# Patient Record
Sex: FEMALE | Race: WHITE | NOT HISPANIC OR LATINO | Employment: OTHER | URBAN - METROPOLITAN AREA
[De-identification: names, ages, dates, MRNs, and addresses within clinical notes are randomized per-mention and may not be internally consistent; named-entity substitution may affect disease eponyms.]

---

## 2017-01-20 ENCOUNTER — GYNECOLOGY VISIT (OUTPATIENT)
Dept: OBGYN | Facility: CLINIC | Age: 32
End: 2017-01-20
Payer: COMMERCIAL

## 2017-01-20 VITALS
SYSTOLIC BLOOD PRESSURE: 118 MMHG | BODY MASS INDEX: 31.55 KG/M2 | DIASTOLIC BLOOD PRESSURE: 72 MMHG | WEIGHT: 213 LBS | HEIGHT: 69 IN

## 2017-01-23 ENCOUNTER — TELEPHONE (OUTPATIENT)
Dept: OBGYN | Facility: CLINIC | Age: 32
End: 2017-01-23

## 2017-01-23 NOTE — TELEPHONE ENCOUNTER
Called pt back   [facility] told [facility] that [facility] - pt talked with them this morning     [facility]ed [facility]; spoke with Lei. Was going to transfer me to the correct dept but then the call was disconnected. Recalled [facility]; spoke with Bea. She verified delivery address; then call was disconnected again. [facility]alled [facility]; spoke with Hayley. Verified delivery address; she informed delivery date is set for 01/27/2017. She verified that it will be delivered on the 27th of Jan. Hayley verified that it will be delivered on 01/27/17.     Called pt - Left message for pt to call back   Informed pt that it should be ehre Friday but htat i would call on Thursday to make sure that the delivery is still on track

## 2017-01-30 NOTE — TELEPHONE ENCOUNTER
Lulú called in regards to another pt; asked Lulú to look into Roxanna's account to see why pt's device was not delivered.   Was transferred to Rajani; informed that its a buy & bill. Going to have them reverify insurance to make sure that device is buy & bill only

## 2017-02-02 NOTE — TELEPHONE ENCOUNTER
[facility]ed [facility] to see if they reverified pt's insurance. Spoke with Kaya; she informed me that the PA was approved (auth #: [id number]) - it will be covered at 100%   Need to schedule - Delivery date scheduled for 02/03/2017

## 2017-02-03 ENCOUNTER — TELEPHONE (OUTPATIENT)
Dept: OBGYN | Facility: CLINIC | Age: 32
End: 2017-02-03

## 2017-02-04 NOTE — TELEPHONE ENCOUNTER
Called the patient and explained that [facility] has been stating to theo that they were shipping the device. Theo stated she would call on Monday and inform the patient of current statu. Pt was grateful for the return call and will be awaiting theo's call.

## 2017-02-06 ENCOUNTER — OFFICE VISIT (OUTPATIENT)
Dept: NEUROLOGY | Facility: MEDICAL CENTER | Age: 32
End: 2017-02-06
Payer: COMMERCIAL

## 2017-02-06 VITALS
BODY MASS INDEX: 32.29 KG/M2 | HEART RATE: 85 BPM | OXYGEN SATURATION: 94 % | WEIGHT: 218 LBS | TEMPERATURE: 99 F | HEIGHT: 69 IN | SYSTOLIC BLOOD PRESSURE: 116 MMHG | RESPIRATION RATE: 16 BRPM | DIASTOLIC BLOOD PRESSURE: 74 MMHG

## 2017-02-06 DIAGNOSIS — H93.8X3 EAR CONGESTION, BILATERAL: ICD-10-CM

## 2017-02-06 DIAGNOSIS — G40.909 SEIZURE DISORDER (HCC): ICD-10-CM

## 2017-02-06 PROCEDURE — 99213 OFFICE O/P EST LOW 20 MIN: CPT | Mod: 25 | Performed by: NURSE PRACTITIONER

## 2017-02-06 PROCEDURE — 64615 CHEMODENERV MUSC MIGRAINE: CPT | Performed by: NURSE PRACTITIONER

## 2017-02-06 RX ORDER — KETOROLAC TROMETHAMINE 30 MG/ML
60 INJECTION, SOLUTION INTRAMUSCULAR; INTRAVENOUS ONCE
Status: COMPLETED | OUTPATIENT
Start: 2017-02-06 | End: 2017-02-06

## 2017-02-06 RX ADMIN — KETOROLAC TROMETHAMINE 60 MG: 30 INJECTION, SOLUTION INTRAMUSCULAR; INTRAVENOUS at 10:41

## 2017-02-06 ASSESSMENT — ENCOUNTER SYMPTOMS
DIZZINESS: 0
NAUSEA: 0
VOMITING: 0
DEPRESSION: 0
NERVOUS/ANXIOUS: 0
SORE THROAT: 0
DOUBLE VISION: 0
ABDOMINAL PAIN: 0
CONSTITUTIONAL NEGATIVE: 1
COUGH: 0
DIARRHEA: 0
HEADACHES: 1
SEIZURES: 0
MUSCULOSKELETAL NEGATIVE: 1

## 2017-02-06 ASSESSMENT — PATIENT HEALTH QUESTIONNAIRE - PHQ9: CLINICAL INTERPRETATION OF PHQ2 SCORE: 2

## 2017-02-06 NOTE — MR AVS SNAPSHOT
"Roxanna Guzmán   2017 10:00 AM   Office Visit   MRN: [id number]    Department:  Neurology [facility]   Dept Phone:  [phone]    Description:  Female : 1985   Provider:  CRIS Jiang           Reason for Visit     Botox Injection Chronic migraine      Allergies as of 2017     No Known Allergies      You were diagnosed with     Chronic migraine   [217251]       Ear congestion, bilateral   [[id number]]         Vital Signs     Blood Pressure Pulse Temperature Respirations Height Weight    116/74 mmHg 85 37.2 °C (99 °F) 16 1.753 m (5' 9.02\") 98.884 kg (218 lb)    Body Mass Index Oxygen Saturation Smoking Status             32.18 kg/m2 94% Never Smoker          Basic Information     Date Of Birth Sex Race Ethnicity Preferred Language    1985 Female White Non- English      Your appointments     May 01, 2017 10:40 AM   BOTOX with CRIS Jiang   [facility] (--)    [address] [place] [address], [address]  [facility] [id number] [phone]              Problem List              ICD-10-CM Priority Class Noted - Resolved    Chronic migraine G43.709   2015 - Present    Seizure disorder (CMS-HCC) G40.909   10/17/2016 - Present      Health Maintenance        Date Due Completion Dates    IMM DTaP/Tdap/Td Vaccine (1 - Tdap) 2003 ---    IMM INFLUENZA (1) 2016 ---    PAP SMEAR 10/17/2019 10/17/2016 (Done), 2015 (Done)    Override on 10/17/2016: Done    Override on 2015: Done (ascus neg hpv)            Current Immunizations     No immunizations on file.      Below and/or attached are the medications your provider expects you to take. Review all of your home medications and newly ordered medications with your provider and/or pharmacist. Follow medication instructions as directed by your provider and/or pharmacist. Please keep your medication list with you and share with your provider. Update the information when medications are discontinued, doses are " changed, or new medications (including over-the-counter products) are added; and carry medication information at all times in the event of emergency situations     Allergies:  No Known Allergies          Medications  Valid as of: February 06, 2017 - 10:43 AM    Generic Name Brand Name Tablet Size Instructions for use    ALPRAZolam (Tab) XANAX 0.5 MG Take 0.5 mg by mouth at bedtime as needed for Sleep.        ARIPiprazole (Tab) ABILIFY 2 MG Take 2 mg by mouth every day.        ClonazePAM (Tab) KLONOPIN 0.5 MG Take 1 Tab by mouth every bedtime.        Escitalopram Oxalate (Tab) LEXAPRO 20 MG Take 20 mg by mouth every day.        Ketorolac Tromethamine (Solution) TORADOL 30 MG/ML 1 mL by Intramuscular route every 6 hours as needed.        Lacosamide (Tab) VIMPAT 200 MG Take 200 mg by mouth 2 Times a Day.        Lacosamide (Tab) VIMPAT 150 MG TAKE 1 TABLET BY MOUTH TWICE A DAY        Norgestim-Eth Estrad Triphasic (Tab) Norgestim-Eth Estrad Triphasic 0.18/0.215/0.25 MG-35 MCG Take 1 Tab by mouth every day.        Rizatriptan Benzoate (TABLET DISPERSIBLE) MAXALT-MLT 10 MG Take 1/2-1 tablet po at onset of headache, may repeat dose in 2 hours if unrelieved.  Do not exceed more than 2 tablets in 24 hours.        Zolpidem Tartrate (Tab) AMBIEN 10 MG Take 10 mg by mouth at bedtime as needed for Sleep.        .                 Medicines prescribed today [facility]     [facility] [id number] [facility] [address]    Phone: [phone] Fax: [phone]    Open 24 Hours?: No      Medication refill instructions:       If your prescription bottle indicates you have medication refills left, it is not necessary to call your provider’s office. Please contact your pharmacy and they will refill your medication.    If your prescription bottle indicates you do not have any refills left, you may request refills at any time through one of the following ways: The online [employer] system  (except Urgent Care), by calling your provider’s office, or by asking your pharmacy to contact your provider’s office with a refill request. Medication refills are processed only during regular business hours and may not be available until the next business day. Your provider may request additional information or to have a follow-up visit with you prior to refilling your medication.   *Please Note: Medication refills are assigned a new Rx number when refilled electronically. Your pharmacy may indicate that no refills were authorized even though a new prescription for the same medication is available at the pharmacy. Please request the medicine by name with the pharmacy before contacting your provider for a refill.        Referral     A referral request has been sent to our patient care coordination department. Please allow 3-5 business days for us to process this request and contact you either by phone or mail. If you do not hear from us by the 5th business day, please call us at [phone].           [employer] Access Code: [email or web address]  Expires: 3/8/2017 10:01 AM    [employer]  A secure, online tool to manage your health information     [employer]® is a secure, online tool that connects you to your personalized health information from the privacy of your home -- day or night - making it very easy for you to manage your healthcare. Once the activation process is completed, you can even access your medical information using the [employer] galen, which is available for free in the Apple Galen store or Google Play store.     [employer] provides the following levels of access (as shown below):   My Chart Features   [facility] Doctor [facility]  Specialists [facility]  Urgent  Care Non-[facility]  Primary Care  Doctor   Email your healthcare team securely and privately 24/7 X X X    Manage appointments: schedule your next appointment; view details of past/upcoming appointments X      Request prescription refills. X       View recent personal medical records, including lab and immunizations X X X X   View health record, including health history, allergies, medications X X X X   Read reports about your outpatient visits, procedures, consult and ER notes X X X X   See your discharge summary, which is a recap of your hospital and/or ER visit that includes your diagnosis, lab results, and care plan. X X       How to register for [employer]:  1. Go to  [email or web address].  2. Click on the Sign Up Now box, which takes you to the New Member Sign Up page. You will need to provide the following information:  a. Enter your [employer] Access Code exactly as it appears at the top of this page. (You will not need to use this code after you’ve completed the sign-up process. If you do not sign up before the expiration date, you must request a new code.)   b. Enter your date of birth.   c. Enter your home email address.   d. Click Submit, and follow the next screen’s instructions.  3. Create a [employer] ID. This will be your [employer] login ID and cannot be changed, so think of one that is secure and easy to remember.  4. Create a [employer] password. You can change your password at any time.  5. Enter your Password Reset Question and Answer. This can be used at a later time if you forget your password.   6. Enter your e-mail address. This allows you to receive e-mail notifications when new information is available in [employer].  7. Click Sign Up. You can now view your health information.    For assistance activating your [employer] account, call [phone]

## 2017-02-06 NOTE — PROGRESS NOTES
Subjective:      Roxanna Guzmán is a 31 y.o. female who presents with Botox Injection.        HPI  Has had a daily headache for the past weeks.  Here for Botox today.    Asking about the need to be seen by the ENT still.  She finds some relief of her ear congestion with Mucinex.    Seizure disorder:  No concerns for seizures with the current dose of Vimpat 150mg BID.      Current Outpatient Prescriptions   Medication Sig Dispense Refill   • VIMPAT 150 MG Tab TAKE 1 TABLET BY MOUTH TWICE A DAY 60 Tab 5   • aripiprazole (ABILIFY) 2 MG tablet Take 2 mg by mouth every day.     • escitalopram (LEXAPRO) 20 MG tablet Take 20 mg by mouth every day.     • Norgestim-Eth Estrad Triphasic (ORTHO TRI-CYCLEN, 28,) 0.18/0.215/0.25 MG-35 MCG Tab Take 1 Tab by mouth every day. 28 Tab 11   • rizatriptan (MAXALT-MLT) 10 MG disintegrating tablet Take 1/2-1 tablet po at onset of headache, may repeat dose in 2 hours if unrelieved.  Do not exceed more than 2 tablets in 24 hours. 12 Tab 5   • alprazolam (XANAX) 0.5 MG Tab Take 0.5 mg by mouth at bedtime as needed for Sleep.     • zolpidem (AMBIEN) 10 MG Tab Take 10 mg by mouth at bedtime as needed for Sleep.     • ketorolac (TORADOL) 30 MG/ML Solution 1 mL by Intramuscular route every 6 hours as needed. 5 mL 1   • lacosamide (VIMPAT) 200 MG Tab tablet Take 200 mg by mouth 2 Times a Day. 60 Tab 2   • clonazepam (KLONOPIN) 0.5 MG TABS Take 1 Tab by mouth every bedtime. (Patient not taking: Reported on 11/14/2016) 60 Tab 1     No current facility-administered medications for this visit.       Review of Systems   Constitutional: Negative.    HENT: Positive for congestion. Negative for ear discharge, hearing loss, nosebleeds and sore throat.         No recent head injury.   Eyes: Negative for double vision.        No new loss of vision.   Respiratory: Negative for cough.         No recent lung infections.   Cardiovascular: Negative for chest pain.   Gastrointestinal: Negative for nausea, vomiting,  "abdominal pain and diarrhea.   Genitourinary: Negative.    Musculoskeletal: Negative.    Skin: Negative.    Neurological: Positive for headaches. Negative for dizziness and seizures.   Endo/Heme/Allergies:        No history of endocrine dysfunction.  No new problems.   Psychiatric/Behavioral: Negative for depression. The patient is not nervous/anxious.         No recent mood changes.          Objective:     /74 mmHg  Pulse 85  Temp(Src) 37.2 °C (99 °F)  Resp 16  Ht 1.753 m (5' 9.02\")  Wt 98.884 kg (218 lb)  BMI 32.18 kg/m2  SpO2 94%     Physical Exam   Constitutional: She is oriented to person, place, and time. She appears well-developed and well-nourished.   HENT:   Head: Normocephalic and atraumatic.   Nose: Nose normal.   Moderately bulging opaque tympanic membranes, cloudy white settled in 1/4 lowest aspect of right membrane.   Eyes: Pupils are equal, round, and reactive to light.   Neck: Normal range of motion.   Cardiovascular: Normal rate and regular rhythm.    Pulmonary/Chest: Effort normal.   Neurological: She is alert and oriented to person, place, and time. She exhibits normal muscle tone. Gait normal.   No observable changes in neurologic status.  See initial new patient examination for details.    Skin: Skin is warm.   Psychiatric: She has a normal mood and affect.          Assessment/Plan:     Chronic Migraine:  Botox therapy has reduced patient’s migraines by more than 7 days and/or 100 hours per month.    Counseled regarding abortive treatment.  Continue Maxalt MLT's-- needs to have a #12 dispensed each month.    I treated this patient in clinic today with BotoxA 155 units according to the dosing/injection paradigm currently mandated by the FDA for the management of chronic migraine. Specifically, I injected 5 units to the procerus, 5 units to the corrugators bilaterally, a total of 20 units to the frontalis musculature, 20 units to the temporalis bilaterally, 15 units to the occipitalis " bilaterally, 10 units to the cervical paraspinals bilaterally and 15 units to the trapezius musculature bilaterally. The remainder of the Botox 200 units mixed but not administered was discarded as wastage per FDA guidelines.    Continue Vimpat 150mg BID.    Toradol 60 mg IM provided per MA.    Will see PCP or ENT regarding ear congestion.    Return for follow-up in 3 months for 4th set of Botox

## 2017-02-06 NOTE — TELEPHONE ENCOUNTER
Kayy - from [facility] called - informed me taht its buy & bill only. Informed her that we got an authorization from pt's insurance. The rep will be calling me back alter today   Called pt with a status update - Left message for pt to call back

## 2017-02-07 NOTE — TELEPHONE ENCOUNTER
NEXPLANON HAS BEEN DELIVERED     Need to schedule pt for insertion  Left message for pt to call back

## 2017-03-10 ENCOUNTER — GYNECOLOGY VISIT (OUTPATIENT)
Dept: OBGYN | Facility: CLINIC | Age: 32
End: 2017-03-10
Payer: COMMERCIAL

## 2017-03-10 VITALS — DIASTOLIC BLOOD PRESSURE: 80 MMHG | SYSTOLIC BLOOD PRESSURE: 120 MMHG

## 2017-03-10 DIAGNOSIS — Z30.017 NEXPLANON INSERTION: ICD-10-CM

## 2017-03-10 DIAGNOSIS — Z30.9 ENCOUNTER FOR CONTRACEPTIVE MANAGEMENT, UNSPECIFIED CONTRACEPTIVE ENCOUNTER TYPE: ICD-10-CM

## 2017-03-10 LAB
INT CON NEG: NEGATIVE
INT CON POS: POSITIVE
POC URINE PREGNANCY TEST: NEGATIVE

## 2017-03-10 PROCEDURE — 81025 URINE PREGNANCY TEST: CPT | Performed by: OBSTETRICS & GYNECOLOGY

## 2017-03-10 PROCEDURE — 11981 INSERTION DRUG DLVR IMPLANT: CPT | Performed by: OBSTETRICS & GYNECOLOGY

## 2017-03-11 NOTE — PROGRESS NOTES
31-year-old female who is here today for insertion of Nexplanon. Patient had this device before and did well. Currently without complaints    Discussed with patient indications for Nexplanon  Also discussed risks associated with placement procedure and device itself  Specifically discussed were infection at insertion site, device becoming dislocated from the insertion site. Also discussed were the use of device, we discussed device is good for 3 years. Discussed possible menstrual cycle irregularities associated with the device which can include amenorrhea, normal menstrual cycles or menorrhagia. Also discussed device failure of approximately 1%.  Previously patient has reviewed 's patient information handout  Informed consent is then signed  Urine pregnancy test is negative  The *left:arm is evaluated. The medial epicondyle is palpated and a marked is placed 8-10 cm from the medial epicondyle. A guiding chinyere is then placed approximately 5 cm from the insertion point. The area is then cleansed with Betadine swabs x3. 1% lidocaine with epinephrine is then placed into the insertion site as well as the insertion tract.  The Nexplanon device is then inspected, and the cap removed.  The device needle was inserted into the skin and the needle is pushed subcutaneously toward the guiding chinyere until the needle we just the hub.  At that point the device insertion trigger is pulled and the device is inserted.  Nexplanon is then palpated under the skin.  Pressure is applied to the area of insertion and Steri-Strips placed. The area is covered with gauze, the gauze is then wrapped with Coban.  The patient is instructed not to remove for 24 hours  Patient is also instructed to followup in one to 2 weeks to check device placement

## 2017-05-01 ENCOUNTER — OFFICE VISIT (OUTPATIENT)
Dept: NEUROLOGY | Facility: MEDICAL CENTER | Age: 32
End: 2017-05-01
Payer: COMMERCIAL

## 2017-05-01 VITALS
SYSTOLIC BLOOD PRESSURE: 114 MMHG | DIASTOLIC BLOOD PRESSURE: 72 MMHG | WEIGHT: 202 LBS | BODY MASS INDEX: 29.92 KG/M2 | TEMPERATURE: 98.2 F | RESPIRATION RATE: 16 BRPM | HEART RATE: 69 BPM | OXYGEN SATURATION: 99 % | HEIGHT: 69 IN

## 2017-05-01 DIAGNOSIS — G40.909 SEIZURE DISORDER (HCC): ICD-10-CM

## 2017-05-01 PROCEDURE — 64615 CHEMODENERV MUSC MIGRAINE: CPT | Performed by: NURSE PRACTITIONER

## 2017-05-01 RX ORDER — KETOROLAC TROMETHAMINE 30 MG/ML
60 INJECTION, SOLUTION INTRAMUSCULAR; INTRAVENOUS ONCE
Status: COMPLETED | OUTPATIENT
Start: 2017-05-01 | End: 2017-05-01

## 2017-05-01 RX ADMIN — KETOROLAC TROMETHAMINE 60 MG: 30 INJECTION, SOLUTION INTRAMUSCULAR; INTRAVENOUS at 11:27

## 2017-05-01 ASSESSMENT — ENCOUNTER SYMPTOMS
NERVOUS/ANXIOUS: 0
NAUSEA: 0
SEIZURES: 0
DOUBLE VISION: 0
DIZZINESS: 0
VOMITING: 0
CONSTITUTIONAL NEGATIVE: 1
ABDOMINAL PAIN: 0
DIARRHEA: 0
DEPRESSION: 0
MUSCULOSKELETAL NEGATIVE: 1
SORE THROAT: 0
HEADACHES: 1
COUGH: 0

## 2017-05-01 NOTE — PROGRESS NOTES
Subjective:      Roxanna Guzmán is a 31 y.o. female who presents with Botox Injection            HPI Here for Botox today.  Still has migraines but they are effectively reduced with abortive treatment.      Current Outpatient Prescriptions   Medication Sig Dispense Refill   • rizatriptan (MAXALT-MLT) 10 MG disintegrating tablet Take 1/2-1 tablet po at onset of headache, may repeat dose in 2 hours if unrelieved.  Do not exceed more than 2 tablets in 24 hours. 12 Tab 5   • aripiprazole (ABILIFY) 2 MG tablet Take 2 mg by mouth every day.     • escitalopram (LEXAPRO) 20 MG tablet Take 20 mg by mouth every day.     • alprazolam (XANAX) 0.5 MG Tab Take 0.5 mg by mouth at bedtime as needed for Sleep.     • zolpidem (AMBIEN) 10 MG Tab Take 10 mg by mouth at bedtime as needed for Sleep.     • lacosamide (VIMPAT) 200 MG Tab tablet Take 200 mg by mouth 2 Times a Day. 60 Tab 2   • VIMPAT 150 MG Tab TAKE 1 TABLET BY MOUTH TWICE A DAY 60 Tab 5   • ketorolac (TORADOL) 30 MG/ML Solution 1 mL by Intramuscular route every 6 hours as needed. 5 mL 1   • Norgestim-Eth Estrad Triphasic (ORTHO TRI-CYCLEN, 28,) 0.18/0.215/0.25 MG-35 MCG Tab Take 1 Tab by mouth every day. 28 Tab 11   • clonazepam (KLONOPIN) 0.5 MG TABS Take 1 Tab by mouth every bedtime. 60 Tab 1     No current facility-administered medications for this visit.       Review of Systems   Constitutional: Negative.    HENT: Negative for hearing loss, nosebleeds and sore throat.         No recent head injury.   Eyes: Negative for double vision.        No new loss of vision.   Respiratory: Negative for cough.         No recent lung infections.   Cardiovascular: Negative for chest pain.   Gastrointestinal: Negative for nausea, vomiting, abdominal pain and diarrhea.   Genitourinary: Negative.    Musculoskeletal: Negative.    Skin: Negative.    Neurological: Positive for headaches. Negative for dizziness and seizures.   Endo/Heme/Allergies:        No history of endocrine dysfunction.  No  "new problems.   Psychiatric/Behavioral: Negative for depression. The patient is not nervous/anxious.         No recent mood changes.          Objective:     /72 mmHg  Pulse 69  Temp(Src) 36.8 °C (98.2 °F)  Resp 16  Ht 1.753 m (5' 9\")  Wt 91.627 kg (202 lb)  BMI 29.82 kg/m2  SpO2 99%     Physical Exam   Constitutional: She is oriented to person, place, and time. She appears well-developed and well-nourished. No distress.   HENT:   Head: Normocephalic and atraumatic.   Nose: Nose normal.   Eyes: EOM are normal.   Neck: Normal range of motion.   Cardiovascular: Normal rate and regular rhythm.    Pulmonary/Chest: Effort normal.   Neurological: She is alert and oriented to person, place, and time. She exhibits normal muscle tone. Gait normal.   No observable changes in neurologic status.  See initial new patient examination for details.    Skin: Skin is warm.   Psychiatric: She has a normal mood and affect.               Assessment/Plan:     Chronic Migraine:  Botox therapy has reduced patient’s migraines by more than 7 days and/or 100 hours per month.    I treated this patient in clinic today with BotoxA 155 units according to the dosing/injection paradigm currently mandated by the FDA for the management of chronic migraine. Specifically, I injected 5 units to the procerus, 5 units to the corrugators bilaterally, a total of 20 units to the frontalis musculature, 20 units to the temporalis bilaterally, 15 units to the occipitalis bilaterally, 10 units to the cervical paraspinals bilaterally and 15 units to the trapezius musculature bilaterally. The remainder of the Botox 200 units mixed but not administered was discarded as wastage per FDA guidelines.    Will continue Vimpat 300mg BID.    Has triptans for abortive treatment.    Return for follow-up in 3 months for serial set of Botox.        "

## 2017-05-01 NOTE — MR AVS SNAPSHOT
"Roxanna Guzmán   2017 10:40 AM   Office Visit   MRN: [id number]    Department:  [facility]   Dept Phone:  [phone]    Description:  Female : 1985   Provider:  CRIS Jiang           Reason for Visit     Botox Injection Migraine      Allergies as of 2017     No Known Allergies      You were diagnosed with     Chronic migraine   [694069]         Vital Signs     Blood Pressure Pulse Temperature Respirations Height Weight    114/72 mmHg 69 36.8 °C (98.2 °F) 16 1.753 m (5' 9\") 91.627 kg (202 lb)    Body Mass Index Oxygen Saturation Smoking Status             29.82 kg/m2 99% Never Smoker          Basic Information     Date Of Birth Sex Race Ethnicity Preferred Language    1985 Female White Non- English      Your appointments     Jul 10, 2017  9:20 AM   BOTOX with CRIS Jiang   [facility] (--)    [address], [address]  [facility] [id number] [phone]              Problem List              ICD-10-CM Priority Class Noted - Resolved    Chronic migraine G43.709   2015 - Present    Seizure disorder (CMS-HCC) G40.909   10/17/2016 - Present      Health Maintenance        Date Due Completion Dates    IMM DTaP/Tdap/Td Vaccine (1 - Tdap) 2003 ---    PAP SMEAR 10/17/2019 10/17/2016 (Done), 2015 (Done)    Override on 10/17/2016: Done    Override on 2015: Done (ascus neg hpv)            Current Immunizations     No immunizations on file.      Below and/or attached are the medications your provider expects you to take. Review all of your home medications and newly ordered medications with your provider and/or pharmacist. Follow medication instructions as directed by your provider and/or pharmacist. Please keep your medication list with you and share with your provider. Update the information when medications are discontinued, doses are changed, or new medications (including over-the-counter products) are added; and carry " medication information at all times in the event of emergency situations     Allergies:  No Known Allergies          Medications  Valid as of: May 01, 2017 - 11:18 AM    Generic Name Brand Name Tablet Size Instructions for use    ALPRAZolam (Tab) XANAX 0.5 MG Take 0.5 mg by mouth at bedtime as needed for Sleep.        ARIPiprazole (Tab) ABILIFY 2 MG Take 2 mg by mouth every day.        ClonazePAM (Tab) KLONOPIN 0.5 MG Take 1 Tab by mouth every bedtime.        Escitalopram Oxalate (Tab) LEXAPRO 20 MG Take 20 mg by mouth every day.        Ketorolac Tromethamine (Solution) TORADOL 30 MG/ML 1 mL by Intramuscular route every 6 hours as needed.        Lacosamide (Tab) VIMPAT 200 MG Take 200 mg by mouth 2 Times a Day.        Lacosamide (Tab) VIMPAT 150 MG TAKE 1 TABLET BY MOUTH TWICE A DAY        Norgestim-Eth Estrad Triphasic (Tab) Norgestim-Eth Estrad Triphasic 0.18/0.215/0.25 MG-35 MCG Take 1 Tab by mouth every day.        Rizatriptan Benzoate (TABLET DISPERSIBLE) MAXALT-MLT 10 MG Take 1/2-1 tablet po at onset of headache, may repeat dose in 2 hours if unrelieved.  Do not exceed more than 2 tablets in 24 hours.        Zolpidem Tartrate (Tab) AMBIEN 10 MG Take 10 mg by mouth at bedtime as needed for Sleep.        .                 Medicines prescribed today were sent to:     [address] [facility]    [address] [facility] [address]    Phone: [phone] Fax: [phone]    Open 24 Hours?: No      Medication refill instructions:       If your prescription bottle indicates you have medication refills left, it is not necessary to call your provider’s office. Please contact your pharmacy and they will refill your medication.    If your prescription bottle indicates you do not have any refills left, you may request refills at any time through one of the following ways: The online [employer] system (except Urgent Care), by calling your provider’s office, or by asking your pharmacy to  contact your provider’s office with a refill request. Medication refills are processed only during regular business hours and may not be available until the next business day. Your provider may request additional information or to have a follow-up visit with you prior to refilling your medication.   *Please Note: Medication refills are assigned a new Rx number when refilled electronically. Your pharmacy may indicate that no refills were authorized even though a new prescription for the same medication is available at the pharmacy. Please request the medicine by name with the pharmacy before contacting your provider for a refill.           [employer] Access Code: [email or web address]  Expires: 5/8/2017 11:25 AM    [employer]  A secure, online tool to manage your health information     [employer]’s [employer]® is a secure, online tool that connects you to your personalized health information from the privacy of your home -- day or night - making it very easy for you to manage your healthcare. Once the activation process is completed, you can even access your medical information using the [employer] galen, which is available for free in the Apple Galen store or Google Play store.     [employer] provides the following levels of access (as shown below):   My Chart Features   [facility] Doctor [facility]  Specialists [facility]  Urgent  Care Non-[facility]  Primary Care  Doctor   Email your healthcare team securely and privately 24/7 X X X    Manage appointments: schedule your next appointment; view details of past/upcoming appointments X      Request prescription refills. X      View recent personal medical records, including lab and immunizations X X X X   View health record, including health history, allergies, medications X X X X   Read reports about your outpatient visits, procedures, consult and ER notes X X X X   See your discharge summary, which is a recap of your hospital and/or ER visit that includes your diagnosis, lab results, and  care plan. X X       How to register for [employer]:  1. Go to  [email or web address].  2. Click on the Sign Up Now box, which takes you to the New Member Sign Up page. You will need to provide the following information:  a. Enter your [employer] Access Code exactly as it appears at the top of this page. (You will not need to use this code after you’ve completed the sign-up process. If you do not sign up before the expiration date, you must request a new code.)   b. Enter your date of birth.   c. Enter your home email address.   d. Click Submit, and follow the next screen’s instructions.  3. Create a [employer] ID. This will be your [employer] login ID and cannot be changed, so think of one that is secure and easy to remember.  4. Create a [employer] password. You can change your password at any time.  5. Enter your Password Reset Question and Answer. This can be used at a later time if you forget your password.   6. Enter your e-mail address. This allows you to receive e-mail notifications when new information is available in [employer].  7. Click Sign Up. You can now view your health information.    For assistance activating your [employer] account, call [phone]

## 2017-05-22 ENCOUNTER — HOSPITAL ENCOUNTER (OUTPATIENT)
Dept: RADIOLOGY | Facility: MEDICAL CENTER | Age: 32
End: 2017-05-22
Attending: ORAL & MAXILLOFACIAL SURGERY
Payer: COMMERCIAL

## 2017-05-22 DIAGNOSIS — M26.621 ARTHRALGIA OF RIGHT TEMPOROMANDIBULAR JOINT: ICD-10-CM

## 2017-05-22 DIAGNOSIS — M26.639 ARTICULAR DISC DISORDER (REDUCING OR NON-REDUCING) OF TEMPOROMANDIBULAR JOINT: ICD-10-CM

## 2017-05-22 DIAGNOSIS — R68.84 JAW PAIN: ICD-10-CM

## 2017-05-22 PROCEDURE — 70336 MAGNETIC IMAGE JAW JOINT: CPT

## 2017-07-24 ENCOUNTER — OFFICE VISIT (OUTPATIENT)
Dept: NEUROLOGY | Facility: MEDICAL CENTER | Age: 32
End: 2017-07-24
Payer: COMMERCIAL

## 2017-07-24 VITALS
WEIGHT: 200.2 LBS | OXYGEN SATURATION: 96 % | RESPIRATION RATE: 16 BRPM | HEART RATE: 72 BPM | SYSTOLIC BLOOD PRESSURE: 116 MMHG | TEMPERATURE: 98.2 F | BODY MASS INDEX: 29.65 KG/M2 | DIASTOLIC BLOOD PRESSURE: 72 MMHG | HEIGHT: 69 IN

## 2017-07-24 DIAGNOSIS — G40.909 SEIZURE DISORDER (HCC): ICD-10-CM

## 2017-07-24 PROCEDURE — 64615 CHEMODENERV MUSC MIGRAINE: CPT | Performed by: NURSE PRACTITIONER

## 2017-07-24 RX ORDER — KETOROLAC TROMETHAMINE 30 MG/ML
60 INJECTION, SOLUTION INTRAMUSCULAR; INTRAVENOUS ONCE
Status: COMPLETED | OUTPATIENT
Start: 2017-07-24 | End: 2017-07-24

## 2017-07-24 RX ADMIN — KETOROLAC TROMETHAMINE 60 MG: 30 INJECTION, SOLUTION INTRAMUSCULAR; INTRAVENOUS at 13:49

## 2017-07-24 ASSESSMENT — PAIN SCALES - GENERAL: PAINLEVEL: 5=MODERATE PAIN

## 2017-07-24 NOTE — PROGRESS NOTES
"Subjective:      Roxanna Guzmán is a 31 y.o. female who presents with Botox Injection          HPI Here for Botox today.    Generally doing well.    Current Outpatient Prescriptions   Medication Sig Dispense Refill   • etonogestrel (NEXPLANON) 68 MG Implant implant Inject 1 Each as instructed Once.     • rizatriptan (MAXALT-MLT) 10 MG disintegrating tablet Take 1/2-1 tablet po at onset of headache, may repeat dose in 2 hours if unrelieved.  Do not exceed more than 2 tablets in 24 hours. 12 Tab 5   • aripiprazole (ABILIFY) 2 MG tablet Take 2 mg by mouth every day.     • escitalopram (LEXAPRO) 20 MG tablet Take 20 mg by mouth every day.     • alprazolam (XANAX) 0.5 MG Tab Take 0.5 mg by mouth at bedtime as needed for Sleep.     • zolpidem (AMBIEN) 10 MG Tab Take 10 mg by mouth at bedtime as needed for Sleep.     • lacosamide (VIMPAT) 200 MG Tab tablet Take 200 mg by mouth 2 Times a Day. 60 Tab 2   • VIMPAT 150 MG Tab TAKE ONE TABLET BY MOUTH TWICE DAILY (Patient not taking: Reported on 7/24/2017) 60 Tab 5   • ketorolac (TORADOL) 30 MG/ML Solution 1 mL by Intramuscular route every 6 hours as needed. (Patient not taking: Reported on 7/24/2017) 5 mL 1   • Norgestim-Eth Estrad Triphasic (ORTHO TRI-CYCLEN, 28,) 0.18/0.215/0.25 MG-35 MCG Tab Take 1 Tab by mouth every day. (Patient not taking: Reported on 7/24/2017) 28 Tab 11   • clonazepam (KLONOPIN) 0.5 MG TABS Take 1 Tab by mouth every bedtime. (Patient not taking: Reported on 7/24/2017) 60 Tab 1     No current facility-administered medications for this visit.       ROS       Objective:     /72 mmHg  Pulse 72  Temp(Src) 36.8 °C (98.2 °F)  Resp 16  Ht 1.753 m (5' 9.02\")  Wt 90.81 kg (200 lb 3.2 oz)  BMI 29.55 kg/m2  SpO2 96%     Physical Exam            Assessment/Plan:     Chronic Migraine:  Botox therapy has reduced patient’s migraines by more than 7 days and/or 100 hours per month.    I treated this patient in clinic today with BotoxA 155 units according to the " dosing/injection paradigm currently mandated by the FDA for the management of chronic migraine. Specifically, I injected 5 units to the procerus, 5 units to the corrugators bilaterally, a total of 20 units to the frontalis musculature, 20 units to the temporalis bilaterally, 15 units to the occipitalis bilaterally, 10 units to the cervical paraspinals bilaterally and 15 units to the trapezius musculature bilaterally. The remainder of the Botox 200 units mixed but not administered was discarded as wastage per FDA guidelines.    Toradol 60mg IM provided per MA.    Return for follow-up in 3 months for serial set of Botox.

## 2017-07-24 NOTE — MR AVS SNAPSHOT
"Roxanna Guzmán   2017 10:40 AM   Office Visit   MRN: [id number]    Department:  [facility]   Dept Phone:  [phone]    Description:  Female : 1985   Provider:  CRIS Jiang           Reason for Visit     Botox Injection Chronic migraine      Allergies as of 2017     No Known Allergies      You were diagnosed with     Chronic migraine   [443735]       Seizure disorder (CMS-HCC)   [691277]         Vital Signs     Blood Pressure Pulse Temperature Respirations Height Weight    116/72 mmHg 72 36.8 °C (98.2 °F) 16 1.753 m (5' 9.02\") 90.81 kg (200 lb 3.2 oz)    Body Mass Index Oxygen Saturation Smoking Status             29.55 kg/m2 96% Never Smoker          Basic Information     Date Of Birth Sex Race Ethnicity Preferred Language    1985 Female White Non- English      Problem List              ICD-10-CM Priority Class Noted - Resolved    Chronic migraine G43.709   2015 - Present    Seizure disorder (CMS-HCC) G40.909   10/17/2016 - Present      Health Maintenance        Date Due Completion Dates    IMM DTaP/Tdap/Td Vaccine (1 - Tdap) 2003 ---    IMM INFLUENZA (1) 2017 ---    PAP SMEAR 10/17/2019 10/17/2016 (Done), 2015 (Done)    Override on 10/17/2016: Done    Override on 2015: Done (ascus neg hpv)            Current Immunizations     No immunizations on file.      Below and/or attached are the medications your provider expects you to take. Review all of your home medications and newly ordered medications with your provider and/or pharmacist. Follow medication instructions as directed by your provider and/or pharmacist. Please keep your medication list with you and share with your provider. Update the information when medications are discontinued, doses are changed, or new medications (including over-the-counter products) are added; and carry medication information at all times in the event of emergency situations     Allergies:  No Known " Allergies          Medications  Valid as of: July 24, 2017 - 11:16 AM    Generic Name Brand Name Tablet Size Instructions for use    ALPRAZolam (Tab) XANAX 0.5 MG Take 0.5 mg by mouth at bedtime as needed for Sleep.        ARIPiprazole (Tab) ABILIFY 2 MG Take 2 mg by mouth every day.        ClonazePAM (Tab) KLONOPIN 0.5 MG Take 1 Tab by mouth every bedtime.        Escitalopram Oxalate (Tab) LEXAPRO 20 MG Take 20 mg by mouth every day.        Etonogestrel (Implant) NEXPLANON 68 MG Inject 1 Each as instructed Once.        Ketorolac Tromethamine (Solution) TORADOL 30 MG/ML 1 mL by Intramuscular route every 6 hours as needed.        Lacosamide (Tab) VIMPAT 200 MG Take 200 mg by mouth 2 Times a Day.        Lacosamide (Tab) VIMPAT 150 MG TAKE ONE TABLET BY MOUTH TWICE DAILY        Norgestim-Eth Estrad Triphasic (Tab) Norgestim-Eth Estrad Triphasic 0.18/0.215/0.25 MG-35 MCG Take 1 Tab by mouth every day.        Rizatriptan Benzoate (TABLET DISPERSIBLE) MAXALT-MLT 10 MG Take 1/2-1 tablet po at onset of headache, may repeat dose in 2 hours if unrelieved.  Do not exceed more than 2 tablets in 24 hours.        Zolpidem Tartrate (Tab) AMBIEN 10 MG Take 10 mg by mouth at bedtime as needed for Sleep.        .                 Medicines prescribed today were sent to:     [address] [facility]    [address] [facility] [place] [place] [address]    Phone: [phone] Fax: [phone]    Open 24 Hours?: No      Medication refill instructions:       If your prescription bottle indicates you have medication refills left, it is not necessary to call your provider’s office. Please contact your pharmacy and they will refill your medication.    If your prescription bottle indicates you do not have any refills left, you may request refills at any time through one of the following ways: The online [employer] system (except Urgent Care), by calling your provider’s office, or by asking your pharmacy to contact your  provider’s office with a refill request. Medication refills are processed only during regular business hours and may not be available until the next business day. Your provider may request additional information or to have a follow-up visit with you prior to refilling your medication.   *Please Note: Medication refills are assigned a new Rx number when refilled electronically. Your pharmacy may indicate that no refills were authorized even though a new prescription for the same medication is available at the pharmacy. Please request the medicine by name with the pharmacy before contacting your provider for a refill.           [employer] Access Code: Activation code not generated  Current [employer] Status: Active

## 2017-10-20 ENCOUNTER — APPOINTMENT (OUTPATIENT)
Dept: NEUROLOGY | Facility: MEDICAL CENTER | Age: 32
End: 2017-10-20
Payer: COMMERCIAL

## 2017-10-23 ENCOUNTER — OFFICE VISIT (OUTPATIENT)
Dept: NEUROLOGY | Facility: MEDICAL CENTER | Age: 32
End: 2017-10-23
Payer: COMMERCIAL

## 2017-10-23 VITALS
DIASTOLIC BLOOD PRESSURE: 76 MMHG | WEIGHT: 198.5 LBS | OXYGEN SATURATION: 97 % | TEMPERATURE: 98.4 F | HEART RATE: 82 BPM | HEIGHT: 69 IN | BODY MASS INDEX: 29.4 KG/M2 | SYSTOLIC BLOOD PRESSURE: 120 MMHG

## 2017-10-23 PROCEDURE — 64615 CHEMODENERV MUSC MIGRAINE: CPT | Performed by: NURSE PRACTITIONER

## 2017-10-23 NOTE — PROGRESS NOTES
"Subjective:      Roxanna Guzmán is a 32 y.o. female who presents with Botox Injection (chronic migraine)            HPI Here for Botox today.    Current Outpatient Prescriptions   Medication Sig Dispense Refill   • etonogestrel (NEXPLANON) 68 MG Implant implant Inject 1 Each as instructed Once.     • VIMPAT 150 MG Tab TAKE ONE TABLET BY MOUTH TWICE DAILY 60 Tab 5   • aripiprazole (ABILIFY) 2 MG tablet Take 2 mg by mouth every day.     • escitalopram (LEXAPRO) 20 MG tablet Take 20 mg by mouth every day.     • alprazolam (XANAX) 0.5 MG Tab Take 0.5 mg by mouth at bedtime as needed for Sleep.     • rizatriptan (MAXALT-MLT) 10 MG disintegrating tablet TAKE 1/2 TO 1 TAB BY MOUTH AT ONSET OF HEADACHE MAY REPEAT DOSE IN 2 HOURS IF UNRELIVED MAX 2 12 Tab 5   • zolpidem (AMBIEN) 10 MG Tab Take 10 mg by mouth at bedtime as needed for Sleep.     • ketorolac (TORADOL) 30 MG/ML Solution 1 mL by Intramuscular route every 6 hours as needed. (Patient not taking: Reported on 7/24/2017) 5 mL 1   • Norgestim-Eth Estrad Triphasic (ORTHO TRI-CYCLEN, 28,) 0.18/0.215/0.25 MG-35 MCG Tab Take 1 Tab by mouth every day. (Patient not taking: Reported on 7/24/2017) 28 Tab 11   • lacosamide (VIMPAT) 200 MG Tab tablet Take 200 mg by mouth 2 Times a Day. (Patient not taking: Reported on 10/23/2017) 60 Tab 2   • clonazepam (KLONOPIN) 0.5 MG TABS Take 1 Tab by mouth every bedtime. (Patient not taking: Reported on 10/23/2017) 60 Tab 1     No current facility-administered medications for this visit.        ROS       Objective:     /76   Pulse 82   Temp 36.9 °C (98.4 °F)   Ht 1.753 m (5' 9.02\")   Wt 90 kg (198 lb 8 oz)   SpO2 97%   BMI 29.30 kg/m²      Physical Exam            Assessment/Plan:     Chronic Migraine:  Botox therapy has reduced patient’s migraines by more than 7 days and/or 100 hours per month.    I treated this patient in clinic today with BotoxA 155 units according to the dosing/injection paradigm currently mandated by the FDA " for the management of chronic migraine. Specifically, I injected 5 units to the procerus, 5 units to the corrugators bilaterally, a total of 20 units to the frontalis musculature, 20 units to the temporalis bilaterally, 15 units to the occipitalis bilaterally, 10 units to the cervical paraspinals bilaterally and 15 units to the trapezius musculature bilaterally. The remainder of the Botox 200 units mixed but not administered was discarded as wastage per FDA guidelines.    Return for follow-up in 3 months for serial set of Botox.

## 2018-01-09 DIAGNOSIS — G43.709 CHRONIC MIGRAINE W/O AURA W/O STATUS MIGRAINOSUS, NOT INTRACTABLE: ICD-10-CM

## 2018-03-26 ENCOUNTER — OFFICE VISIT (OUTPATIENT)
Dept: NEUROLOGY | Facility: MEDICAL CENTER | Age: 33
End: 2018-03-26
Payer: COMMERCIAL

## 2018-03-26 VITALS
HEART RATE: 76 BPM | TEMPERATURE: 98.1 F | HEIGHT: 69 IN | DIASTOLIC BLOOD PRESSURE: 60 MMHG | BODY MASS INDEX: 29.16 KG/M2 | WEIGHT: 196.87 LBS | SYSTOLIC BLOOD PRESSURE: 130 MMHG

## 2018-03-26 DIAGNOSIS — G43.709 CHRONIC MIGRAINE W/O AURA W/O STATUS MIGRAINOSUS, NOT INTRACTABLE: ICD-10-CM

## 2018-03-26 PROCEDURE — 64615 CHEMODENERV MUSC MIGRAINE: CPT | Performed by: PHYSICIAN ASSISTANT

## 2018-03-26 RX ORDER — KETOROLAC TROMETHAMINE 30 MG/ML
30 INJECTION, SOLUTION INTRAMUSCULAR; INTRAVENOUS ONCE
Status: COMPLETED | OUTPATIENT
Start: 2018-03-26 | End: 2018-03-26

## 2018-03-26 RX ADMIN — KETOROLAC TROMETHAMINE 30 MG: 30 INJECTION, SOLUTION INTRAMUSCULAR; INTRAVENOUS at 16:24

## 2018-03-26 NOTE — PROGRESS NOTES
MA administered toradol    I treated this patient in clinic today with BotoxA 195 units according to the dosing/injection paradigm currently mandated by the FDA for the management of chronic migraine. Specifically, I injected 5 units to the procerus, 5 units to the corrugators bilaterally, a total of 20 units to the frontalis musculature, 30 units to the temporalis bilaterally, 15 units to the occipitalis bilaterally, 10 units to the cervical paraspinals bilaterally and 15 units to the trapezius musculature bilaterally.    Pt was advised of side effects associated with medication.    I also injected an additional 10 units into the occipital belly of the occipitofrontalis each side because pt complained of reduced efficacy or effect wearing off prior to next dose.    The remainder of the Botox 200 units mixed but not administered was discarded as wastage per FDA guidelines.    I raised dose to 195 units because even at best her migraine frequency was still at least once per week.  Much better than it had been so botox is working but let's see if we can increase effectiveness with max dose.

## 2018-06-18 ENCOUNTER — OFFICE VISIT (OUTPATIENT)
Dept: NEUROLOGY | Facility: MEDICAL CENTER | Age: 33
End: 2018-06-18
Payer: COMMERCIAL

## 2018-06-18 VITALS
BODY MASS INDEX: 28.07 KG/M2 | TEMPERATURE: 97.9 F | SYSTOLIC BLOOD PRESSURE: 118 MMHG | OXYGEN SATURATION: 98 % | HEART RATE: 81 BPM | HEIGHT: 69 IN | DIASTOLIC BLOOD PRESSURE: 76 MMHG | RESPIRATION RATE: 14 BRPM | WEIGHT: 189.49 LBS

## 2018-06-18 PROCEDURE — 64615 CHEMODENERV MUSC MIGRAINE: CPT | Performed by: PHYSICIAN ASSISTANT

## 2018-06-18 RX ORDER — KETOROLAC TROMETHAMINE 30 MG/ML
60 INJECTION, SOLUTION INTRAMUSCULAR; INTRAVENOUS ONCE
Status: COMPLETED | OUTPATIENT
Start: 2018-06-18 | End: 2018-06-18

## 2018-06-18 RX ADMIN — KETOROLAC TROMETHAMINE 60 MG: 30 INJECTION, SOLUTION INTRAMUSCULAR; INTRAVENOUS at 17:09

## 2018-06-18 ASSESSMENT — PAIN SCALES - GENERAL: PAINLEVEL: NO PAIN

## 2018-06-18 ASSESSMENT — PATIENT HEALTH QUESTIONNAIRE - PHQ9: CLINICAL INTERPRETATION OF PHQ2 SCORE: 0

## 2018-06-19 NOTE — PROGRESS NOTES
MA administered toradol    I treated this patient in clinic today with BotoxA 195 units according to the dosing/injection paradigm currently mandated by the FDA for the management of chronic migraine. Specifically, I injected 5 units to the procerus, 5 units to the corrugators bilaterally, a total of 20 units to the frontalis musculature, 30 units to the temporalis bilaterally, 15 units to the occipitalis bilaterally, 10 units to the cervical paraspinals bilaterally and 15 units to the trapezius musculature bilaterally.    Pt was advised of side effects associated with medication.    I also injected an additional 10 units into the occipital belly of the occipitofrontalis each side because pt complained of reduced efficacy or effect wearing off prior to next dose.    The remainder of the Botox 200 units mixed but not administered was discarded as wastage per FDA guidelines.    This higher dose is being used because pt c/o wearing off prior to administration of next botox when she received a lower dose.

## 2018-07-09 ENCOUNTER — HOSPITAL ENCOUNTER (OUTPATIENT)
Dept: LAB | Facility: MEDICAL CENTER | Age: 33
End: 2018-07-09
Attending: INTERNAL MEDICINE
Payer: COMMERCIAL

## 2018-07-09 PROCEDURE — 88175 CYTOPATH C/V AUTO FLUID REDO: CPT

## 2018-07-09 PROCEDURE — 87624 HPV HI-RISK TYP POOLED RSLT: CPT

## 2018-07-11 LAB
CYTOLOGY REG CYTOL: NORMAL
HPV HR 12 DNA CVX QL NAA+PROBE: NEGATIVE
HPV16 DNA SPEC QL NAA+PROBE: NEGATIVE
HPV18 DNA SPEC QL NAA+PROBE: NEGATIVE
SPECIMEN SOURCE: NORMAL

## 2018-07-14 ENCOUNTER — HOSPITAL ENCOUNTER (OUTPATIENT)
Dept: LAB | Facility: MEDICAL CENTER | Age: 33
End: 2018-07-14
Attending: FAMILY MEDICINE
Payer: COMMERCIAL

## 2018-07-14 LAB
25(OH)D3 SERPL-MCNC: 30 NG/ML (ref 30–100)
ALBUMIN SERPL BCP-MCNC: 4.2 G/DL (ref 3.2–4.9)
ALBUMIN/GLOB SERPL: 1.8 G/DL
ALP SERPL-CCNC: 59 U/L (ref 30–99)
ALT SERPL-CCNC: 26 U/L (ref 2–50)
ANION GAP SERPL CALC-SCNC: 6 MMOL/L (ref 0–11.9)
AST SERPL-CCNC: 15 U/L (ref 12–45)
BASOPHILS # BLD AUTO: 1.5 % (ref 0–1.8)
BASOPHILS # BLD: 0.09 K/UL (ref 0–0.12)
BILIRUB SERPL-MCNC: 0.7 MG/DL (ref 0.1–1.5)
BUN SERPL-MCNC: 15 MG/DL (ref 8–22)
CALCIUM SERPL-MCNC: 9.3 MG/DL (ref 8.5–10.5)
CHLORIDE SERPL-SCNC: 106 MMOL/L (ref 96–112)
CHOLEST SERPL-MCNC: 163 MG/DL (ref 100–199)
CO2 SERPL-SCNC: 29 MMOL/L (ref 20–33)
CREAT SERPL-MCNC: 0.93 MG/DL (ref 0.5–1.4)
EOSINOPHIL # BLD AUTO: 0.23 K/UL (ref 0–0.51)
EOSINOPHIL NFR BLD: 3.8 % (ref 0–6.9)
ERYTHROCYTE [DISTWIDTH] IN BLOOD BY AUTOMATED COUNT: 40 FL (ref 35.9–50)
GLOBULIN SER CALC-MCNC: 2.3 G/DL (ref 1.9–3.5)
GLUCOSE SERPL-MCNC: 103 MG/DL (ref 65–99)
HCT VFR BLD AUTO: 44.2 % (ref 37–47)
HDLC SERPL-MCNC: 42 MG/DL
HGB BLD-MCNC: 15 G/DL (ref 12–16)
IMM GRANULOCYTES # BLD AUTO: 0.01 K/UL (ref 0–0.11)
IMM GRANULOCYTES NFR BLD AUTO: 0.2 % (ref 0–0.9)
LDLC SERPL CALC-MCNC: 95 MG/DL
LYMPHOCYTES # BLD AUTO: 2.35 K/UL (ref 1–4.8)
LYMPHOCYTES NFR BLD: 38.3 % (ref 22–41)
MCH RBC QN AUTO: 29.9 PG (ref 27–33)
MCHC RBC AUTO-ENTMCNC: 33.9 G/DL (ref 33.6–35)
MCV RBC AUTO: 88.2 FL (ref 81.4–97.8)
MONOCYTES # BLD AUTO: 0.43 K/UL (ref 0–0.85)
MONOCYTES NFR BLD AUTO: 7 % (ref 0–13.4)
NEUTROPHILS # BLD AUTO: 3.02 K/UL (ref 2–7.15)
NEUTROPHILS NFR BLD: 49.2 % (ref 44–72)
NRBC # BLD AUTO: 0 K/UL
NRBC BLD-RTO: 0 /100 WBC
PLATELET # BLD AUTO: 312 K/UL (ref 164–446)
PMV BLD AUTO: 9.9 FL (ref 9–12.9)
POTASSIUM SERPL-SCNC: 4 MMOL/L (ref 3.6–5.5)
PROT SERPL-MCNC: 6.5 G/DL (ref 6–8.2)
RBC # BLD AUTO: 5.01 M/UL (ref 4.2–5.4)
SODIUM SERPL-SCNC: 141 MMOL/L (ref 135–145)
T4 FREE SERPL-MCNC: 1 NG/DL (ref 0.53–1.43)
TRIGL SERPL-MCNC: 129 MG/DL (ref 0–149)
TSH SERPL DL<=0.005 MIU/L-ACNC: 1.4 UIU/ML (ref 0.38–5.33)
VIT B12 SERPL-MCNC: 378 PG/ML (ref 211–911)
WBC # BLD AUTO: 6.1 K/UL (ref 4.8–10.8)

## 2018-07-14 PROCEDURE — 36415 COLL VENOUS BLD VENIPUNCTURE: CPT

## 2018-07-14 PROCEDURE — 82607 VITAMIN B-12: CPT

## 2018-07-14 PROCEDURE — 84443 ASSAY THYROID STIM HORMONE: CPT

## 2018-07-14 PROCEDURE — 82172 ASSAY OF APOLIPOPROTEIN: CPT

## 2018-07-14 PROCEDURE — 84439 ASSAY OF FREE THYROXINE: CPT

## 2018-07-14 PROCEDURE — 85025 COMPLETE CBC W/AUTO DIFF WBC: CPT

## 2018-07-14 PROCEDURE — 80061 LIPID PANEL: CPT

## 2018-07-14 PROCEDURE — 82306 VITAMIN D 25 HYDROXY: CPT

## 2018-07-14 PROCEDURE — 80053 COMPREHEN METABOLIC PANEL: CPT

## 2018-07-16 LAB — APO A-I SERPL-MCNC: 130 MG/DL (ref 101–199)

## 2018-07-19 NOTE — TELEPHONE ENCOUNTER
Pt called wants to know what is covered under her insurance for BC. Please call pt back.   Scribe Attestation (For Scribes USE Only)... Attending Attestation (For Attendings USE Only).../Scribe Attestation (For Scribes USE Only)...

## 2018-07-23 ENCOUNTER — HOSPITAL ENCOUNTER (OUTPATIENT)
Dept: LAB | Facility: MEDICAL CENTER | Age: 33
End: 2018-07-23
Attending: OBSTETRICS & GYNECOLOGY
Payer: COMMERCIAL

## 2018-07-23 PROCEDURE — 83520 IMMUNOASSAY QUANT NOS NONAB: CPT

## 2018-07-23 PROCEDURE — 36415 COLL VENOUS BLD VENIPUNCTURE: CPT

## 2018-07-25 LAB — MIS SERPL-MCNC: 4.48 NG/ML (ref 0.18–11.71)

## 2018-09-13 ENCOUNTER — OFFICE VISIT (OUTPATIENT)
Dept: NEUROLOGY | Facility: MEDICAL CENTER | Age: 33
End: 2018-09-13
Payer: COMMERCIAL

## 2018-09-13 VITALS
WEIGHT: 187.2 LBS | OXYGEN SATURATION: 95 % | DIASTOLIC BLOOD PRESSURE: 74 MMHG | SYSTOLIC BLOOD PRESSURE: 116 MMHG | BODY MASS INDEX: 27.73 KG/M2 | TEMPERATURE: 97.5 F | HEIGHT: 69 IN | RESPIRATION RATE: 18 BRPM | HEART RATE: 84 BPM

## 2018-09-13 PROCEDURE — 64615 CHEMODENERV MUSC MIGRAINE: CPT | Performed by: NURSE PRACTITIONER

## 2018-09-13 RX ORDER — KETOROLAC TROMETHAMINE 30 MG/ML
60 INJECTION, SOLUTION INTRAMUSCULAR; INTRAVENOUS ONCE
Status: COMPLETED | OUTPATIENT
Start: 2018-09-13 | End: 2018-09-13

## 2018-09-13 RX ORDER — ZOLMITRIPTAN 5 MG/1
TABLET, ORALLY DISINTEGRATING ORAL
Qty: 9 TAB | Refills: 5 | Status: SHIPPED | OUTPATIENT
Start: 2018-09-13 | End: 2019-09-03

## 2018-09-13 RX ADMIN — KETOROLAC TROMETHAMINE 60 MG: 30 INJECTION, SOLUTION INTRAMUSCULAR; INTRAVENOUS at 08:26

## 2018-09-13 ASSESSMENT — PAIN SCALES - GENERAL: PAINLEVEL: NO PAIN

## 2018-09-13 NOTE — PROGRESS NOTES
Subjective:      Roxanna Guzmán is a 32 y.o. female who presents with Botox Injection (Chronic migraine)            HPI  Here for Botox today.  Experiences a wear off pattern at 10 weeks typically.    Current Outpatient Prescriptions   Medication Sig Dispense Refill   • VIMPAT 150 MG Tab TAKE 1 TABLET BY MOUTH TWICE DAILY FOR 30 DAYS 60 Tab 5   • rizatriptan (MAXALT-MLT) 10 MG disintegrating tablet TAKE 1/2 TO 1 TAB BY MOUTH AT ONSET OF HEADACHE MAY REPEAT DOSE IN 2 HOURS IF UNRELIVED MAX 2 12 Tab 5   • etonogestrel (NEXPLANON) 68 MG Implant implant Inject 1 Each as instructed Once.     • aripiprazole (ABILIFY) 2 MG tablet Take 2 mg by mouth every day.     • escitalopram (LEXAPRO) 20 MG tablet Take 20 mg by mouth every day.     • alprazolam (XANAX) 0.5 MG Tab Take 0.5 mg by mouth at bedtime as needed for Sleep.       Current Facility-Administered Medications   Medication Dose Route Frequency Provider Last Rate Last Dose   • onabotulinum toxin type A SOLR 155 Units  155 Units Injection Once IFRAH JiangPCORAL SAAVEDRA       Objective:     There were no vitals taken for this visit.     Physical Exam            Assessment/Plan:     Chronic Migraine:  Botox therapy has reduced patient’s migraines by more than 7 days and/or 100 hours per month.    I treated this patient in clinic today with BotoxA 155 units according to the dosing/injection paradigm currently mandated by the FDA for the management of chronic migraine. Specifically, I injected 5 units to the procerus, 5 units to the corrugators bilaterally, a total of 20 units to the frontalis musculature, 20 units to the temporalis bilaterally, 15 units to the occipitalis bilaterally, 10 units to the cervical paraspinals bilaterally and 15 units to the trapezius musculature bilaterally. The remainder of the Botox 200 units mixed but not administered was discarded as wastage per FDA guidelines.    Toradol 60mg IM provided per MA.    New Rx for Zomig ZMT provided  as she has been taking Maxalt 20mg at onset of migraines and developing muscle tension of the jaw and neck (side effect of triptan).    Return for follow-up in 3 months for serial set of Botox.

## 2018-12-06 ENCOUNTER — OFFICE VISIT (OUTPATIENT)
Dept: NEUROLOGY | Facility: MEDICAL CENTER | Age: 33
End: 2018-12-06
Payer: COMMERCIAL

## 2018-12-06 VITALS
SYSTOLIC BLOOD PRESSURE: 118 MMHG | OXYGEN SATURATION: 97 % | WEIGHT: 186 LBS | DIASTOLIC BLOOD PRESSURE: 76 MMHG | HEART RATE: 85 BPM | HEIGHT: 69 IN | TEMPERATURE: 97.3 F | BODY MASS INDEX: 27.55 KG/M2

## 2018-12-06 PROBLEM — G43.709 CHRONIC MIGRAINE W/O AURA W/O STATUS MIGRAINOSUS, NOT INTRACTABLE: Status: RESOLVED | Noted: 2018-01-09 | Resolved: 2018-12-06

## 2018-12-06 PROCEDURE — 64615 CHEMODENERV MUSC MIGRAINE: CPT | Performed by: NURSE PRACTITIONER

## 2018-12-06 RX ORDER — FROVATRIPTAN SUCCINATE 2.5 MG/1
TABLET, FILM COATED ORAL
Qty: 69 TAB | Refills: 5 | Status: SHIPPED | OUTPATIENT
Start: 2018-12-06 | End: 2019-02-11 | Stop reason: SDUPTHER

## 2018-12-06 RX ORDER — KETOROLAC TROMETHAMINE 30 MG/ML
60 INJECTION, SOLUTION INTRAMUSCULAR; INTRAVENOUS ONCE
Status: COMPLETED | OUTPATIENT
Start: 2018-12-06 | End: 2018-12-06

## 2018-12-06 RX ADMIN — KETOROLAC TROMETHAMINE 60 MG: 30 INJECTION, SOLUTION INTRAMUSCULAR; INTRAVENOUS at 08:37

## 2018-12-06 NOTE — PROGRESS NOTES
"Subjective:      Roxanna Guzmán is a 33 y.o. female who presents with Botox Injection            HPI  Here for Botox today.  Migraine profile has improved but is still persistent at this time.  She is interested in other options to add in addition to Botox.    Current Outpatient Prescriptions   Medication Sig Dispense Refill   • NON SPECIFIED Ajovy injection 225mg/1.5ml   Use 225mg SQ one time monthly. 1 Each 5   • Frovatriptan Succinate 2.5 MG Tab Take 1/2-1 tablet po at onset of headache, may repeat dose in 2 hours if unrelieved.  Do not exceed more than 2 tablets in 24 hours. 69 Tab 5   • rizatriptan (MAXALT-MLT) 10 MG disintegrating tablet TAKE 1/2 TO 1 TAB BY MOUTH AT ONSET OF HEADACHE MAY REPEAT DOSE IN 2 HRS IF UNRELIVED MAY 2/24HR 12 Tab 11   • zolmitriptan (ZOMIG-ZMT) 5 MG disintegrating tablet Take 1/2-1 tablet po at onset of headache, may repeat dose in 2 hours if unrelieved.  Do not exceed more than 2 tablets in 24 hours. 9 Tab 5   • VIMPAT 150 MG Tab TAKE 1 TABLET BY MOUTH TWICE DAILY FOR 30 DAYS 60 Tab 5   • etonogestrel (NEXPLANON) 68 MG Implant implant Inject 1 Each as instructed Once.     • escitalopram (LEXAPRO) 20 MG tablet Take 20 mg by mouth every day.     • alprazolam (XANAX) 0.5 MG Tab Take 0.5 mg by mouth at bedtime as needed for Sleep.       No current facility-administered medications for this visit.          ROS       Objective:     /76 (BP Location: Left arm, Patient Position: Sitting, BP Cuff Size: Adult)   Pulse 85   Temp 36.3 °C (97.3 °F) (Temporal)   Ht 1.753 m (5' 9\")   Wt 84.4 kg (186 lb)   SpO2 97%   BMI 27.47 kg/m²      Physical Exam            Assessment/Plan:     Chronic Migraine:  Botox therapy has reduced patient’s migraines by more than 7 days and/or 100 hours per month.     I treated this patient in clinic today with BotoxA 155 units according to the dosing/injection paradigm currently mandated by the FDA for the management of chronic migraine. Specifically, I " injected 5 units to the procerus, 5 units to the corrugators bilaterally, a total of 20 units to the frontalis musculature, 20 units to the temporalis bilaterally, 15 units to the occipitalis bilaterally, 10 units to the cervical paraspinals bilaterally and 15 units to the trapezius musculature bilaterally. The remainder of the Botox 200 units mixed but not administered was discarded as wastage per FDA guidelines.     Toradol 60mg IM provided per MA.     New Rx for Ajovy to be initiated.  Ms Guzmán has chronic daily migraines, defined as having 15 or more headaches days per month over a minimum of the last three months. Episodes last more than 4 hours (if untreated). Previous treatments have included beta-blockers such as propranolol, anti-epileptics such as Topamax and klonopin, NSAIDs such as naproxen, anti-depressants such as amitriptyline, and triptans. The patients has not responded to any of these treatments. At this point the only option left would be to use the CGRP modulator in addition to Botox for chronic migraine.     Return for follow-up in 3 months for serial set of Botox.

## 2019-02-08 ENCOUNTER — TELEPHONE (OUTPATIENT)
Dept: NEUROLOGY | Facility: MEDICAL CENTER | Age: 34
End: 2019-02-08

## 2019-02-08 NOTE — TELEPHONE ENCOUNTER
Per Dom sent me a staff message letting me know that this patient has a question regarding her Rx Frovatriptan, I called back and no answer so I LVM letting her know to call me back. I also mentioned that the PA submitted on 12/27/18 was approved just in case if this was her question.

## 2019-02-11 ENCOUNTER — TELEPHONE (OUTPATIENT)
Dept: NEUROLOGY | Facility: MEDICAL CENTER | Age: 34
End: 2019-02-11

## 2019-02-11 DIAGNOSIS — G40.909 SEIZURE DISORDER (HCC): ICD-10-CM

## 2019-02-11 RX ORDER — FROVATRIPTAN SUCCINATE 2.5 MG/1
TABLET, FILM COATED ORAL
Qty: 9 TAB | Refills: 11 | Status: SHIPPED
Start: 2019-02-11 | End: 2019-08-11

## 2019-02-11 NOTE — TELEPHONE ENCOUNTER
Pt called me back and LVM letting me know that even if her INS approved PA her frovatriptan they will only cover 9 pills every 15 days pt is requesting to have script changed, current script is #69 with 5 refills. Tried contacting pt multiple not able to reach and sent to , she also mentioned that she is having multiple breakthrough seizures and is requesting to increase vimpat. I will call pt back this afternoon to get more information.

## 2019-02-13 RX ORDER — LACOSAMIDE 200 MG/1
200 TABLET ORAL 2 TIMES DAILY
Qty: 60 TAB | Refills: 5 | Status: SHIPPED
Start: 2019-02-13 | End: 2019-08-13

## 2019-02-13 NOTE — TELEPHONE ENCOUNTER
I have e-scribed a new Frova prescription and printed plain paper Vimpat dose increase.  Please call and leave a message with this detail.

## 2019-02-13 NOTE — TELEPHONE ENCOUNTER
Left detailed VM to pt about Rx's prescribed, I let her know to call me back if she has further questions.

## 2019-03-05 ENCOUNTER — OFFICE VISIT (OUTPATIENT)
Dept: NEUROLOGY | Facility: MEDICAL CENTER | Age: 34
End: 2019-03-05
Payer: COMMERCIAL

## 2019-03-05 VITALS
OXYGEN SATURATION: 96 % | HEART RATE: 79 BPM | BODY MASS INDEX: 27.7 KG/M2 | SYSTOLIC BLOOD PRESSURE: 114 MMHG | TEMPERATURE: 97.2 F | HEIGHT: 69 IN | WEIGHT: 187 LBS | DIASTOLIC BLOOD PRESSURE: 70 MMHG

## 2019-03-05 PROCEDURE — 64615 CHEMODENERV MUSC MIGRAINE: CPT | Performed by: NURSE PRACTITIONER

## 2019-03-05 RX ORDER — KETOROLAC TROMETHAMINE 30 MG/ML
60 INJECTION, SOLUTION INTRAMUSCULAR; INTRAVENOUS ONCE
Status: COMPLETED | OUTPATIENT
Start: 2019-03-05 | End: 2019-03-05

## 2019-03-05 RX ADMIN — KETOROLAC TROMETHAMINE 60 MG: 30 INJECTION, SOLUTION INTRAMUSCULAR; INTRAVENOUS at 09:11

## 2019-03-05 ASSESSMENT — PATIENT HEALTH QUESTIONNAIRE - PHQ9: CLINICAL INTERPRETATION OF PHQ2 SCORE: 0

## 2019-03-05 NOTE — PROGRESS NOTES
"Subjective:      Roxanna Guzmán is a 33 y.o. female who presents with Procedure (botox)            HPI  Here for Botox today.    Current Outpatient Prescriptions   Medication Sig Dispense Refill   • lacosamide (VIMPAT) 200 MG Tab tablet Take 200 mg by mouth 2 Times a Day for 181 days. 60 Tab 5   • Frovatriptan Succinate 2.5 MG Tab Take 1/2-1 tablet po at onset of headache, may repeat dose in 2 hours if unrelieved.  Do not exceed more than 2 tablets in 24 hours.  15-Day supply. 9 Tab 11   • NON SPECIFIED Ajovy injection 225mg/1.5ml   Use 225mg SQ one time monthly. 1 Each 5   • etonogestrel (NEXPLANON) 68 MG Implant implant Inject 1 Each as instructed Once.     • escitalopram (LEXAPRO) 20 MG tablet Take 20 mg by mouth every day.     • rizatriptan (MAXALT-MLT) 10 MG disintegrating tablet TAKE 1/2 TO 1 TAB BY MOUTH AT ONSET OF HEADACHE MAY REPEAT DOSE IN 2 HRS IF UNRELIVED MAY 2/24HR 12 Tab 11   • zolmitriptan (ZOMIG-ZMT) 5 MG disintegrating tablet Take 1/2-1 tablet po at onset of headache, may repeat dose in 2 hours if unrelieved.  Do not exceed more than 2 tablets in 24 hours. 9 Tab 5   • alprazolam (XANAX) 0.5 MG Tab Take 0.5 mg by mouth at bedtime as needed for Sleep.       Current Facility-Administered Medications   Medication Dose Route Frequency Provider Last Rate Last Dose   • onabotulinum toxin type A SOLR 155 Units  155 Units Injection Once Lynn Gonzalesner, A.P.N.       • ketorolac (TORADOL) injection 60 mg  60 mg Intramuscular Once Lynn HERNÁNDEZ Kumar, A.P.N.           ROS       Objective:     /70   Pulse 79   Temp 36.2 °C (97.2 °F) (Temporal)   Ht 1.753 m (5' 9\")   Wt 84.8 kg (187 lb)   SpO2 96%   BMI 27.62 kg/m²      Physical Exam            Assessment/Plan:     Chronic Migraine:  Botox therapy has reduced patient’s migraines by more than 7 days and/or 100 hours per month.     I treated this patient in clinic today with BotoxA 155 units according to the dosing/injection paradigm currently mandated " by the FDA for the management of chronic migraine. Specifically, I injected 5 units to the procerus, 5 units to the corrugators bilaterally, a total of 20 units to the frontalis musculature, 20 units to the temporalis bilaterally, 15 units to the occipitalis bilaterally, 10 units to the cervical paraspinals bilaterally and 15 units to the trapezius musculature bilaterally. The remainder of the Botox 200 units mixed but not administered was discarded as wastage per FDA guidelines.     Toradol 60mg IM provided per MA.     New Rx for Ajovy to be initiated.  Ms Guzmán has chronic daily migraines, defined as having 15 or more headaches days per month over a minimum of the last three months. Episodes last more than 4 hours (if untreated). Previous treatments have included beta-blockers such as propranolol, anti-epileptics such as Topamax and klonopin, NSAIDs such as naproxen, anti-depressants such as amitriptyline, and triptans. The patients has not responded to any of these treatments. At this point the only option left would be to use the CGRP modulator in addition to Botox for chronic migraine.     Return for follow-up in 3 months for serial set of Botox.

## 2019-05-21 ENCOUNTER — TELEPHONE (OUTPATIENT)
Dept: NEUROLOGY | Facility: MEDICAL CENTER | Age: 34
End: 2019-05-21

## 2019-05-21 NOTE — TELEPHONE ENCOUNTER
"Patient called and is asking for a prescription of the Ketorolac. Her VM is asking for \"30 mg #10\"    She is asking for it to be on plain paper so I can fax it to her office so she has proof of the medication as she is traveling out of the country this Thursday.     Please advise.     "

## 2019-06-11 ENCOUNTER — OFFICE VISIT (OUTPATIENT)
Dept: NEUROLOGY | Facility: MEDICAL CENTER | Age: 34
End: 2019-06-11
Payer: COMMERCIAL

## 2019-06-11 VITALS
SYSTOLIC BLOOD PRESSURE: 118 MMHG | WEIGHT: 180 LBS | HEART RATE: 85 BPM | RESPIRATION RATE: 16 BRPM | HEIGHT: 69 IN | DIASTOLIC BLOOD PRESSURE: 72 MMHG | OXYGEN SATURATION: 98 % | BODY MASS INDEX: 26.66 KG/M2

## 2019-06-11 PROCEDURE — 64615 CHEMODENERV MUSC MIGRAINE: CPT | Performed by: NURSE PRACTITIONER

## 2019-06-11 RX ORDER — RIZATRIPTAN BENZOATE 10 MG/1
TABLET, ORALLY DISINTEGRATING ORAL
Qty: 18 TAB | Refills: 11 | Status: SHIPPED | OUTPATIENT
Start: 2019-06-11 | End: 2020-06-15

## 2019-06-11 RX ORDER — ALPRAZOLAM 1 MG/1
2 TABLET ORAL NIGHTLY PRN
Refills: 2 | COMMUNITY
Start: 2019-06-02 | End: 2021-02-22

## 2019-06-11 RX ORDER — KETOROLAC TROMETHAMINE 30 MG/ML
30 INJECTION, SOLUTION INTRAMUSCULAR; INTRAVENOUS ONCE
Status: COMPLETED | OUTPATIENT
Start: 2019-06-11 | End: 2019-06-11

## 2019-06-11 RX ADMIN — KETOROLAC TROMETHAMINE 30 MG: 30 INJECTION, SOLUTION INTRAMUSCULAR; INTRAVENOUS at 08:16

## 2019-06-11 NOTE — PROGRESS NOTES
"Subjective:      Roxanna Guzmán is a 33 y.o. female who presents with Botox Injection (Chronic migraine)            HPI Here for Botox today.    Needing a refill for Maxalt MLT's.    Current Outpatient Prescriptions   Medication Sig Dispense Refill   • ALPRAZolam (XANAX) 1 MG Tab TAKE 1 TABLET (1 MG) BY MOUTH DAILY AS NEEDED FOR ANXIETY  2   • rizatriptan (MAXALT-MLT) 10 MG disintegrating tablet TAKE 1/2 TO 1 TAB BY MOUTH AT ONSET OF HEADACHE MAY REPEAT DOSE IN 2 HRS IF UNRELIVED MAY 2/24HR.  10-Day supply 18 Tab 11   • lacosamide (VIMPAT) 200 MG Tab tablet Take 200 mg by mouth 2 Times a Day for 181 days. 60 Tab 5   • Frovatriptan Succinate 2.5 MG Tab Take 1/2-1 tablet po at onset of headache, may repeat dose in 2 hours if unrelieved.  Do not exceed more than 2 tablets in 24 hours.  15-Day supply. 9 Tab 11   • NON SPECIFIED Ajovy injection 225mg/1.5ml   Use 225mg SQ one time monthly. 1 Each 5   • etonogestrel (NEXPLANON) 68 MG Implant implant Inject 1 Each as instructed Once.     • escitalopram (LEXAPRO) 20 MG tablet Take 20 mg by mouth every day.     • NON SPECIFIED Ketoralac 30mg tablets  Take one table po Q6 hour prn migraine pain.  Do not exceed more than 2 tablets in 24 hours. 10 Each 0   • zolmitriptan (ZOMIG-ZMT) 5 MG disintegrating tablet Take 1/2-1 tablet po at onset of headache, may repeat dose in 2 hours if unrelieved.  Do not exceed more than 2 tablets in 24 hours. 9 Tab 5     No current facility-administered medications for this visit.          ROS       Objective:     /72 (BP Location: Left arm, Patient Position: Sitting, BP Cuff Size: Adult)   Pulse 85   Resp 16   Ht 1.753 m (5' 9.02\")   Wt 81.6 kg (180 lb)   SpO2 98%   BMI 26.57 kg/m²      Physical Exam            Assessment/Plan:     Chronic Migraine:  Botox therapy has reduced patient’s migraines by more than 7 days and/or 100 hours per month.     I treated this patient in clinic today with BotoxA 155 units according to the dosing/injection " paradigm currently mandated by the FDA for the management of chronic migraine. Specifically, I injected 5 units to the procerus, 5 units to the corrugators bilaterally, a total of 20 units to the frontalis musculature, 20 units to the temporalis bilaterally, 15 units to the occipitalis bilaterally, 10 units to the cervical paraspinals bilaterally and 15 units to the trapezius musculature bilaterally. The remainder of the Botox 200 units mixed but not administered was discarded as wastage per FDA guidelines.     Education/counseling/reduction in medications if pt has medication overuse headache; Frequency of headaches is >15 days monthly with at least 8 migraines monthly; Migraines include at least two of the following: worsened with activity or avoidance of activity with migraines (ie they go lie down), moderate to severe pain intensity, pulsing headache, unilateral headache AND they have either nausea or vomiting OR sensitivity to light and sound.     Although this patient is responding to botox they are NOT migraine free, however, and it is my recommendation Botox be continued at this time.    This patient has chronic daily migraines, defined as having 15 or more headaches days per month, 8 of which are migraines, over a minimum of the last three months. Episodes last more than 4 hours (untreated).  Pt has 2 or more of following (see initial note) -  Headache worsened with activity, pain is moderate to severe intensity, pulsing in nature, unilateral and patient either has nausea/vomiting OR sensitivity to light and sound.  This patient does/does not also have medication overuse headache.  However our plan to address this includes education, occipital nerve shots, restricting use as directed.    Toradol 30mg IM provided per MA.    Return for follow-up in 3 months for serial set of Botox and evaluation of need for Botox.

## 2019-08-10 ENCOUNTER — OFFICE VISIT (OUTPATIENT)
Dept: URGENT CARE | Facility: CLINIC | Age: 34
End: 2019-08-10
Payer: COMMERCIAL

## 2019-08-10 VITALS
HEART RATE: 84 BPM | HEIGHT: 69 IN | BODY MASS INDEX: 26.45 KG/M2 | DIASTOLIC BLOOD PRESSURE: 78 MMHG | WEIGHT: 178.6 LBS | SYSTOLIC BLOOD PRESSURE: 118 MMHG | TEMPERATURE: 98.7 F | OXYGEN SATURATION: 100 % | RESPIRATION RATE: 20 BRPM

## 2019-08-10 DIAGNOSIS — J01.40 ACUTE PANSINUSITIS, RECURRENCE NOT SPECIFIED: ICD-10-CM

## 2019-08-10 DIAGNOSIS — B37.9 ANTIBIOTIC-INDUCED YEAST INFECTION: ICD-10-CM

## 2019-08-10 DIAGNOSIS — H69.93 DYSFUNCTION OF BOTH EUSTACHIAN TUBES: ICD-10-CM

## 2019-08-10 DIAGNOSIS — T36.95XA ANTIBIOTIC-INDUCED YEAST INFECTION: ICD-10-CM

## 2019-08-10 DIAGNOSIS — G43.809 OTHER MIGRAINE WITHOUT STATUS MIGRAINOSUS, NOT INTRACTABLE: ICD-10-CM

## 2019-08-10 PROCEDURE — 99203 OFFICE O/P NEW LOW 30 MIN: CPT | Performed by: PHYSICIAN ASSISTANT

## 2019-08-10 RX ORDER — AMOXICILLIN AND CLAVULANATE POTASSIUM 875; 125 MG/1; MG/1
1 TABLET, FILM COATED ORAL 2 TIMES DAILY
Qty: 14 TAB | Refills: 0 | Status: SHIPPED | OUTPATIENT
Start: 2019-08-10 | End: 2019-08-17

## 2019-08-10 RX ORDER — KETOROLAC TROMETHAMINE 30 MG/ML
15 INJECTION, SOLUTION INTRAMUSCULAR; INTRAVENOUS EVERY 6 HOURS PRN
Status: ON HOLD | COMMUNITY
End: 2021-02-17

## 2019-08-10 RX ORDER — OMEGA-3 FATTY ACIDS 500 MG
CAPSULE ORAL
COMMUNITY
End: 2019-09-03

## 2019-08-10 RX ORDER — METHYLPREDNISOLONE 4 MG/1
TABLET ORAL
Qty: 1 KIT | Refills: 0 | Status: SHIPPED | OUTPATIENT
Start: 2019-08-10 | End: 2019-08-17

## 2019-08-10 RX ORDER — FLUCONAZOLE 150 MG/1
150 TABLET ORAL DAILY
Qty: 1 TAB | Refills: 0 | Status: SHIPPED | OUTPATIENT
Start: 2019-08-10 | End: 2019-08-11

## 2019-08-10 RX ORDER — BUSPIRONE HYDROCHLORIDE 10 MG/1
10 TABLET ORAL 2 TIMES DAILY
COMMUNITY
End: 2019-11-26

## 2019-08-11 ASSESSMENT — ENCOUNTER SYMPTOMS
CHILLS: 0
MIGRAINE HEADACHES: 1
BRUISES/BLEEDS EASILY: 0
FEVER: 0
HEADACHES: 1
PALPITATIONS: 0
PHOTOPHOBIA: 1
SORE THROAT: 0
MYALGIAS: 0
SINUS PRESSURE: 1
DIZZINESS: 0
COUGH: 0
FOCAL WEAKNESS: 0
BLURRED VISION: 0
SHORTNESS OF BREATH: 0
NAUSEA: 0
EYE REDNESS: 0
SINUS PAIN: 1
LOSS OF CONSCIOUSNESS: 0
ABNORMAL BEHAVIOR: 0
VOMITING: 0

## 2019-08-17 ENCOUNTER — OFFICE VISIT (OUTPATIENT)
Dept: URGENT CARE | Facility: CLINIC | Age: 34
End: 2019-08-17
Payer: COMMERCIAL

## 2019-08-17 VITALS
SYSTOLIC BLOOD PRESSURE: 106 MMHG | TEMPERATURE: 98.8 F | WEIGHT: 175 LBS | HEART RATE: 104 BPM | HEIGHT: 69 IN | BODY MASS INDEX: 25.92 KG/M2 | RESPIRATION RATE: 12 BRPM | OXYGEN SATURATION: 97 % | DIASTOLIC BLOOD PRESSURE: 68 MMHG

## 2019-08-17 DIAGNOSIS — H69.93 EUSTACHIAN TUBE DYSFUNCTION, BILATERAL: ICD-10-CM

## 2019-08-17 PROCEDURE — 99214 OFFICE O/P EST MOD 30 MIN: CPT | Performed by: NURSE PRACTITIONER

## 2019-08-17 RX ORDER — METHYLPREDNISOLONE 4 MG/1
4 TABLET ORAL DAILY
Qty: 1 KIT | Refills: 0 | Status: SHIPPED | OUTPATIENT
Start: 2019-08-17 | End: 2019-09-03

## 2019-08-17 RX ORDER — FLUTICASONE PROPIONATE 50 MCG
1 SPRAY, SUSPENSION (ML) NASAL DAILY
Qty: 16 G | Refills: 0 | Status: ON HOLD | OUTPATIENT
Start: 2019-08-17 | End: 2021-10-08

## 2019-08-17 RX ORDER — CEFDINIR 300 MG/1
300 CAPSULE ORAL 2 TIMES DAILY
Qty: 14 CAP | Refills: 0 | Status: SHIPPED | OUTPATIENT
Start: 2019-08-17 | End: 2019-08-24

## 2019-08-17 ASSESSMENT — PAIN SCALES - GENERAL: PAINLEVEL: 7=MODERATE-SEVERE PAIN

## 2019-08-18 ASSESSMENT — ENCOUNTER SYMPTOMS
EYE DISCHARGE: 0
EYE REDNESS: 0
SHORTNESS OF BREATH: 0
CHILLS: 0
FEVER: 0
STRIDOR: 0
COUGH: 0
WHEEZING: 0
SORE THROAT: 0
DIZZINESS: 0
SINUS PAIN: 1
EYE PAIN: 0

## 2019-08-18 NOTE — PROGRESS NOTES
Subjective:     Roxanna Guzmán is a 33 y.o. female who presents for Sinus Problem (x2 weeks, improved after anitibiotic/steroid treatment, symptoms have returned, worse) and Otalgia (bilateral )      Ear pain for a couple of weeks,  3-4 weeks. Finished Medrol dose pack and Augmentin with minimal relief. Sinus, not congested. Has maxillary sinus pressure. No fevers. No ear drainage. No cough.  Had ENT follow up last year for the same. Right ear tube placement, and removed.    Occasional seasonal allergies. Flonase has not helped in the past.   Otalgia    There is pain in both ears. This is a recurrent problem. The current episode started more than 1 month ago. The problem occurs constantly. The problem has been waxing and waning. There has been no fever. Pertinent negatives include no coughing, ear discharge, hearing loss, rash or sore throat.       Past Medical History:   Diagnosis Date   • Depression    • Head ache    • Migraine    • Seizure disorder, complex partial (HCC)    • TMJ arthropathy        Past Surgical History:   Procedure Laterality Date   • HERNIA REPAIR         Social History     Socioeconomic History   • Marital status:      Spouse name: Not on file   • Number of children: Not on file   • Years of education: Not on file   • Highest education level: Not on file   Occupational History   • Not on file   Social Needs   • Financial resource strain: Not on file   • Food insecurity:     Worry: Not on file     Inability: Not on file   • Transportation needs:     Medical: Not on file     Non-medical: Not on file   Tobacco Use   • Smoking status: Never Smoker   • Smokeless tobacco: Never Used   Substance and Sexual Activity   • Alcohol use: Yes     Comment: soc   • Drug use: No   • Sexual activity: Never     Birth control/protection: Patch   Lifestyle   • Physical activity:     Days per week: Not on file     Minutes per session: Not on file   • Stress: Not on file   Relationships   • Social connections:  "    Talks on phone: Not on file     Gets together: Not on file     Attends [employer] service: Not on file     Active member of club or organization: Not on file     Attends meetings of clubs or organizations: Not on file     Relationship status: Not on file   • Intimate partner violence:     Fear of current or ex partner: Not on file     Emotionally abused: Not on file     Physically abused: Not on file     Forced sexual activity: Not on file   Other Topics Concern   • Not on file   Social History Narrative   • Not on file        Family History   Problem Relation Age of Onset   • Hypertension Mother    • Cancer Father         prostate cancer   • Depression Father    • Sexual abuse Maternal Grandmother         No Known Allergies    Review of Systems   Constitutional: Negative for chills and fever.   HENT: Positive for ear pain and sinus pain. Negative for ear discharge, hearing loss, nosebleeds and sore throat.    Eyes: Negative for pain, discharge and redness.   Respiratory: Negative for cough, shortness of breath, wheezing and stridor.    Skin: Negative for rash.   Neurological: Negative for dizziness.   Endo/Heme/Allergies: Positive for environmental allergies.   All other systems reviewed and are negative.       Objective:   /68   Pulse (!) 104   Temp 37.1 °C (98.8 °F) (Temporal)   Resp 12   Ht 1.753 m (5' 9\")   Wt 79.4 kg (175 lb)   SpO2 97%   Breastfeeding? No Comment: no cycle w/Nexplanon  BMI 25.84 kg/m²     Physical Exam   Constitutional: She is oriented to person, place, and time. She appears well-developed. No distress.   HENT:   Head: Normocephalic and atraumatic.   Right Ear: Hearing, external ear and ear canal normal. No lacerations. There is tenderness. No drainage or swelling. No foreign bodies. No mastoid tenderness. Tympanic membrane is bulging. Tympanic membrane is not injected, not perforated and not erythematous. A middle ear effusion is present. No hemotympanum.   Left Ear: " Hearing, external ear and ear canal normal. No lacerations. There is tenderness. No drainage or swelling. No foreign bodies. No mastoid tenderness. Tympanic membrane is bulging. Tympanic membrane is not injected, not perforated and not erythematous. A middle ear effusion is present. No hemotympanum.   Nose: Nose normal. Right sinus exhibits no maxillary sinus tenderness and no frontal sinus tenderness. Left sinus exhibits no maxillary sinus tenderness and no frontal sinus tenderness.   Mouth/Throat: Uvula is midline, oropharynx is clear and moist and mucous membranes are normal. No oropharyngeal exudate.   Clear bilateral effusion.    Eyes: Pupils are equal, round, and reactive to light. Conjunctivae are normal.   Neck: Normal range of motion. Neck supple.   Cardiovascular: Normal rate.   Pulmonary/Chest: Effort normal and breath sounds normal. No respiratory distress.   Musculoskeletal: Normal range of motion.   Lymphadenopathy:     She has no cervical adenopathy.   Neurological: She is alert and oriented to person, place, and time. She has normal strength. No sensory deficit. GCS eye subscore is 4. GCS verbal subscore is 5. GCS motor subscore is 6.   Skin: Skin is warm and dry.   Psychiatric: She has a normal mood and affect. Her speech is normal and behavior is normal. Judgment and thought content normal.   Vitals reviewed.      Assessment/Plan:     1. Eustachian tube dysfunction, bilateral  - REFERRAL TO ENT  - methylPREDNISolone (MEDROL DOSEPAK) 4 MG Tablet Therapy Pack; Take 1 Tab by mouth every day.  Dispense: 1 Kit; Refill: 0  - fluticasone (FLONASE) 50 MCG/ACT nasal spray; Spray 1 Spray in nose every day.  Dispense: 16 g; Refill: 0  -OTC loratadine as directed.    Contingent- cefdinir (OMNICEF) 300 MG Cap; Take 1 Cap by mouth 2 times a day for 7 days.  Dispense: 14 Cap; Refill: 0    Follow up for ear drainage, fever, increased pain or pressure, facial swelling, ciarra changes, shortness of breath, or any  other concerns.     Differential diagnosis, natural history, supportive care, and indications for immediate follow-up discussed.

## 2019-09-03 ENCOUNTER — OFFICE VISIT (OUTPATIENT)
Dept: NEUROLOGY | Facility: MEDICAL CENTER | Age: 34
End: 2019-09-03
Payer: COMMERCIAL

## 2019-09-03 VITALS
HEART RATE: 75 BPM | RESPIRATION RATE: 16 BRPM | TEMPERATURE: 98.4 F | WEIGHT: 175 LBS | DIASTOLIC BLOOD PRESSURE: 70 MMHG | HEIGHT: 69 IN | SYSTOLIC BLOOD PRESSURE: 122 MMHG | OXYGEN SATURATION: 98 % | BODY MASS INDEX: 25.92 KG/M2

## 2019-09-03 PROCEDURE — 64615 CHEMODENERV MUSC MIGRAINE: CPT | Performed by: NURSE PRACTITIONER

## 2019-09-03 RX ORDER — KETOROLAC TROMETHAMINE 30 MG/ML
60 INJECTION, SOLUTION INTRAMUSCULAR; INTRAVENOUS ONCE
Status: COMPLETED | OUTPATIENT
Start: 2019-09-03 | End: 2019-09-03

## 2019-09-03 RX ADMIN — KETOROLAC TROMETHAMINE 60 MG: 30 INJECTION, SOLUTION INTRAMUSCULAR; INTRAVENOUS at 09:15

## 2019-09-03 NOTE — PROGRESS NOTES
Subjective:      Roxanna Guzmán is a 33 y.o. female who presents with Botox Injection (Chronic migraine)            HPI  Here for Botox today.     She notices great relief of tension in the neck and shoulders when Botox is active.    Still has X2 Ajovy doses in her fridge.    Seeing ENT for inner ear congestion the middle of September.    Current Outpatient Medications   Medication Sig Dispense Refill   • VIMPAT 200 MG Tab tablet TAKE 1 TABLET BY MOUTH TWICE A DAY 60 Tab 5   • fluticasone (FLONASE) 50 MCG/ACT nasal spray Spray 1 Spray in nose every day. 16 g 0   • ketorolac (TORADOL) 30 MG/ML Solution 15 mg by Intravenous route every 6 hours as needed.     • Fe Bisgly-Fe Polysac-B12-Zinc (MAXFE PO) Take 10 mg by mouth two times a day may change times on alt/days.     • Pseudoephedrine-guaiFENesin (MUCINEX D PO) Take  by mouth.     • Pseudoephedrine HCl (SUDAFED PO) Take  by mouth.     • busPIRone (BUSPAR) 10 MG Tab tablet Take 10 mg by mouth 2 times a day.     • ALPRAZolam (XANAX) 1 MG Tab TAKE 1 TABLET (1 MG) BY MOUTH DAILY AS NEEDED FOR ANXIETY  2   • rizatriptan (MAXALT-MLT) 10 MG disintegrating tablet TAKE 1/2 TO 1 TAB BY MOUTH AT ONSET OF HEADACHE MAY REPEAT DOSE IN 2 HRS IF UNRELIVED MAY 2/24HR.  10-Day supply 18 Tab 11   • NON SPECIFIED Ketoralac 30mg tablets  Take one table po Q6 hour prn migraine pain.  Do not exceed more than 2 tablets in 24 hours. 10 Each 0   • NON SPECIFIED Ajovy injection 225mg/1.5ml   Use 225mg SQ one time monthly. 1 Each 5   • etonogestrel (NEXPLANON) 68 MG Implant implant Inject 1 Each as instructed Once.     • escitalopram (LEXAPRO) 20 MG tablet Take 20 mg by mouth every day.     • methylPREDNISolone (MEDROL DOSEPAK) 4 MG Tablet Therapy Pack Take 1 Tab by mouth every day. 1 Kit 0   • Omega-3 Fatty Acids (MAXEPA) 1000 MG Cap Take  by mouth.     • zolmitriptan (ZOMIG-ZMT) 5 MG disintegrating tablet Take 1/2-1 tablet po at onset of headache, may repeat dose in 2 hours if unrelieved.  Do  "not exceed more than 2 tablets in 24 hours. (Patient not taking: Reported on 8/10/2019) 9 Tab 5     Current Facility-Administered Medications   Medication Dose Route Frequency Provider Last Rate Last Dose   • onabotulinum toxin type A SOLR 155 Units  155 Units Injection Once CRIS Jiang       Objective:     /70 (BP Location: Right arm, Patient Position: Sitting, BP Cuff Size: Adult)   Pulse 75   Temp 36.9 °C (98.4 °F) (Temporal)   Resp 16   Ht 1.753 m (5' 9.02\")   Wt 79.4 kg (175 lb)   SpO2 98%   BMI 25.83 kg/m²      Physical Exam            Assessment/Plan:     Chronic Migraine:  Botox therapy has reduced patient’s migraines by more than 7 days and/or 100 hours per month.     I treated this patient in clinic today with BotoxA 155 units according to the dosing/injection paradigm currently mandated by the FDA for the management of chronic migraine. Specifically, I injected 5 units to the procerus, 5 units to the corrugators bilaterally, a total of 20 units to the frontalis musculature, 20 units to the temporalis bilaterally, 15 units to the occipitalis bilaterally, 10 units to the cervical paraspinals bilaterally and 15 units to the trapezius musculature bilaterally. The remainder of the Botox 200 units mixed but not administered was discarded as wastage per FDA guidelines.     Education/counseling/reduction in medications if pt has medication overuse headache; Frequency of headaches is >15 days monthly with at least 8 migraines monthly; Migraines include at least two of the following: worsened with activity or avoidance of activity with migraines (ie they go lie down), moderate to severe pain intensity, pulsing headache, unilateral headache AND they have either nausea or vomiting OR sensitivity to light and sound.     Although this patient is responding to botox they are NOT migraine free, however, and it is my recommendation Botox be continued at this time.     This " patient has chronic daily migraines, defined as having 15 or more headaches days per month, 8 of which are migraines, over a minimum of the last three months. Episodes last more than 4 hours (untreated).  Pt has 2 or more of following (see initial note) -  Headache worsened with activity, pain is moderate to severe intensity, pulsing in nature, unilateral and patient either has nausea/vomiting OR sensitivity to light and sound.  This patient does/does not also have medication overuse headache.  However our plan to address this includes education, occipital nerve shots, restricting use as directed.    Toradol 60mg IM provided per MA.     Return for follow-up in 3 months for serial set of Botox and evaluation of need for Botox.

## 2019-09-08 ENCOUNTER — HOSPITAL ENCOUNTER (OUTPATIENT)
Facility: MEDICAL CENTER | Age: 34
End: 2019-09-08
Attending: NURSE PRACTITIONER
Payer: COMMERCIAL

## 2019-09-08 ENCOUNTER — OFFICE VISIT (OUTPATIENT)
Dept: URGENT CARE | Facility: CLINIC | Age: 34
End: 2019-09-08
Payer: COMMERCIAL

## 2019-09-08 VITALS
DIASTOLIC BLOOD PRESSURE: 68 MMHG | SYSTOLIC BLOOD PRESSURE: 110 MMHG | HEART RATE: 72 BPM | BODY MASS INDEX: 25.83 KG/M2 | RESPIRATION RATE: 18 BRPM | OXYGEN SATURATION: 98 % | WEIGHT: 175 LBS | TEMPERATURE: 98.2 F

## 2019-09-08 DIAGNOSIS — N30.00 ACUTE CYSTITIS WITHOUT HEMATURIA: ICD-10-CM

## 2019-09-08 DIAGNOSIS — R30.0 DYSURIA: ICD-10-CM

## 2019-09-08 LAB
APPEARANCE UR: CLEAR
BILIRUB UR STRIP-MCNC: NORMAL MG/DL
COLOR UR AUTO: YELLOW
GLUCOSE UR STRIP.AUTO-MCNC: NORMAL MG/DL
KETONES UR STRIP.AUTO-MCNC: NORMAL MG/DL
LEUKOCYTE ESTERASE UR QL STRIP.AUTO: NORMAL
NITRITE UR QL STRIP.AUTO: NORMAL
PH UR STRIP.AUTO: 7 [PH] (ref 5–8)
PROT UR QL STRIP: NORMAL MG/DL
RBC UR QL AUTO: NORMAL
SP GR UR STRIP.AUTO: 1.02
UROBILINOGEN UR STRIP-MCNC: 0.2 MG/DL

## 2019-09-08 PROCEDURE — 99214 OFFICE O/P EST MOD 30 MIN: CPT | Performed by: NURSE PRACTITIONER

## 2019-09-08 PROCEDURE — 81002 URINALYSIS NONAUTO W/O SCOPE: CPT | Performed by: NURSE PRACTITIONER

## 2019-09-08 PROCEDURE — 87077 CULTURE AEROBIC IDENTIFY: CPT

## 2019-09-08 PROCEDURE — 87186 SC STD MICRODIL/AGAR DIL: CPT

## 2019-09-08 PROCEDURE — 87086 URINE CULTURE/COLONY COUNT: CPT

## 2019-09-08 PROCEDURE — 99000 SPECIMEN HANDLING OFFICE-LAB: CPT | Performed by: NURSE PRACTITIONER

## 2019-09-08 RX ORDER — NITROFURANTOIN 25; 75 MG/1; MG/1
100 CAPSULE ORAL 2 TIMES DAILY
Qty: 10 CAP | Refills: 0 | Status: SHIPPED | OUTPATIENT
Start: 2019-09-08 | End: 2019-09-13

## 2019-09-08 ASSESSMENT — ENCOUNTER SYMPTOMS
FEVER: 0
FLANK PAIN: 0
CHILLS: 0

## 2019-09-08 NOTE — PROGRESS NOTES
Subjective:      Roxanna Guzmán is a 33 y.o. female who presents with Dysuria    Past Medical History:   Diagnosis Date   • Depression    • Head ache    • Migraine    • Seizure disorder, complex partial (HCC)    • TMJ arthropathy      Social History     Socioeconomic History   • Marital status:      Spouse name: Not on file   • Number of children: Not on file   • Years of education: Not on file   • Highest education level: Not on file   Occupational History   • Not on file   Social Needs   • Financial resource strain: Not on file   • Food insecurity:     Worry: Not on file     Inability: Not on file   • Transportation needs:     Medical: Not on file     Non-medical: Not on file   Tobacco Use   • Smoking status: Never Smoker   • Smokeless tobacco: Never Used   Substance and Sexual Activity   • Alcohol use: Yes     Comment: soc   • Drug use: No   • Sexual activity: Never     Birth control/protection: Patch   Lifestyle   • Physical activity:     Days per week: Not on file     Minutes per session: Not on file   • Stress: Not on file   Relationships   • Social connections:     Talks on phone: Not on file     Gets together: Not on file     Attends [employer] service: Not on file     Active member of club or organization: Not on file     Attends meetings of clubs or organizations: Not on file     Relationship status: Not on file   • Intimate partner violence:     Fear of current or ex partner: Not on file     Emotionally abused: Not on file     Physically abused: Not on file     Forced sexual activity: Not on file   Other Topics Concern   • Not on file   Social History Narrative   • Not on file     Family History   Problem Relation Age of Onset   • Hypertension Mother    • Cancer Father         prostate cancer   • Depression Father    • Sexual abuse Maternal Grandmother        Allergies: Patient has no known allergies.            Dysuria    This is a new problem. The current episode started in the past 7 days. The  problem occurs every urination. The problem has been unchanged. The quality of the pain is described as burning and aching. Associated symptoms include frequency and urgency. Pertinent negatives include no chills, flank pain or hematuria. She has tried nothing for the symptoms. The treatment provided no relief.       Review of Systems   Constitutional: Positive for malaise/fatigue. Negative for chills and fever.   Genitourinary: Positive for dysuria, frequency and urgency. Negative for flank pain and hematuria.   Skin: Negative.    All other systems reviewed and are negative.         Objective:     /68   Pulse 72   Temp 36.8 °C (98.2 °F) (Temporal)   Resp 18   Wt 79.4 kg (175 lb)   SpO2 98%   BMI 25.83 kg/m²      Physical Exam   Constitutional: She is oriented to person, place, and time. She appears well-developed and well-nourished.   Cardiovascular: Normal rate and regular rhythm.   Pulmonary/Chest: Effort normal and breath sounds normal.   Abdominal: Soft. She exhibits no distension. There is tenderness. There is no rebound and no guarding.   Positive suprapubic tenderness  No CVAT   Neurological: She is alert and oriented to person, place, and time.   Skin: Skin is warm and dry.   Vitals reviewed.    UA: positive leukocytes          Assessment/Plan:     1. Dysuria    - POCT Urinalysis  -urine culture  -macrobid  -push fluids  -follow up for persistent or worsening of symptoms.

## 2019-09-10 LAB
BACTERIA UR CULT: ABNORMAL
BACTERIA UR CULT: ABNORMAL
SIGNIFICANT IND 70042: ABNORMAL
SITE SITE: ABNORMAL
SOURCE SOURCE: ABNORMAL

## 2019-11-26 ENCOUNTER — OFFICE VISIT (OUTPATIENT)
Dept: NEUROLOGY | Facility: MEDICAL CENTER | Age: 34
End: 2019-11-26
Payer: COMMERCIAL

## 2019-11-26 VITALS
RESPIRATION RATE: 16 BRPM | BODY MASS INDEX: 25.92 KG/M2 | HEIGHT: 69 IN | SYSTOLIC BLOOD PRESSURE: 118 MMHG | TEMPERATURE: 97.5 F | HEART RATE: 89 BPM | OXYGEN SATURATION: 96 % | WEIGHT: 175 LBS | DIASTOLIC BLOOD PRESSURE: 76 MMHG

## 2019-11-26 PROCEDURE — 64615 CHEMODENERV MUSC MIGRAINE: CPT | Performed by: NURSE PRACTITIONER

## 2019-11-26 RX ORDER — KETOROLAC TROMETHAMINE 30 MG/ML
60 INJECTION, SOLUTION INTRAMUSCULAR; INTRAVENOUS ONCE
Status: COMPLETED | OUTPATIENT
Start: 2019-11-26 | End: 2019-11-26

## 2019-11-26 RX ORDER — SERTRALINE HYDROCHLORIDE 100 MG/1
100 TABLET, FILM COATED ORAL
Refills: 2 | COMMUNITY
Start: 2019-11-04 | End: 2020-09-15

## 2019-11-26 RX ADMIN — KETOROLAC TROMETHAMINE 60 MG: 30 INJECTION, SOLUTION INTRAMUSCULAR; INTRAVENOUS at 08:31

## 2019-11-26 NOTE — PROGRESS NOTES
Subjective:      Roxanna Guzmán is a 34 y.o. female who presents with Botox Injection (Chronic migraine)            HPI Here for Botox today.      She notices great relief of tension in the neck and shoulders when Botox is active.       Current Outpatient Medications   Medication Sig Dispense Refill   • sertraline (ZOLOFT) 100 MG Tab Take 100 mg by mouth every day.  2   • VIMPAT 200 MG Tab tablet TAKE 1 TABLET BY MOUTH TWICE A DAY 60 Tab 5   • fluticasone (FLONASE) 50 MCG/ACT nasal spray Spray 1 Spray in nose every day. 16 g 0   • ketorolac (TORADOL) 30 MG/ML Solution 15 mg by Intravenous route every 6 hours as needed.     • Fe Bisgly-Fe Polysac-B12-Zinc (MAXFE PO) Take 10 mg by mouth two times a day may change times on alt/days.     • Pseudoephedrine-guaiFENesin (MUCINEX D PO) Take  by mouth.     • Pseudoephedrine HCl (SUDAFED PO) Take  by mouth.     • ALPRAZolam (XANAX) 1 MG Tab TAKE 1 TABLET (1 MG) BY MOUTH DAILY AS NEEDED FOR ANXIETY  2   • rizatriptan (MAXALT-MLT) 10 MG disintegrating tablet TAKE 1/2 TO 1 TAB BY MOUTH AT ONSET OF HEADACHE MAY REPEAT DOSE IN 2 HRS IF UNRELIVED MAY 2/24HR.  10-Day supply 18 Tab 11   • NON SPECIFIED Ketoralac 30mg tablets  Take one table po Q6 hour prn migraine pain.  Do not exceed more than 2 tablets in 24 hours. 10 Each 0   • NON SPECIFIED Ajovy injection 225mg/1.5ml   Use 225mg SQ one time monthly. 1 Each 5   • etonogestrel (NEXPLANON) 68 MG Implant implant Inject 1 Each as instructed Once.     • busPIRone (BUSPAR) 10 MG Tab tablet Take 10 mg by mouth 2 times a day.     • escitalopram (LEXAPRO) 20 MG tablet Take 20 mg by mouth every day.       Current Facility-Administered Medications   Medication Dose Route Frequency Provider Last Rate Last Dose   • onabotulinum toxin type A SOLR 155 Units  155 Units Injection Once Lynn Kumar, A.P.N.       • ketorolac (TORADOL) injection 60 mg  60 mg Intramuscular Once Lynn Gonzalesner, A.P.N.             ROS       Objective:     /76  "(BP Location: Left arm, Patient Position: Sitting, BP Cuff Size: Adult)   Pulse 89   Temp 36.4 °C (97.5 °F) (Temporal)   Resp 16   Ht 1.753 m (5' 9.02\")   Wt 79.4 kg (175 lb)   SpO2 96%   BMI 25.83 kg/m²      Physical Exam            Assessment/Plan:     Chronic Migraine:  Botox therapy has reduced patient’s migraines by more than 7 days and/or 100 hours per month.     I treated this patient in clinic today with BotoxA 155 units according to the dosing/injection paradigm currently mandated by the FDA for the management of chronic migraine. Specifically, I injected 5 units to the procerus, 5 units to the corrugators bilaterally, a total of 20 units to the frontalis musculature, 20 units to the temporalis bilaterally, 15 units to the occipitalis bilaterally, 10 units to the cervical paraspinals bilaterally and 15 units to the trapezius musculature bilaterally. The remainder of the Botox 200 units mixed but not administered was discarded as wastage per FDA guidelines.     Education/counseling/reduction in medications if pt has medication overuse headache; Frequency of headaches is >15 days monthly with at least 8 migraines monthly; Migraines include at least two of the following: worsened with activity or avoidance of activity with migraines (ie they go lie down), moderate to severe pain intensity, pulsing headache, unilateral headache AND they have either nausea or vomiting OR sensitivity to light and sound.     Although this patient is responding to botox they are NOT migraine free, however, and it is my recommendation Botox be continued at this time.     This patient has chronic daily migraines, defined as having 15 or more headaches days per month, 8 of which are migraines, over a minimum of the last three months. Episodes last more than 4 hours (untreated).  Pt has 2 or more of following (see initial note) -  Headache worsened with activity, pain is moderate to severe intensity, pulsing in nature, " unilateral and patient either has nausea/vomiting OR sensitivity to light and sound.  This patient does/does not also have medication overuse headache.  However our plan to address this includes education, occipital nerve shots, restricting use as directed.     Toradol 60mg IM provided per MA.     Ajovy injection performed per MA per patient request.    Return for follow-up in 3 months for serial set of Botox and evaluation of need for Botox.

## 2020-01-02 DIAGNOSIS — G43.709 CHRONIC MIGRAINE W/O AURA W/O STATUS MIGRAINOSUS, NOT INTRACTABLE: ICD-10-CM

## 2020-01-02 NOTE — PROGRESS NOTES
Subjective:      Roxanna Guzmán is a 33 y.o. female who presents with Migraine (pressure in bilateral ears, pain x 10 days)      Headache    This is a new problem. Episode onset: Started 10 days ago. The problem occurs constantly. The problem has been waxing and waning. The pain is located in the bilateral region. The pain does not radiate. The pain quality is similar to prior headaches. The quality of the pain is described as throbbing and pulsating. The pain is moderate. Associated symptoms include ear pain, phonophobia, photophobia and sinus pressure. Pertinent negatives include no abnormal behavior, blurred vision, coughing, dizziness, eye redness, fever, hearing loss, nausea, sore throat or vomiting. The symptoms are aggravated by bright light and noise. She has tried Excedrin, ketorolac injections and triptans for the symptoms. The treatment provided no relief. Her past medical history is significant for migraine headaches.       Review of Systems   Constitutional: Negative for chills, fever and malaise/fatigue.   HENT: Positive for ear pain, sinus pressure and sinus pain. Negative for congestion, hearing loss and sore throat.    Eyes: Positive for photophobia. Negative for blurred vision and redness.   Respiratory: Negative for cough and shortness of breath.    Cardiovascular: Negative for chest pain and palpitations.   Gastrointestinal: Negative for nausea and vomiting.   Musculoskeletal: Negative for myalgias.   Skin: Negative for rash.   Neurological: Positive for headaches. Negative for dizziness, focal weakness and loss of consciousness.   Endo/Heme/Allergies: Does not bruise/bleed easily.       PMH:  has a past medical history of Depression, Head ache, Migraine, Seizure disorder, complex partial (HCC), and TMJ arthropathy.  MEDS:   Current Outpatient Medications:   •  ketorolac (TORADOL) 30 MG/ML Solution, 15 mg by Intravenous route every 6 hours as needed., Disp: , Rfl:   •  Fe Bisgly-Fe Polysac-B12-Zinc  (MAXFE PO), Take 10 mg by mouth two times a day may change times on alt/days., Disp: , Rfl:   •  Pseudoephedrine-guaiFENesin (MUCINEX D PO), Take  by mouth., Disp: , Rfl:   •  Pseudoephedrine HCl (SUDAFED PO), Take  by mouth., Disp: , Rfl:   •  busPIRone (BUSPAR) 10 MG Tab tablet, Take 10 mg by mouth 2 times a day., Disp: , Rfl:   •  methylPREDNISolone (MEDROL DOSEPAK) 4 MG Tablet Therapy Pack, Take as directed, Disp: 1 Kit, Rfl: 0  •  amoxicillin-clavulanate (AUGMENTIN) 875-125 MG Tab, Take 1 Tab by mouth 2 times a day for 7 days., Disp: 14 Tab, Rfl: 0  •  fluconazole (DIFLUCAN) 150 MG tablet, Take 1 Tab by mouth every day for 1 dose., Disp: 1 Tab, Rfl: 0  •  ALPRAZolam (XANAX) 1 MG Tab, TAKE 1 TABLET (1 MG) BY MOUTH DAILY AS NEEDED FOR ANXIETY, Disp: , Rfl: 2  •  NON SPECIFIED, Ketoralac 30mg tablets Take one table po Q6 hour prn migraine pain.  Do not exceed more than 2 tablets in 24 hours., Disp: 10 Each, Rfl: 0  •  lacosamide (VIMPAT) 200 MG Tab tablet, Take 200 mg by mouth 2 Times a Day for 181 days., Disp: 60 Tab, Rfl: 5  •  etonogestrel (NEXPLANON) 68 MG Implant implant, Inject 1 Each as instructed Once., Disp: , Rfl:   •  escitalopram (LEXAPRO) 20 MG tablet, Take 20 mg by mouth every day., Disp: , Rfl:   •  Omega-3 Fatty Acids (MAXEPA) 1000 MG Cap, Take  by mouth., Disp: , Rfl:   •  rizatriptan (MAXALT-MLT) 10 MG disintegrating tablet, TAKE 1/2 TO 1 TAB BY MOUTH AT ONSET OF HEADACHE MAY REPEAT DOSE IN 2 HRS IF UNRELIVED MAY 2/24HR.  10-Day supply (Patient not taking: Reported on 8/10/2019), Disp: 18 Tab, Rfl: 11  •  Frovatriptan Succinate 2.5 MG Tab, Take 1/2-1 tablet po at onset of headache, may repeat dose in 2 hours if unrelieved.  Do not exceed more than 2 tablets in 24 hours. 15-Day supply. (Patient not taking: Reported on 8/10/2019), Disp: 9 Tab, Rfl: 11  •  NON SPECIFIED, Ajovy injection 225mg/1.5ml  Use 225mg SQ one time monthly. (Patient not taking: Reported on 8/10/2019), Disp: 1 Each, Rfl: 5  •   "zolmitriptan (ZOMIG-ZMT) 5 MG disintegrating tablet, Take 1/2-1 tablet po at onset of headache, may repeat dose in 2 hours if unrelieved.  Do not exceed more than 2 tablets in 24 hours. (Patient not taking: Reported on 8/10/2019), Disp: 9 Tab, Rfl: 5  ALLERGIES: No Known Allergies  SURGHX:   Past Surgical History:   Procedure Laterality Date   • HERNIA REPAIR       SOCHX:  reports that she has never smoked. She has never used smokeless tobacco. She reports that she drinks alcohol. She reports that she does not use drugs.  FH: Family history was reviewed, no pertinent findings to report     Objective:     /78 (BP Location: Right arm, Patient Position: Sitting, BP Cuff Size: Adult)   Pulse 84   Temp 37.1 °C (98.7 °F) (Temporal)   Resp 20   Ht 1.753 m (5' 9\")   Wt 81 kg (178 lb 9.6 oz)   SpO2 100%   BMI 26.37 kg/m²      Physical Exam   Constitutional: She is oriented to person, place, and time. She appears well-developed and well-nourished.   HENT:   Head: Normocephalic and atraumatic.   Right Ear: External ear and ear canal normal. A middle ear effusion is present.   Left Ear: External ear and ear canal normal. A middle ear effusion is present.   Nose: Mucosal edema and rhinorrhea present. Right sinus exhibits maxillary sinus tenderness and frontal sinus tenderness. Left sinus exhibits no maxillary sinus tenderness and no frontal sinus tenderness.   Mouth/Throat: Uvula is midline, oropharynx is clear and moist and mucous membranes are normal.   Eyes: Pupils are equal, round, and reactive to light. Conjunctivae and EOM are normal.   Neck: Normal range of motion.   Cardiovascular: Normal rate, regular rhythm and normal heart sounds.   No murmur heard.  Pulmonary/Chest: Effort normal and breath sounds normal. She has no wheezes.   Lymphadenopathy:     She has no cervical adenopathy.   Neurological: She is alert and oriented to person, place, and time. No cranial nerve deficit or sensory deficit. GCS eye " subscore is 4. GCS verbal subscore is 5. GCS motor subscore is 6.   Skin: Skin is warm and dry. Capillary refill takes less than 2 seconds.   Psychiatric: She has a normal mood and affect. Her behavior is normal. Judgment normal.   Vitals reviewed.         Assessment/Plan:     1. Other migraine without status migrainosus, not intractable    2. Dysfunction of both eustachian tubes  - methylPREDNISolone (MEDROL DOSEPAK) 4 MG Tablet Therapy Pack; Take as directed  Dispense: 1 Kit; Refill: 0    3. Acute pansinusitis, recurrence not specified  - methylPREDNISolone (MEDROL DOSEPAK) 4 MG Tablet Therapy Pack; Take as directed  Dispense: 1 Kit; Refill: 0  - amoxicillin-clavulanate (AUGMENTIN) 875-125 MG Tab; Take 1 Tab by mouth 2 times a day for 7 days.  Dispense: 14 Tab; Refill: 0    4. Antibiotic-induced yeast infection  - fluconazole (DIFLUCAN) 150 MG tablet; Take 1 Tab by mouth every day for 1 dose.  Dispense: 1 Tab; Refill: 0      *Believes that patient's migraine is being compounded by the fact that she has bilateral eustachian tube dysfunction and right-sided sinusitis.  We will start the patient on steroids and antibiotic.  Strict ER precautions discussed.        Differential Diagnosis, natural history, and supportive care discussed. Return to the Urgent Care or follow up with your PCP if symptoms fail to resolve, or for any new or worsening symptoms. Emergency room precautions discussed. Patient and/or family appears understanding of information.   n/a

## 2020-01-13 ENCOUNTER — HOSPITAL ENCOUNTER (OUTPATIENT)
Dept: LAB | Facility: MEDICAL CENTER | Age: 35
End: 2020-01-13
Attending: INTERNAL MEDICINE
Payer: COMMERCIAL

## 2020-01-13 LAB
25(OH)D3 SERPL-MCNC: 33 NG/ML (ref 30–100)
ALBUMIN SERPL BCP-MCNC: 4.1 G/DL (ref 3.2–4.9)
ALBUMIN/GLOB SERPL: 1.8 G/DL
ALP SERPL-CCNC: 41 U/L (ref 30–99)
ALT SERPL-CCNC: 21 U/L (ref 2–50)
ANION GAP SERPL CALC-SCNC: 10 MMOL/L (ref 0–11.9)
AST SERPL-CCNC: 18 U/L (ref 12–45)
BASOPHILS # BLD AUTO: 0.9 % (ref 0–1.8)
BASOPHILS # BLD: 0.07 K/UL (ref 0–0.12)
BILIRUB SERPL-MCNC: 0.7 MG/DL (ref 0.1–1.5)
BUN SERPL-MCNC: 21 MG/DL (ref 8–22)
CALCIUM SERPL-MCNC: 8.5 MG/DL (ref 8.5–10.5)
CHLORIDE SERPL-SCNC: 103 MMOL/L (ref 96–112)
CO2 SERPL-SCNC: 25 MMOL/L (ref 20–33)
CREAT SERPL-MCNC: 0.87 MG/DL (ref 0.5–1.4)
EOSINOPHIL # BLD AUTO: 0.15 K/UL (ref 0–0.51)
EOSINOPHIL NFR BLD: 2 % (ref 0–6.9)
ERYTHROCYTE [DISTWIDTH] IN BLOOD BY AUTOMATED COUNT: 40.9 FL (ref 35.9–50)
GLOBULIN SER CALC-MCNC: 2.3 G/DL (ref 1.9–3.5)
GLUCOSE SERPL-MCNC: 92 MG/DL (ref 65–99)
HCT VFR BLD AUTO: 42.6 % (ref 37–47)
HGB BLD-MCNC: 14.2 G/DL (ref 12–16)
IMM GRANULOCYTES # BLD AUTO: 0.02 K/UL (ref 0–0.11)
IMM GRANULOCYTES NFR BLD AUTO: 0.3 % (ref 0–0.9)
LYMPHOCYTES # BLD AUTO: 1.97 K/UL (ref 1–4.8)
LYMPHOCYTES NFR BLD: 26.5 % (ref 22–41)
MCH RBC QN AUTO: 30 PG (ref 27–33)
MCHC RBC AUTO-ENTMCNC: 33.3 G/DL (ref 33.6–35)
MCV RBC AUTO: 89.9 FL (ref 81.4–97.8)
MONOCYTES # BLD AUTO: 0.52 K/UL (ref 0–0.85)
MONOCYTES NFR BLD AUTO: 7 % (ref 0–13.4)
NEUTROPHILS # BLD AUTO: 4.71 K/UL (ref 2–7.15)
NEUTROPHILS NFR BLD: 63.3 % (ref 44–72)
NRBC # BLD AUTO: 0 K/UL
NRBC BLD-RTO: 0 /100 WBC
PLATELET # BLD AUTO: 299 K/UL (ref 164–446)
PMV BLD AUTO: 10.4 FL (ref 9–12.9)
POTASSIUM SERPL-SCNC: 4.1 MMOL/L (ref 3.6–5.5)
PROT SERPL-MCNC: 6.4 G/DL (ref 6–8.2)
RBC # BLD AUTO: 4.74 M/UL (ref 4.2–5.4)
SODIUM SERPL-SCNC: 138 MMOL/L (ref 135–145)
T4 FREE SERPL-MCNC: 1.04 NG/DL (ref 0.53–1.43)
TSH SERPL DL<=0.005 MIU/L-ACNC: 2.65 UIU/ML (ref 0.38–5.33)
VIT B12 SERPL-MCNC: 287 PG/ML (ref 211–911)
WBC # BLD AUTO: 7.4 K/UL (ref 4.8–10.8)

## 2020-01-13 PROCEDURE — 84439 ASSAY OF FREE THYROXINE: CPT

## 2020-01-13 PROCEDURE — 83036 HEMOGLOBIN GLYCOSYLATED A1C: CPT

## 2020-01-13 PROCEDURE — 80053 COMPREHEN METABOLIC PANEL: CPT

## 2020-01-13 PROCEDURE — 82607 VITAMIN B-12: CPT

## 2020-01-13 PROCEDURE — 82306 VITAMIN D 25 HYDROXY: CPT

## 2020-01-13 PROCEDURE — 85025 COMPLETE CBC W/AUTO DIFF WBC: CPT

## 2020-01-13 PROCEDURE — 36415 COLL VENOUS BLD VENIPUNCTURE: CPT

## 2020-01-13 PROCEDURE — 84443 ASSAY THYROID STIM HORMONE: CPT

## 2020-01-16 LAB
EST. AVERAGE GLUCOSE BLD GHB EST-MCNC: 103 MG/DL
HBA1C MFR BLD: 5.2 % (ref 0–5.6)

## 2020-03-03 ENCOUNTER — OFFICE VISIT (OUTPATIENT)
Dept: NEUROLOGY | Facility: MEDICAL CENTER | Age: 35
End: 2020-03-03
Payer: COMMERCIAL

## 2020-03-03 ENCOUNTER — TELEPHONE (OUTPATIENT)
Dept: NEUROLOGY | Facility: MEDICAL CENTER | Age: 35
End: 2020-03-03

## 2020-03-03 VITALS
SYSTOLIC BLOOD PRESSURE: 120 MMHG | HEART RATE: 67 BPM | TEMPERATURE: 98.6 F | DIASTOLIC BLOOD PRESSURE: 72 MMHG | OXYGEN SATURATION: 98 % | RESPIRATION RATE: 16 BRPM | WEIGHT: 179.45 LBS | BODY MASS INDEX: 26.58 KG/M2 | HEIGHT: 69 IN

## 2020-03-03 PROCEDURE — 64615 CHEMODENERV MUSC MIGRAINE: CPT | Performed by: NURSE PRACTITIONER

## 2020-03-03 RX ORDER — LACOSAMIDE 200 MG/1
200 TABLET, FILM COATED ORAL 2 TIMES DAILY
COMMUNITY
Start: 2020-02-09 | End: 2020-03-30

## 2020-03-03 RX ORDER — KETOROLAC TROMETHAMINE 30 MG/ML
60 INJECTION, SOLUTION INTRAMUSCULAR; INTRAVENOUS ONCE
Status: COMPLETED | OUTPATIENT
Start: 2020-03-03 | End: 2020-03-03

## 2020-03-03 RX ADMIN — KETOROLAC TROMETHAMINE 60 MG: 30 INJECTION, SOLUTION INTRAMUSCULAR; INTRAVENOUS at 07:40

## 2020-03-03 ASSESSMENT — FIBROSIS 4 INDEX: FIB4 SCORE: 0.46

## 2020-03-03 ASSESSMENT — PATIENT HEALTH QUESTIONNAIRE - PHQ9: CLINICAL INTERPRETATION OF PHQ2 SCORE: 0

## 2020-03-03 NOTE — PROGRESS NOTES
Subjective:      Roxanna Guzmán is a 34 y.o. female who presents with Botox Injection (Chronic migraine)            HPI  Here for Botox today.      She notices great relief of tension in the neck and shoulders when Botox is active.    Current Outpatient Medications   Medication Sig Dispense Refill   • VIMPAT 200 MG Tab tablet Take 200 mg by mouth 2 Times a Day. FOR 30 DAYS     • sertraline (ZOLOFT) 100 MG Tab Take 100 mg by mouth every day.  2   • fluticasone (FLONASE) 50 MCG/ACT nasal spray Spray 1 Spray in nose every day. 16 g 0   • ketorolac (TORADOL) 30 MG/ML Solution 15 mg by Intravenous route every 6 hours as needed.     • Fe Bisgly-Fe Polysac-B12-Zinc (MAXFE PO) Take 10 mg by mouth two times a day may change times on alt/days.     • Pseudoephedrine-guaiFENesin (MUCINEX D PO) Take  by mouth.     • Pseudoephedrine HCl (SUDAFED PO) Take  by mouth.     • ALPRAZolam (XANAX) 1 MG Tab TAKE 1 TABLET (1 MG) BY MOUTH DAILY AS NEEDED FOR ANXIETY  2   • rizatriptan (MAXALT-MLT) 10 MG disintegrating tablet TAKE 1/2 TO 1 TAB BY MOUTH AT ONSET OF HEADACHE MAY REPEAT DOSE IN 2 HRS IF UNRELIVED MAY 2/24HR.  10-Day supply 18 Tab 11   • NON SPECIFIED Ketoralac 30mg tablets  Take one table po Q6 hour prn migraine pain.  Do not exceed more than 2 tablets in 24 hours. 10 Each 0   • NON SPECIFIED Ajovy injection 225mg/1.5ml   Use 225mg SQ one time monthly. 1 Each 5   • etonogestrel (NEXPLANON) 68 MG Implant implant Inject 1 Each as instructed Once.       No current facility-administered medications for this visit.          ROS       Objective:     There were no vitals taken for this visit.     Physical Exam            Assessment/Plan:     Chronic Migraine:  Botox therapy has reduced patient’s migraines by more than 7 days and/or 100 hours per month.     I treated this patient in clinic today with BotoxA 155 units according to the dosing/injection paradigm currently mandated by the FDA for the management of chronic migraine.  Specifically, I injected 5 units to the procerus, 5 units to the corrugators bilaterally, a total of 20 units to the frontalis musculature, 20 units to the temporalis bilaterally, 15 units to the occipitalis bilaterally, 10 units to the cervical paraspinals bilaterally and 15 units to the trapezius musculature bilaterally. The remainder of the Botox 200 units mixed but not administered was discarded as wastage per FDA guidelines.     Education/counseling/reduction in medications if pt has medication overuse headache; Frequency of headaches is >15 days monthly with at least 8 migraines monthly; Migraines include at least two of the following: worsened with activity or avoidance of activity with migraines (ie they go lie down), moderate to severe pain intensity, pulsing headache, unilateral headache AND they have either nausea or vomiting OR sensitivity to light and sound.     Although this patient is responding to botox they are NOT migraine free, however, and it is my recommendation Botox be continued at this time.     This patient has chronic daily migraines, defined as having 15 or more headaches days per month, 8 of which are migraines, over a minimum of the last three months. Episodes last more than 4 hours (untreated).  Pt has 2 or more of following (see initial note) -  Headache worsened with activity, pain is moderate to severe intensity, pulsing in nature, unilateral and patient either has nausea/vomiting OR sensitivity to light and sound.  This patient does/does not also have medication overuse headache.  However our plan to address this includes education, occipital nerve shots, restricting use as directed.     Toradol 60mg IM provided per MA under direct physician supervision.     Will transition from Ajovy to Emgality.  Hopeful to have even fewer migraines with this change.     Return for follow-up in 3 months for serial set of Botox and evaluation of need for Botox.

## 2020-04-17 DIAGNOSIS — G43.709 CHRONIC MIGRAINE W/O AURA W/O STATUS MIGRAINOSUS, NOT INTRACTABLE: ICD-10-CM

## 2020-05-26 ENCOUNTER — APPOINTMENT (OUTPATIENT)
Dept: NEUROLOGY | Facility: MEDICAL CENTER | Age: 35
End: 2020-05-26
Payer: COMMERCIAL

## 2020-08-31 ENCOUNTER — HOSPITAL ENCOUNTER (OUTPATIENT)
Dept: LAB | Facility: MEDICAL CENTER | Age: 35
End: 2020-08-31
Attending: INTERNAL MEDICINE
Payer: COMMERCIAL

## 2020-08-31 ENCOUNTER — NURSE TRIAGE (OUTPATIENT)
Dept: HEALTH INFORMATION MANAGEMENT | Facility: OTHER | Age: 35
End: 2020-08-31

## 2020-08-31 LAB
COVID ORDER STATUS COVID19: NORMAL
SARS-COV-2 RNA RESP QL NAA+PROBE: NOTDETECTED
SPECIMEN SOURCE: NORMAL

## 2020-08-31 PROCEDURE — U0003 INFECTIOUS AGENT DETECTION BY NUCLEIC ACID (DNA OR RNA); SEVERE ACUTE RESPIRATORY SYNDROME CORONAVIRUS 2 (SARS-COV-2) (CORONAVIRUS DISEASE [COVID-19]), AMPLIFIED PROBE TECHNIQUE, MAKING USE OF HIGH THROUGHPUT TECHNOLOGIES AS DESCRIBED BY CMS-2020-01-R: HCPCS

## 2020-08-31 PROCEDURE — C9803 HOPD COVID-19 SPEC COLLECT: HCPCS

## 2020-08-31 NOTE — TELEPHONE ENCOUNTER
1. Caller Name:Roxanna Guzmán                 Call Back Number: [phone]  [facility] PCP or Specialty Provider: No          2.  In the last two weeks, has the patient had any new or worsening symptoms (not explained by alternative diagnosis)? Yes, the patient reports the following COVID-19 consistent symptoms: sore throat, congestion or runny nose, muscle pain or body aches and fatigue, since yesterday.    3.  Does patient have any comoribidities? None     4.  Has the patient traveled in the last 14 days OR had any known contact with someone who is suspected or confirmed to have COVID-19?  Yes, traveled to [place] over the weekend. No exposure.     5. POTENTIAL PUI (LOW): Advised to perform self care, monitor for worsening symptoms and to call back in 3 days if no improvement. Instructed to self isolate and contact [facility] at [phone]         Note routed to [facility] Provider: CHANDRAKANT only.

## 2020-09-01 ENCOUNTER — APPOINTMENT (OUTPATIENT)
Dept: NEUROLOGY | Facility: MEDICAL CENTER | Age: 35
End: 2020-09-01
Payer: COMMERCIAL

## 2020-09-15 ENCOUNTER — OFFICE VISIT (OUTPATIENT)
Dept: NEUROLOGY | Facility: MEDICAL CENTER | Age: 35
End: 2020-09-15
Payer: COMMERCIAL

## 2020-09-15 VITALS
BODY MASS INDEX: 24.29 KG/M2 | HEIGHT: 69 IN | OXYGEN SATURATION: 97 % | SYSTOLIC BLOOD PRESSURE: 116 MMHG | WEIGHT: 164.02 LBS | HEART RATE: 80 BPM | DIASTOLIC BLOOD PRESSURE: 82 MMHG | TEMPERATURE: 97.5 F

## 2020-09-15 PROCEDURE — 64615 CHEMODENERV MUSC MIGRAINE: CPT | Performed by: NURSE PRACTITIONER

## 2020-09-15 RX ORDER — KETOROLAC TROMETHAMINE 30 MG/ML
60 INJECTION, SOLUTION INTRAMUSCULAR; INTRAVENOUS ONCE
Status: COMPLETED | OUTPATIENT
Start: 2020-09-15 | End: 2020-09-15

## 2020-09-15 RX ORDER — ZOLPIDEM TARTRATE 5 MG/1
10 TABLET ORAL NIGHTLY PRN
COMMUNITY
End: 2020-12-23

## 2020-09-15 RX ORDER — GALCANEZUMAB 120 MG/ML
120 INJECTION, SOLUTION SUBCUTANEOUS
Qty: 1 ML | Refills: 5 | Status: SHIPPED | OUTPATIENT
Start: 2020-09-15 | End: 2021-06-01

## 2020-09-15 RX ORDER — CLONAZEPAM 1 MG/1
0.5 TABLET ORAL 2 TIMES DAILY
COMMUNITY
End: 2020-12-23

## 2020-09-15 RX ORDER — RIZATRIPTAN BENZOATE 10 MG/1
TABLET, ORALLY DISINTEGRATING ORAL
Qty: 9 TAB | Refills: 5 | Status: SHIPPED | OUTPATIENT
Start: 2020-09-15 | End: 2021-11-30 | Stop reason: SDUPTHER

## 2020-09-15 RX ADMIN — KETOROLAC TROMETHAMINE 60 MG: 30 INJECTION, SOLUTION INTRAMUSCULAR; INTRAVENOUS at 08:37

## 2020-09-15 ASSESSMENT — FIBROSIS 4 INDEX: FIB4 SCORE: 0.47

## 2020-09-15 NOTE — PROGRESS NOTES
"Subjective:      Roxanna Guzmán is a 35 y.o. female who presents with Botox Injection (migraines)            HPI  Here for Botox today.    She notices great relief of tension in the neck and shoulders when Botox is active.      Current Outpatient Medications   Medication Sig Dispense Refill   • clonazePAM (KLONOPIN) 1 MG Tab Take 0.5 mg by mouth 2 times a day. Ptrtakes at night     • zolpidem (AMBIEN) 5 MG Tab Take 10 mg by mouth at bedtime as needed for Sleep.     • Galcanezumab-gnlm (EMGALITY) 120 MG/ML Solution Auto-injector Inject 120 mg as instructed Q30 DAYS. 1 mL 5   • rizatriptan (MAXALT-MLT) 10 MG disintegrating tablet Take 1/2-1 tablet po at onset of headache, may repeat dose in 2 hours if unrelieved.  Do not exceed more than 2 tablets in 24 hours. 9 Tab 5   • VIMPAT 200 MG Tab tablet TAKE 1 TABLET BY MOUTH TWICE A DAY FOR 30 DAYS *G40.909 60 Tab 3   • fluticasone (FLONASE) 50 MCG/ACT nasal spray Spray 1 Spray in nose every day. 16 g 0   • ketorolac (TORADOL) 30 MG/ML Solution 15 mg by Intravenous route every 6 hours as needed.     • Pseudoephedrine-guaiFENesin (MUCINEX D PO) Take  by mouth.     • Pseudoephedrine HCl (SUDAFED PO) Take  by mouth.     • ALPRAZolam (XANAX) 1 MG Tab TAKE 1 TABLET (1 MG) BY MOUTH DAILY AS NEEDED FOR ANXIETY  2   • NON SPECIFIED Ajovy injection 225mg/1.5ml   Use 225mg SQ one time monthly. 1 Each 5     Current Facility-Administered Medications   Medication Dose Route Frequency Provider Last Rate Last Dose   • onabotulinum toxin type A SOLR 155 Units  155 Units Injection Once Lynn EDGAR Kumar, A.P.N.       • ketorolac (TORADOL) injection 60 mg  60 mg Intramuscular Once Lynn E Kumar, A.P.N.             ROS       Objective:     /82 (BP Location: Left arm, Patient Position: Sitting, BP Cuff Size: Adult)   Pulse 80   Temp 36.4 °C (97.5 °F)   Ht 1.753 m (5' 9\")   Wt 74.4 kg (164 lb 0.4 oz)   SpO2 97%   BMI 24.22 kg/m²      Physical Exam            Assessment/Plan:      "     Chronic Migraine:  Botox therapy has reduced patient’s migraines by more than 7 days and/or 100 hours per month.     I treated this patient in clinic today with BotoxA 155 units according to the dosing/injection paradigm currently mandated by the FDA for the management of chronic migraine. Specifically, I injected 5 units to the procerus, 5 units to the corrugators bilaterally, a total of 20 units to the frontalis musculature, 20 units to the temporalis bilaterally, 15 units to the occipitalis bilaterally, 10 units to the cervical paraspinals bilaterally and 15 units to the trapezius musculature bilaterally. The remainder of the Botox 200 units mixed but not administered was discarded as wastage per FDA guidelines.     Education/counseling/reduction in medications if pt has medication overuse headache; Frequency of headaches is >15 days monthly with at least 8 migraines monthly; Migraines include at least two of the following: worsened with activity or avoidance of activity with migraines (ie they go lie down), moderate to severe pain intensity, pulsing headache, unilateral headache AND they have either nausea or vomiting OR sensitivity to light and sound.     Although this patient is responding to botox they are NOT migraine free, however, and it is my recommendation Botox be continued at this time.     This patient has chronic daily migraines, defined as having 15 or more headaches days per month, 8 of which are migraines, over a minimum of the last three months. Episodes last more than 4 hours (untreated).  Pt has 2 or more of following (see initial note) -  Headache worsened with activity, pain is moderate to severe intensity, pulsing in nature, unilateral and patient either has nausea/vomiting OR sensitivity to light and sound.  This patient does/does not also have medication overuse headache.  However our plan to address this includes education, occipital nerve shots, restricting use as  directed.     Toradol 60mg IM provided per MA under direct physician supervision.     First injection set done about one month ago.  Will continue Emgality injections.    New Rx for Maxalt provided.     Return for follow-up in 3 months for serial set of Botox and evaluation of need for Botox.

## 2020-11-24 ENCOUNTER — TELEPHONE (OUTPATIENT)
Dept: MEDICAL GROUP | Facility: MEDICAL CENTER | Age: 35
End: 2020-11-24

## 2020-11-24 NOTE — TELEPHONE ENCOUNTER
Left a message for patient to call back at [phone] to establish with Chapis Alexis.     Eva Sierra  [facility] Assistant

## 2020-12-08 ENCOUNTER — OFFICE VISIT (OUTPATIENT)
Dept: NEUROLOGY | Facility: MEDICAL CENTER | Age: 35
End: 2020-12-08
Payer: COMMERCIAL

## 2020-12-08 VITALS
BODY MASS INDEX: 24.29 KG/M2 | HEIGHT: 69 IN | OXYGEN SATURATION: 98 % | SYSTOLIC BLOOD PRESSURE: 110 MMHG | RESPIRATION RATE: 16 BRPM | WEIGHT: 164.02 LBS | TEMPERATURE: 98.6 F | HEART RATE: 75 BPM | DIASTOLIC BLOOD PRESSURE: 70 MMHG

## 2020-12-08 PROCEDURE — 64615 CHEMODENERV MUSC MIGRAINE: CPT | Performed by: NURSE PRACTITIONER

## 2020-12-08 PROCEDURE — 99999 PR NO CHARGE: CPT | Performed by: NURSE PRACTITIONER

## 2020-12-08 RX ORDER — KETOROLAC TROMETHAMINE 30 MG/ML
60 INJECTION, SOLUTION INTRAMUSCULAR; INTRAVENOUS ONCE
Status: COMPLETED | OUTPATIENT
Start: 2020-12-08 | End: 2020-12-08

## 2020-12-08 RX ADMIN — KETOROLAC TROMETHAMINE 60 MG: 30 INJECTION, SOLUTION INTRAMUSCULAR; INTRAVENOUS at 07:56

## 2020-12-08 ASSESSMENT — FIBROSIS 4 INDEX: FIB4 SCORE: 0.47

## 2020-12-08 NOTE — PROGRESS NOTES
"Subjective:      Roxanna Guzmán is a 35 y.o. female who presents with Botox Injection (Chronic migraine)            HPI  Here for Botox today.     She notices great relief of tension in the neck and shoulders when Botox is active.  Brow feels a bit heavy.    Current Outpatient Medications   Medication Sig Dispense Refill   • VIMPAT 200 MG Tab tablet TAKE 1 TABLET BY MOUTH TWICE A DAY FOR 30 DAYS. G40.909, G43.709 60 Tab 5   • clonazePAM (KLONOPIN) 1 MG Tab Take 0.5 mg by mouth 2 times a day. Ptrtakes at night     • zolpidem (AMBIEN) 5 MG Tab Take 10 mg by mouth at bedtime as needed for Sleep.     • Galcanezumab-gnlm (EMGALITY) 120 MG/ML Solution Auto-injector Inject 120 mg as instructed Q30 DAYS. 1 mL 5   • rizatriptan (MAXALT-MLT) 10 MG disintegrating tablet Take 1/2-1 tablet po at onset of headache, may repeat dose in 2 hours if unrelieved.  Do not exceed more than 2 tablets in 24 hours. 9 Tab 5   • fluticasone (FLONASE) 50 MCG/ACT nasal spray Spray 1 Spray in nose every day. 16 g 0   • ketorolac (TORADOL) 30 MG/ML Solution 15 mg by Intravenous route every 6 hours as needed.     • Pseudoephedrine-guaiFENesin (MUCINEX D PO) Take  by mouth.     • Pseudoephedrine HCl (SUDAFED PO) Take  by mouth.     • ALPRAZolam (XANAX) 1 MG Tab TAKE 1 TABLET (1 MG) BY MOUTH DAILY AS NEEDED FOR ANXIETY  2   • NON SPECIFIED Ajovy injection 225mg/1.5ml   Use 225mg SQ one time monthly. 1 Each 5     Current Facility-Administered Medications   Medication Dose Route Frequency Provider Last Rate Last Admin   • ketorolac (TORADOL) injection 60 mg  60 mg Intramuscular Once Lynn Kumar, A.P.N.       • onabotulinum toxin type A SOLR 155 Units  155 Units Injection Once Lynn Kumar, A.P.N.           ROS       Objective:     /70 (BP Location: Left arm, Patient Position: Sitting, BP Cuff Size: Adult)   Pulse 75   Temp 37 °C (98.6 °F) (Temporal)   Resp 16   Ht 1.753 m (5' 9.02\")   Wt 74.4 kg (164 lb 0.4 oz)   SpO2 98%   BMI " 24.21 kg/m²      Physical Exam            Assessment/Plan:        Chronic Migraine:  Botox therapy has reduced patient’s migraines by more than 7 days and/or 100 hours per month.     I treated this patient in clinic today with BotoxA 155 units according to the dosing/injection paradigm currently mandated by the FDA for the management of chronic migraine. Specifically, I injected 5 units to the procerus, 5 units to the corrugators bilaterally, a total of 20 units to the frontalis musculature, 20 units to the temporalis bilaterally, 15 units to the occipitalis bilaterally, 10 units to the cervical paraspinals bilaterally and 15 units to the trapezius musculature bilaterally. The remainder of the Botox 200 units mixed but not administered was discarded as wastage per FDA guidelines.     Education/counseling/reduction in medications if pt has medication overuse headache; Frequency of headaches is >15 days monthly with at least 8 migraines monthly; Migraines include at least two of the following: worsened with activity or avoidance of activity with migraines (ie they go lie down), moderate to severe pain intensity, pulsing headache, unilateral headache AND they have either nausea or vomiting OR sensitivity to light and sound.     Although this patient is responding to botox they are NOT migraine free, however, and it is my recommendation Botox be continued at this time.     This patient has chronic daily migraines, defined as having 15 or more headaches days per month, 8 of which are migraines, over a minimum of the last three months. Episodes last more than 4 hours (untreated).  Pt has 2 or more of following (see initial note) -  Headache worsened with activity, pain is moderate to severe intensity, pulsing in nature, unilateral and patient either has nausea/vomiting OR sensitivity to light and sound.  This patient does/does not also have medication overuse headache.  However our plan to address this includes education,  occipital nerve shots, restricting use as directed.     Toradol 60mg IM provided per MA under direct physician supervision.     Will continue Emgality injections.     Return for follow-up in 3 months for serial set of Botox and evaluation of need for Botox.

## 2020-12-14 ENCOUNTER — APPOINTMENT (OUTPATIENT)
Dept: MEDICAL GROUP | Facility: MEDICAL CENTER | Age: 35
End: 2020-12-14
Payer: COMMERCIAL

## 2020-12-23 ENCOUNTER — OFFICE VISIT (OUTPATIENT)
Dept: MEDICAL GROUP | Facility: MEDICAL CENTER | Age: 35
End: 2020-12-23
Payer: COMMERCIAL

## 2020-12-23 VITALS
HEART RATE: 71 BPM | BODY MASS INDEX: 23.4 KG/M2 | WEIGHT: 158 LBS | DIASTOLIC BLOOD PRESSURE: 74 MMHG | HEIGHT: 69 IN | TEMPERATURE: 98.4 F | SYSTOLIC BLOOD PRESSURE: 114 MMHG | OXYGEN SATURATION: 100 %

## 2020-12-23 DIAGNOSIS — G40.909 SEIZURE DISORDER (HCC): ICD-10-CM

## 2020-12-23 DIAGNOSIS — F41.9 ANXIETY: ICD-10-CM

## 2020-12-23 DIAGNOSIS — F51.04 PSYCHOPHYSIOLOGICAL INSOMNIA: ICD-10-CM

## 2020-12-23 DIAGNOSIS — Z98.890 HX OF TYMPANOSTOMY TUBES: ICD-10-CM

## 2020-12-23 PROBLEM — H92.01 OTALGIA OF RIGHT EAR: Status: ACTIVE | Noted: 2018-02-27

## 2020-12-23 PROBLEM — H90.0 CONDUCTIVE HEARING LOSS, BILATERAL: Status: ACTIVE | Noted: 2018-02-05

## 2020-12-23 PROBLEM — H90.0 CONDUCTIVE HEARING LOSS, BILATERAL: Status: RESOLVED | Noted: 2018-02-05 | Resolved: 2020-12-23

## 2020-12-23 PROBLEM — H69.93 DYSFUNCTION OF BOTH EUSTACHIAN TUBES: Status: ACTIVE | Noted: 2018-02-05

## 2020-12-23 PROBLEM — J30.1 ALLERGIC RHINITIS DUE TO POLLEN: Status: ACTIVE | Noted: 2020-12-23

## 2020-12-23 PROBLEM — J32.4 CHRONIC PANSINUSITIS: Status: ACTIVE | Noted: 2020-12-23

## 2020-12-23 PROCEDURE — 99214 OFFICE O/P EST MOD 30 MIN: CPT | Performed by: NURSE PRACTITIONER

## 2020-12-23 RX ORDER — LACOSAMIDE 200 MG/1
200 TABLET ORAL 2 TIMES DAILY
COMMUNITY
Start: 2020-08-16 | End: 2021-06-30

## 2020-12-23 RX ORDER — PREDNISOLONE SODIUM PHOSPHATE 10 MG/ML
SOLUTION/ DROPS OPHTHALMIC
Status: ON HOLD | COMMUNITY
Start: 2020-11-02 | End: 2021-02-17

## 2020-12-23 RX ORDER — AZELASTINE 1 MG/ML
SPRAY, METERED NASAL
COMMUNITY
Start: 2020-11-02 | End: 2021-01-29

## 2020-12-23 RX ORDER — ZOLPIDEM TARTRATE 10 MG/1
10 TABLET ORAL NIGHTLY PRN
COMMUNITY
End: 2021-02-22

## 2020-12-23 RX ORDER — CLONAZEPAM 1 MG/1
1 TABLET ORAL
COMMUNITY
Start: 2020-08-13 | End: 2021-02-22

## 2020-12-23 ASSESSMENT — FIBROSIS 4 INDEX: FIB4 SCORE: 0.47

## 2020-12-23 NOTE — ASSESSMENT & PLAN NOTE
Chronic. Has constant issues with ear pressure which wouldn't equalize. She is seeing Dr. Berrios, with [facility]. Placed tym tubes 2 years ago which significantly improved symptoms.

## 2020-12-23 NOTE — ASSESSMENT & PLAN NOTE
Chronic. New to me today. Ongoing for about 10 years. Is getting better with therapy. Previously on lexapro for this, now only using alprazolam as needed. Using around once every 2 weeks. Will occasionally take alprazolam for sleep.

## 2020-12-23 NOTE — ASSESSMENT & PLAN NOTE
Chronic. New to me today. Currently using boxot inj every 3 months. For breakthrough migraines (3-5 per month), using rizatriptan as needed for this, occasionally using Ketorlac home inj for this. No aura. Severe pain, photophobia, nausea. Getting Emgality injections 120 mg monthly as well.     Plans to work with Neuro to stop some of these medications prior to conceiving.     Worse with stress.

## 2020-12-23 NOTE — LETTER
[facility]  SONIA Paredes  [address]  [facility] [address]  Fax: [phone]   Authorization for Release/Disclosure of   Protected Health Information   Name: MURIEL AZAR : 1985 SSN: [id number]   Address: [address] [place] [id number] Phone:    [phone] (home)    I authorize the entity listed below to release/disclose the PHI below to:   [facility]/SONIA Paredes and SONIA Paredes   Provider or Entity Name:  Dr. Carlos Webb Office   Address   [facility]  [address] [facility] [address] [address]  [place] [address] Phone:  [phone]    Fax:[phone]   Reason for request: continuity of care   Information to be released:    [  ] LAST COLONOSCOPY,  including any PATH REPORT and follow-up  [  ] LAST FIT/COLOGUARD RESULT [  ] LAST DEXA  [  ] LAST MAMMOGRAM  [  ] LAST PAP  [  ] LAST LABS [  ] RETINA EXAM REPORT  [  ] IMMUNIZATION RECORDS  [ XXXX ] Release all info      [  ] Check here and initial the line next to each item to release ALL health information INCLUDING  _____ Care and treatment for drug and / or alcohol abuse  _____ HIV testing, infection status, or AIDS  _____ Genetic Testing    DATES OF SERVICE OR TIME PERIOD TO BE DISCLOSED: _____________  I understand and acknowledge that:  * This Authorization may be revoked at any time by you in writing, except if your health information has already been used or disclosed.  * Your health information that will be used or disclosed as a result of you signing this authorization could be re-disclosed by the recipient. If this occurs, your re-disclosed health information may no longer be protected by State or Federal laws.  * You may refuse to sign this Authorization. Your refusal will not affect your ability to obtain treatment.  * This Authorization becomes effective upon signing and will  on (date) __________.      If no date is indicated, this Authorization will  one (1) year from the  signature date.    Name: Roxanna Ramirez    Signature: continuity of care   Date:     12/23/2020       PLEASE FAX REQUESTED RECORDS BACK TO: [phone]

## 2020-12-23 NOTE — PROGRESS NOTES
Roxanna Guzmán is a 35 y.o. female here to establish care:    HPI:   Seizure disorder (HCC)  Chronic. New to me today. Diagnosed 2014 by a psychiatrist, thought that she was having panic attacks but found to be having focal seizures. Possibly stress induced. Currently seeing Christine Kumar, neurology, currently on Vimpat 200 mg BID which is working well. Hasn't had a seizure in about 1.5 years.     Chronic migraine  Chronic. New to me today. Currently using boxot inj every 3 months. For breakthrough migraines (3-5 per month), using rizatriptan as needed for this, occasionally using Ketorlac home inj for this. No aura. Severe pain, photophobia, nausea. Getting Emgality injections 120 mg monthly as well.     Plans to work with Neuro to stop some of these medications prior to conceiving.     Worse with stress.     Anxiety  Chronic. New to me today. Ongoing for about 10 years. Is getting better with therapy. Previously on lexapro for this, now only using alprazolam as needed. Using around once every 2 weeks. Will occasionally take alprazolam for sleep.     Psychophysiological insomnia  Chornic. New to me today. Ongoing for about 10 years. Currently taking ambien 10 mg and clonazepam 1mg nightly for this. If she is still unable to sleep when she is having worsening stress and anxiety, she will take an additional alprazolam 1 mg with this.     Currently seeing psychiatry for this, Christal Gage, who prescribes her medications.     Hx of tympanostomy tubes  Chronic. Has constant issues with ear pressure which wouldn't equalize. She is seeing Dr. Berrios, with [facility]. Placed tym tubes 2 years ago which significantly improved symptoms.     Current medicines (including changes today)  Current Outpatient Medications   Medication Sig Dispense Refill   • lacosamide (VIMPAT) 200 MG Tab tablet TAKE 1 TABLET BY MOUTH TWICE A DAY FOR 30 DAYS. G40.909, G43.709     • azelastine (ASTELIN) 137 MCG/SPRAY nasal spray 2 sprays in each  nostril     • prednisoLONE sodium phosphate (INFLAMASE FORTE) 1 % Solution 4-6 drops into affected ear     • clonazePAM (KLONOPIN) 1 MG Tab TAKE 1 TABLET BY MOUTH AT BEDTIME AS NEEDED FOR INSOMNIA FOR 30 DAYS. F33.1     • Galcanezumab-gnlm (EMGALITY) 120 MG/ML Solution Auto-injector Inject 120 mg as instructed Q30 DAYS. 1 mL 5   • rizatriptan (MAXALT-MLT) 10 MG disintegrating tablet Take 1/2-1 tablet po at onset of headache, may repeat dose in 2 hours if unrelieved.  Do not exceed more than 2 tablets in 24 hours. 9 Tab 5   • fluticasone (FLONASE) 50 MCG/ACT nasal spray Spray 1 Spray in nose every day. 16 g 0   • ketorolac (TORADOL) 30 MG/ML Solution 15 mg by Intravenous route every 6 hours as needed.     • Pseudoephedrine-guaiFENesin (MUCINEX D PO) Take  by mouth.     • Pseudoephedrine HCl (SUDAFED PO) Take  by mouth.     • ALPRAZolam (XANAX) 1 MG Tab 0.5 mg.  2   • zolpidem (AMBIEN) 10 MG Tab zolpidem 10 mg tablet   Take 1 tablet every day by oral route at bedtime for 30 days.       No current facility-administered medications for this visit.      She  has a past medical history of Anxiety, Depression, Head ache, Migraine, Seizure disorder, complex partial (HCC), and TMJ arthropathy.  She  has a past surgical history that includes hernia repair; blepharoplasty (Bilateral, 2019); leep (2003); and lipoma excision.  Social History     Tobacco Use   • Smoking status: Never Smoker   • Smokeless tobacco: Never Used   Substance Use Topics   • Alcohol use: Yes     Comment: soc   • Drug use: No     Social History     Social History Narrative   • Not on file     Family History   Problem Relation Age of Onset   • Hypertension Mother    • Cancer Father         prostate cancer   • Depression Father    • Genetic Disorder Father         Unknown Neuromusk disease   • Hypertension Maternal Grandmother    • Hyperlipidemia Maternal Grandmother    • Genetic Disorder Brother         Unknown Neuromusk disease   • Genetic Disorder  "Paternal Aunt         Unknown Neuromusk disease   • Genetic Disorder Paternal Grandmother         Unknown Neuromusk disease     Family Status   Relation Name Status   • Mo  Alive   • Fa  Alive   • MGMo     • Bro  Alive   • PAunt  (Not Specified)   • MGFa     • PGMo     • PGFa           ROS  No chest pain, no abdominal pain, no rash.  Positive ROS as per HPI.  All other systems reviewed and are negative      Objective:     /74 (BP Location: Right arm, Patient Position: Sitting, BP Cuff Size: Adult)   Pulse 71   Temp 36.9 °C (98.4 °F) (Temporal)   Ht 1.753 m (5' 9\")   Wt 71.7 kg (158 lb)   SpO2 100%  Body mass index is 23.33 kg/m².     Physical Exam:    Constitutional: Alert, no distress.  Skin: Warm, dry, good turgor, no rashes in visible areas.  Eye: Equal, round and reactive, conjunctiva clear, lids normal.  ENMT:   Respiratory: Unlabored respiratory effort  Psych: Alert and oriented x3, normal affect and mood.      Assessment and Plan:   The following treatment plan was discussed    1. Seizure disorder (HCC)  Stable  Continue Vimpat 200 mg BID and follow up per neurology    2. Chronic migraine  Stable  Continue Emgality monthly, botox injections every 3 months, rizatriptan and toradol as needed.   Continue follow up with Neuro to discuss medication adjustment prior to pregnancy    3. Anxiety  Stable  Continue follow up with therapy and psychiatry    4. Psychophysiological insomnia  Stable  Continue medications and follow up per psychiatry    5. Hx of tympanostomy tubes  Stable  Continue follow up with ENT      Records requested-Dr. Pagan  Followup: Return in about 1 year (around 2021), or if symptoms worsen or fail to improve.    I have placed the below orders and discussed them with an approved delegating provider. The MA is performing the below orders under the direction of Dr. Garner           "

## 2020-12-23 NOTE — ASSESSMENT & PLAN NOTE
Chronic. New to me today. Diagnosed 2014 by a psychiatrist, thought that she was having panic attacks but found to be having focal seizures. Possibly stress induced. Currently seeing Christine Kumar, neurology, currently on Vimpat 200 mg BID which is working well. Hasn't had a seizure in about 1.5 years.

## 2021-01-28 ENCOUNTER — OFFICE VISIT (OUTPATIENT)
Dept: PHYSICAL MEDICINE AND REHAB | Facility: MEDICAL CENTER | Age: 36
End: 2021-01-28
Payer: COMMERCIAL

## 2021-01-28 VITALS
SYSTOLIC BLOOD PRESSURE: 118 MMHG | BODY MASS INDEX: 22.92 KG/M2 | DIASTOLIC BLOOD PRESSURE: 62 MMHG | OXYGEN SATURATION: 97 % | HEIGHT: 69 IN | WEIGHT: 154.76 LBS | TEMPERATURE: 98.6 F | HEART RATE: 89 BPM

## 2021-01-28 DIAGNOSIS — R29.2 HYPERREFLEXIA: ICD-10-CM

## 2021-01-28 DIAGNOSIS — Z78.9 IMPAIRED MOBILITY AND ADLS: ICD-10-CM

## 2021-01-28 DIAGNOSIS — R29.2 HYPOREFLEXIA: ICD-10-CM

## 2021-01-28 DIAGNOSIS — R29.2: ICD-10-CM

## 2021-01-28 DIAGNOSIS — M54.16 LUMBAR RADICULOPATHY: ICD-10-CM

## 2021-01-28 DIAGNOSIS — R29.898 WEAKNESS OF LEFT LOWER EXTREMITY: ICD-10-CM

## 2021-01-28 DIAGNOSIS — Z74.09 IMPAIRED MOBILITY AND ADLS: ICD-10-CM

## 2021-01-28 DIAGNOSIS — R20.0 LEFT LEG NUMBNESS: ICD-10-CM

## 2021-01-28 PROCEDURE — 99205 OFFICE O/P NEW HI 60 MIN: CPT | Performed by: PHYSICAL MEDICINE & REHABILITATION

## 2021-01-28 RX ORDER — TIZANIDINE 4 MG/1
8 TABLET ORAL
COMMUNITY
End: 2021-02-22

## 2021-01-28 RX ORDER — GABAPENTIN 300 MG/1
300 CAPSULE ORAL 3 TIMES DAILY
COMMUNITY
End: 2021-02-09

## 2021-01-28 RX ORDER — HYDROCODONE BITARTRATE AND ACETAMINOPHEN 5; 325 MG/1; MG/1
1 TABLET ORAL EVERY 8 HOURS PRN
COMMUNITY
End: 2021-02-22

## 2021-01-28 ASSESSMENT — PATIENT HEALTH QUESTIONNAIRE - PHQ9
SUM OF ALL RESPONSES TO PHQ QUESTIONS 1-9: 6
CLINICAL INTERPRETATION OF PHQ2 SCORE: 1
5. POOR APPETITE OR OVEREATING: 1 - SEVERAL DAYS

## 2021-01-28 ASSESSMENT — PAIN SCALES - GENERAL: PAINLEVEL: 10=SEVERE PAIN

## 2021-01-28 ASSESSMENT — FIBROSIS 4 INDEX: FIB4 SCORE: 0.47

## 2021-01-28 NOTE — PATIENT INSTRUCTIONS
Change zanaflex to 4-8mg QHS        Your procedure will be at the [facility] special procedure suite.    [facility] [place] [address]       PRE-PROCEDURE INSTRUCTIONS  You may take your regular medications except:   · No Anti-inflammatories 5 days prior to your procedure. Anti-inflammatories include medicines such as  ibuprofen (Motrin, Advil), Excedrin, Naproxen (Aleve, Anaprox, Naprelan, Naprosyn), Celecoxib (Celebrex), Diclofenac (Voltaren-XR tab), and Meloxicam (Mobic).   · You can take the remainder of your pain medications as prescribed.   · If you are having a diagnostic procedure such as a medial branch block, do not use her pain meds on the day of the procedure  · No Glucophage or Metformin 24 hours before your procedure. You may resume next day after your procedure.  · Call the physiatry office if you are taking or prescribed anti-biotics within five days of procedure.  · Please ask provider if you are taking any new diabetes medication.  · CONTINUE TAKING BLOOD PRESSURE MEDICATIONS AS PRESCRIBED.  · Pain medications will not be prescribed on the procedure day. Procedural pain medication may be used by your provider   · Call your doctor's office performing the procedure if you have a fever, chills, rash or new illness prior to your procedure    Anticoagulation/antiplatelet medications  No Blood thinning medications such as Coumadin or Plavix 5 days prior to procedure unless your doctor said to continue these medications. Call your doctor if a new medication is prescribed in this class.     Restrictions for eating before procedure:   · If you are getting procedural sedation, then do not eat to for 8 hours prior to procedure appointment time. Do not drink fluids for four hours prior to your procedure time.   · If you are not having procedural sedation, then Skip the meal prior to your procedure. If you have a morning procedure then skip breakfast. If you have an afternoon procedure then  skip lunch.   · You may drink clear liquids up to 2 hours prior to your procedure  · You must have a  the day of procedure to accompany you home.      POST PROCEDURE INSTRUCTIONS   · No heavy lifting, strenuous bending or strenuous exercise for 3 days after your procedure.  · No hot tubs, baths, swimming for 3 days after your procedure  · You can remove the bandage the day after the procedure.  · IF YOU RECEIVED A STEROID INJECTION. PLEASE NOTE THAT THERE MAY BE A DELAY FOR THE INJECTION TO START WORKING, THE DELAY MAY BE UP TO TWO WEEKS. IF YOU HAVE DIABETES, PLEASE NOTE THAT YOUR SUGAR LEVELS MAY BE ELEVATED FOR 1-2 DAYS AFTER A STEROID INJECTION.  THE STEROID MAY CAUSE TEMPORARY SYMPTOMS WHICH USUALLY RESOLVE ON THEIR OWN WITHIN 1 TO 2 DAYS INCLUDING FACIAL FLUSHING OR A FEELING OF WARMTH ON THE FACE, TEMPORARY INCREASES IN BLOOD SUGAR, INSOMNIA, INCREASED HUNGER  · IF YOU EXPERIENCE PROLONGED WEAKNESS LONGER THAN ONE DAY, BOWEL OR BLADDER INCONTINENCE THEN PLEASE CALL THE PHYSIATRY OFFICE.  · Your leg may feel heavy, weak and numb for up to 1-2 days. Be very careful walking.   ·  You may resume normal activities 3 days after procedure.

## 2021-01-28 NOTE — H&P (VIEW-ONLY)
New patient note    Physiatry (physical medicine and  Rehabilitation), interventional spine and sports medicine    Date of service: See epic    Chief complaint:   Chief Complaint   Patient presents with   • New Patient     Back pain        Referring provider: Self referred    HISTORY    HPI: Roxanna Guzmán 35 y.o.  who presents today with Diagnoses of Lumbar radiculopathy, Impaired mobility and ADLs, Weakness of left lower extremity, Left leg numbness, Hyperreflexia bilateral knees, and Hyporeflexia left S1 were pertinent to this visit.    HPI    Acute onset severe left-sided low back pain 10 out of 10 in intensity radiating down the left leg down the posterior aspect of the heel and the dorsal aspect of the foot.  Worse with bending, walking, sitting.  Causing functional deficits and difficulty with ADLs.  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  She is tried multiple treatments including Zanaflex, gabapentin, Lyrica, Norco, benzodiazepines.       ROS:   Red Flags ROS:   Fever, Chills, Sweats: Denies  Involuntary Weight Loss: Denies  Bladder Incontinence: Denies  Bowel Incontinence: denies  Saddle Anesthesia: Denies    All other systems reviewed and negative.       PMHx:   Past Medical History:   Diagnosis Date   • Anxiety    • Depression    • Head ache    • Migraine    • Seizure disorder, complex partial (HCC)    • TMJ arthropathy          Current Outpatient Medications on File Prior to Visit   Medication Sig Dispense Refill   • gabapentin (NEURONTIN) 300 MG Cap Take 300 mg by mouth 3 times a day.     • tizanidine (ZANAFLEX) 4 MG Tab Take 4 mg by mouth every 6 hours as needed.     • HYDROcodone-acetaminophen (NORCO) 5-325 MG Tab per tablet Take 1 Tab by mouth every 8 hours as needed.     • lacosamide (VIMPAT) 200 MG Tab tablet TAKE 1 TABLET BY MOUTH TWICE A DAY FOR 30 DAYS. G40.909, G43.709     • prednisoLONE sodium phosphate (INFLAMASE FORTE) 1 % Solution 4-6 drops into  affected ear     • clonazePAM (KLONOPIN) 1 MG Tab TAKE 1 TABLET BY MOUTH AT BEDTIME AS NEEDED FOR INSOMNIA FOR 30 DAYS. F33.1     • zolpidem (AMBIEN) 10 MG Tab zolpidem 10 mg tablet   Take 1 tablet every day by oral route at bedtime for 30 days.     • rizatriptan (MAXALT-MLT) 10 MG disintegrating tablet Take 1/2-1 tablet po at onset of headache, may repeat dose in 2 hours if unrelieved.  Do not exceed more than 2 tablets in 24 hours. 9 Tab 5   • fluticasone (FLONASE) 50 MCG/ACT nasal spray Spray 1 Spray in nose every day. 16 g 0   • ketorolac (TORADOL) 30 MG/ML Solution 15 mg by Intravenous route every 6 hours as needed.     • Pseudoephedrine HCl (SUDAFED PO) Take  by mouth.     • ALPRAZolam (XANAX) 1 MG Tab 0.5 mg.  2   • azelastine (ASTELIN) 137 MCG/SPRAY nasal spray 2 sprays in each nostril     • Galcanezumab-gnlm (EMGALITY) 120 MG/ML Solution Auto-injector Inject 120 mg as instructed Q30 DAYS. (Patient not taking: Reported on 1/28/2021) 1 mL 5   • Pseudoephedrine-guaiFENesin (MUCINEX D PO) Take  by mouth.       No current facility-administered medications on file prior to visit.         PSHx:   Past Surgical History:   Procedure Laterality Date   • BLEPHAROPLASTY Bilateral 2019   • LEEP  2003   • HERNIA REPAIR     • LIPOMA EXCISION         Family history   Family History   Problem Relation Age of Onset   • Hypertension Mother    • Cancer Father         prostate cancer   • Depression Father    • Genetic Disorder Father         Unknown Neuromusk disease   • Hypertension Maternal Grandmother    • Hyperlipidemia Maternal Grandmother    • Genetic Disorder Brother         Unknown Neuromusk disease   • Genetic Disorder Paternal Aunt         Unknown Neuromusk disease   • Genetic Disorder Paternal Grandmother         Unknown Neuromusk disease         Medications: reviewed on epic.   Outpatient Medications Marked as Taking for the 1/28/21 encounter (Office Visit) with Aldair Segovia M.D.   Medication Sig Dispense Refill    • gabapentin (NEURONTIN) 300 MG Cap Take 300 mg by mouth 3 times a day.     • tizanidine (ZANAFLEX) 4 MG Tab Take 4 mg by mouth every 6 hours as needed.     • HYDROcodone-acetaminophen (NORCO) 5-325 MG Tab per tablet Take 1 Tab by mouth every 8 hours as needed.     • lacosamide (VIMPAT) 200 MG Tab tablet TAKE 1 TABLET BY MOUTH TWICE A DAY FOR 30 DAYS. G40.909, G43.709     • prednisoLONE sodium phosphate (INFLAMASE FORTE) 1 % Solution 4-6 drops into affected ear     • clonazePAM (KLONOPIN) 1 MG Tab TAKE 1 TABLET BY MOUTH AT BEDTIME AS NEEDED FOR INSOMNIA FOR 30 DAYS. F33.1     • zolpidem (AMBIEN) 10 MG Tab zolpidem 10 mg tablet   Take 1 tablet every day by oral route at bedtime for 30 days.     • rizatriptan (MAXALT-MLT) 10 MG disintegrating tablet Take 1/2-1 tablet po at onset of headache, may repeat dose in 2 hours if unrelieved.  Do not exceed more than 2 tablets in 24 hours. 9 Tab 5   • fluticasone (FLONASE) 50 MCG/ACT nasal spray Spray 1 Spray in nose every day. 16 g 0   • ketorolac (TORADOL) 30 MG/ML Solution 15 mg by Intravenous route every 6 hours as needed.     • Pseudoephedrine HCl (SUDAFED PO) Take  by mouth.     • ALPRAZolam (XANAX) 1 MG Tab 0.5 mg.  2        Allergies:   No Known Allergies    Social Hx:   Social History     Socioeconomic History   • Marital status:      Spouse name: Not on file   • Number of children: Not on file   • Years of education: Not on file   • Highest education level: Not on file   Occupational History   • Not on file   Social Needs   • Financial resource strain: Not on file   • Food insecurity     Worry: Not on file     Inability: Not on file   • Transportation needs     Medical: Not on file     Non-medical: Not on file   Tobacco Use   • Smoking status: Never Smoker   • Smokeless tobacco: Never Used   Substance and Sexual Activity   • Alcohol use: Yes     Comment: soc   • Drug use: No   • Sexual activity: Never     Birth control/protection: Patch   Lifestyle   •  "Physical activity     Days per week: Not on file     Minutes per session: Not on file   • Stress: Not on file   Relationships   • Social connections     Talks on phone: Not on file     Gets together: Not on file     Attends Jewish service: Not on file     Active member of club or organization: Not on file     Attends meetings of clubs or organizations: Not on file     Relationship status: Not on file   • Intimate partner violence     Fear of current or ex partner: Not on file     Emotionally abused: Not on file     Physically abused: Not on file     Forced sexual activity: Not on file   Other Topics Concern   •  Service No   • Blood Transfusions No   • Caffeine Concern No   • Occupational Exposure No   • Hobby Hazards No   • Sleep Concern Yes   • Stress Concern Yes   • Weight Concern No   • Special Diet No   • Back Care No   • Exercise Yes   • Bike Helmet No   • Seat Belt Yes   • Self-Exams Yes   Social History Narrative   • Not on file         EXAMINATION     Physical Exam:   Vitals: /62 (BP Location: Right arm, Patient Position: Sitting, BP Cuff Size: Adult)   Pulse 89   Temp 37 °C (98.6 °F) (Temporal)   Ht 1.753 m (5' 9\")   Wt 70.2 kg (154 lb 12.2 oz)   SpO2 97%     Constitutional:   Body Habitus: Body mass index is 22.85 kg/m².  Cooperation: Fully cooperates with exam  Appearance: Well-groomed, well-nourished, not disheveled     Eyes: No scleral icterus to suggest severe liver disease, no proptosis to suggest severe hyperthyroid    ENT -no obvious auditory deficits, no obvious tongue lesions, tongue midline, no facial droop     Skin -no rashes or lesions noted     Respiratory-  breathing comfortable on room air, no audible wheezing    Cardiovascular- capillary refills less than 2 seconds. No lower extremity edema is noted.     Gastrointestinal - no obvious abdominal masses, No tenderness to palpation in the abdomen    Psychiatric- alert and oriented ×3. Normal affect.     Gait -difficulty " with heel walking and toe walking because of left-sided weakness    Musculoskeletal and Neuro -       Cervical spine   Inspection: No deformities of the skin over the cervical spine. No rashes or lesions.    full   active range of motion in all directions, without  pain        Key points for the international standards for neurological classification of spinal cord injury (ISNCSCI) to light touch.     Dermatome R L   C4 2 2   C5 2 2   C6 2 2   C7 2 2   C8 2 2   T1 2 2   T2 2 2                                     Motor Exam Upper Extremities   ? Myotome R L   Shoulder flexion C5 5 5   Elbow flexion C5 5 5   Wrist extension C6 5 5   Elbow extension C7 5 5   Finger flexion C8 5 5   Finger abduction T1 5 5     Reflexes  ?  R L   Biceps  2+ 2+   Brachioradialis  2+ 2+     Ho’s sign positive bilaterally            Thoracic/Lumbar Spine/Sacral Spine/Hips   Inspection: No evidence of atrophy in bilateral lower extremities throughout     ROM: decreased active range of motion with flexion, lateral flexion, and rotation bilaterally.   There is decreased active range of motion with lumbar extension     Palpation:   No tenderness to palpation in midline at T1-T12 levels. No tenderness to palpation in the left and right of the midline T1-L5, NEGATIVE for tenderness to palpation to the para-midline region in the lower lumbar levels.  palpation over SI joint: negative bilaterally    palpation in hip or over the gluteus medius tendon insertion: negative bilaterally      Lumbar spine Special tests  Neuro tension  Straight leg test negative right, positive left    Slump test negative right, positive left      HIP  FAIR test negative bilaterally    Range of motion in the RIGHT hip is full  in flexion, extension, abduction, internal rotation, external rotation.  Range of motion in the LEFT hip is full  in flexion, extension, abduction, internal rotation, external rotation.      SI joint tests  Observation patient sits on one  buttocks: Negative  SI joint compression negative bilaterally    SI joint distraction negative bilaterally    Thigh thrust test negative bilaterally    BRANDON test negative bilaterally                 Key points for the international standards for neurological classification of spinal cord injury (ISNCSCI) to light touch.     Dermatome R L                                      L2 2 2   L3 2 2   L4 2 2   L5 2 2   S1 2 2   S2 2 2       Motor Exam Lower Extremities    ? Myotome R L   Hip flexion L2 5 5   Knee extension L3 5 5   Ankle dorsiflexion L4 5 5   Toe extension L5 5 4   Ankle plantarflexion S1 5 4         Reflexes  ?  R L                Patella  3+ 3+   Achilles   2+ 1+       Babinski sign negative bilaterally   Clonus of the ankle negative bilaterally       MEDICAL DECISION MAKING    Medical records review: see under HPI section.     DATA    Labs:   Lab Results   Component Value Date/Time    SODIUM 138 01/13/2020 09:01 AM    POTASSIUM 4.1 01/13/2020 09:01 AM    CHLORIDE 103 01/13/2020 09:01 AM    CO2 25 01/13/2020 09:01 AM    ANION 10.0 01/13/2020 09:01 AM    GLUCOSE 92 01/13/2020 09:01 AM    BUN 21 01/13/2020 09:01 AM    CREATININE 0.87 01/13/2020 09:01 AM    CALCIUM 8.5 01/13/2020 09:01 AM    ASTSGOT 18 01/13/2020 09:01 AM    ALTSGPT 21 01/13/2020 09:01 AM    TBILIRUBIN 0.7 01/13/2020 09:01 AM    ALBUMIN 4.1 01/13/2020 09:01 AM    TOTPROTEIN 6.4 01/13/2020 09:01 AM    GLOBULIN 2.3 01/13/2020 09:01 AM    AGRATIO 1.8 01/13/2020 09:01 AM   ]    No results found for: PROTHROMBTM, INR     Lab Results   Component Value Date/Time    WBC 7.4 01/13/2020 09:01 AM    RBC 4.74 01/13/2020 09:01 AM    HEMOGLOBIN 14.2 01/13/2020 09:01 AM    HEMATOCRIT 42.6 01/13/2020 09:01 AM    MCV 89.9 01/13/2020 09:01 AM    MCH 30.0 01/13/2020 09:01 AM    MCHC 33.3 (L) 01/13/2020 09:01 AM    MPV 10.4 01/13/2020 09:01 AM    NEUTSPOLYS 63.30 01/13/2020 09:01 AM    LYMPHOCYTES 26.50 01/13/2020 09:01 AM    MONOCYTES 7.00 01/13/2020 09:01 AM       EOSINOPHILS 2.00 01/13/2020 09:01 AM    BASOPHILS 0.90 01/13/2020 09:01 AM        Lab Results   Component Value Date/Time    HBA1C 5.2 01/13/2020 09:01 AM        Imaging:   I personally reviewed following images, these are my reads  I reviewed the MRI lumbar spine from 1/20/2021  There is transitional anatomy with a disc space between S1 and S2.  At L5-S1 there is a large disc extrusion in the paracentric region which displaces the descending S1 nerve root.    IMAGING radiology reads. I reviewed the following radiology reads           MRI lumbar spine                                                                                                                                                                                                     Diagnosis   Visit Diagnoses     ICD-10-CM   1. Lumbar radiculopathy  M54.16   2. Impaired mobility and ADLs  Z74.09    Z78.9   3. Weakness of left lower extremity  R29.898   4. Left leg numbness  R20.0   5. Hyperreflexia bilateral knees  R29.2   6. Hyporeflexia left S1  R29.2           ASSESSMENT AND PLAN:  Roxanna Ramirez 35 y.o. female      Roxanna was seen today for new patient.    Diagnoses and all orders for this visit:    Lumbar radiculopathy  -     REFERRAL TO PHYSICAL MEDICINE REHAB  -     REFERRAL TO PHYSICAL MEDICINE REHAB    Impaired mobility and ADLs    Weakness of left lower extremity    Left leg numbness    Hyperreflexia bilateral knees    Hyporeflexia left S1      Severe pain from a lumbar disc herniation with radiculopathy.  Status post epidural with no improvement.  She is tried multiple different medications described above.  Given the severity of pain and lack of movement the patient would have an exacerbation with physical therapy so we will hold off on this for now.    We discussed her peculiar reflexes above.  She does note hyperreflexia has run in her family and there has been no definitive diagnosis.  We discussed having the patient follow-up with potentially  neurology or neuro rehab physiatry after treatment for her back    Diagnostic workup: Procedure below for diagnostic and therapeutic purposes    Medications: Change Zanaflex to 4-8 mg nightly as needed.  Increase gabapentin to 600 mg in the morning, 600 mg midday and up to 900 mg at night.  I counseled the patient to reduce the dose of gabapentin if she does have cognitive side effects.    Interventional program:   I have ordered a left L5-S1 and S1 transforaminal epidural steroid injection.  I have also ordered a repeat injection given the severity of pain as well as the above weakness and numbness.      Outside records requested:  The patient signed outside records request form for her outside records including outside images. This includes the records from [facility] neurosurgery [facility] [place]      Follow-up: After the above diagnostic studies           Please note that this dictation was created using voice recognition software. I have made every reasonable attempt to correct obvious errors but there may be errors of grammar and content that I may have overlooked prior to finalization of this note.      Aldair Segovia MD  Physical Medicine and Rehabilitation  Interventional Spine and Sports Physiatry  [facility]

## 2021-01-28 NOTE — PROGRESS NOTES
New patient note    Physiatry (physical medicine and  Rehabilitation), interventional spine and sports medicine    Date of service: See epic    Chief complaint:   Chief Complaint   Patient presents with   • New Patient     Back pain        Referring provider: Self referred    HISTORY    HPI: Roxanna Guzmán 35 y.o.  who presents today with Diagnoses of Lumbar radiculopathy, Impaired mobility and ADLs, Weakness of left lower extremity, Left leg numbness, Hyperreflexia bilateral knees, and Hyporeflexia left S1 were pertinent to this visit.    HPI    Acute onset severe left-sided low back pain 10 out of 10 in intensity radiating down the left leg down the posterior aspect of the heel and the dorsal aspect of the foot.  Worse with bending, walking, sitting.  Causing functional deficits and difficulty with ADLs.  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  She is tried multiple treatments including Zanaflex, gabapentin, Lyrica, Norco, benzodiazepines.       ROS:   Red Flags ROS:   Fever, Chills, Sweats: Denies  Involuntary Weight Loss: Denies  Bladder Incontinence: Denies  Bowel Incontinence: denies  Saddle Anesthesia: Denies    All other systems reviewed and negative.       PMHx:   Past Medical History:   Diagnosis Date   • Anxiety    • Depression    • Head ache    • Migraine    • Seizure disorder, complex partial (HCC)    • TMJ arthropathy          Current Outpatient Medications on File Prior to Visit   Medication Sig Dispense Refill   • gabapentin (NEURONTIN) 300 MG Cap Take 300 mg by mouth 3 times a day.     • tizanidine (ZANAFLEX) 4 MG Tab Take 4 mg by mouth every 6 hours as needed.     • HYDROcodone-acetaminophen (NORCO) 5-325 MG Tab per tablet Take 1 Tab by mouth every 8 hours as needed.     • lacosamide (VIMPAT) 200 MG Tab tablet TAKE 1 TABLET BY MOUTH TWICE A DAY FOR 30 DAYS. G40.909, G43.709     • prednisoLONE sodium phosphate (INFLAMASE FORTE) 1 % Solution 4-6 drops into  affected ear     • clonazePAM (KLONOPIN) 1 MG Tab TAKE 1 TABLET BY MOUTH AT BEDTIME AS NEEDED FOR INSOMNIA FOR 30 DAYS. F33.1     • zolpidem (AMBIEN) 10 MG Tab zolpidem 10 mg tablet   Take 1 tablet every day by oral route at bedtime for 30 days.     • rizatriptan (MAXALT-MLT) 10 MG disintegrating tablet Take 1/2-1 tablet po at onset of headache, may repeat dose in 2 hours if unrelieved.  Do not exceed more than 2 tablets in 24 hours. 9 Tab 5   • fluticasone (FLONASE) 50 MCG/ACT nasal spray Spray 1 Spray in nose every day. 16 g 0   • ketorolac (TORADOL) 30 MG/ML Solution 15 mg by Intravenous route every 6 hours as needed.     • Pseudoephedrine HCl (SUDAFED PO) Take  by mouth.     • ALPRAZolam (XANAX) 1 MG Tab 0.5 mg.  2   • azelastine (ASTELIN) 137 MCG/SPRAY nasal spray 2 sprays in each nostril     • Galcanezumab-gnlm (EMGALITY) 120 MG/ML Solution Auto-injector Inject 120 mg as instructed Q30 DAYS. (Patient not taking: Reported on 1/28/2021) 1 mL 5   • Pseudoephedrine-guaiFENesin (MUCINEX D PO) Take  by mouth.       No current facility-administered medications on file prior to visit.         PSHx:   Past Surgical History:   Procedure Laterality Date   • BLEPHAROPLASTY Bilateral 2019   • LEEP  2003   • HERNIA REPAIR     • LIPOMA EXCISION         Family history   Family History   Problem Relation Age of Onset   • Hypertension Mother    • Cancer Father         prostate cancer   • Depression Father    • Genetic Disorder Father         Unknown Neuromusk disease   • Hypertension Maternal Grandmother    • Hyperlipidemia Maternal Grandmother    • Genetic Disorder Brother         Unknown Neuromusk disease   • Genetic Disorder Paternal Aunt         Unknown Neuromusk disease   • Genetic Disorder Paternal Grandmother         Unknown Neuromusk disease         Medications: reviewed on epic.   Outpatient Medications Marked as Taking for the 1/28/21 encounter (Office Visit) with Aldair Segovia M.D.   Medication Sig Dispense Refill    • gabapentin (NEURONTIN) 300 MG Cap Take 300 mg by mouth 3 times a day.     • tizanidine (ZANAFLEX) 4 MG Tab Take 4 mg by mouth every 6 hours as needed.     • HYDROcodone-acetaminophen (NORCO) 5-325 MG Tab per tablet Take 1 Tab by mouth every 8 hours as needed.     • lacosamide (VIMPAT) 200 MG Tab tablet TAKE 1 TABLET BY MOUTH TWICE A DAY FOR 30 DAYS. G40.909, G43.709     • prednisoLONE sodium phosphate (INFLAMASE FORTE) 1 % Solution 4-6 drops into affected ear     • clonazePAM (KLONOPIN) 1 MG Tab TAKE 1 TABLET BY MOUTH AT BEDTIME AS NEEDED FOR INSOMNIA FOR 30 DAYS. F33.1     • zolpidem (AMBIEN) 10 MG Tab zolpidem 10 mg tablet   Take 1 tablet every day by oral route at bedtime for 30 days.     • rizatriptan (MAXALT-MLT) 10 MG disintegrating tablet Take 1/2-1 tablet po at onset of headache, may repeat dose in 2 hours if unrelieved.  Do not exceed more than 2 tablets in 24 hours. 9 Tab 5   • fluticasone (FLONASE) 50 MCG/ACT nasal spray Spray 1 Spray in nose every day. 16 g 0   • ketorolac (TORADOL) 30 MG/ML Solution 15 mg by Intravenous route every 6 hours as needed.     • Pseudoephedrine HCl (SUDAFED PO) Take  by mouth.     • ALPRAZolam (XANAX) 1 MG Tab 0.5 mg.  2        Allergies:   No Known Allergies    Social Hx:   Social History     Socioeconomic History   • Marital status:      Spouse name: Not on file   • Number of children: Not on file   • Years of education: Not on file   • Highest education level: Not on file   Occupational History   • Not on file   Social Needs   • Financial resource strain: Not on file   • Food insecurity     Worry: Not on file     Inability: Not on file   • Transportation needs     Medical: Not on file     Non-medical: Not on file   Tobacco Use   • Smoking status: Never Smoker   • Smokeless tobacco: Never Used   Substance and Sexual Activity   • Alcohol use: Yes     Comment: soc   • Drug use: No   • Sexual activity: Never     Birth control/protection: Patch   Lifestyle   •  "Physical activity     Days per week: Not on file     Minutes per session: Not on file   • Stress: Not on file   Relationships   • Social connections     Talks on phone: Not on file     Gets together: Not on file     Attends [employer] service: Not on file     Active member of club or organization: Not on file     Attends meetings of clubs or organizations: Not on file     Relationship status: Not on file   • Intimate partner violence     Fear of current or ex partner: Not on file     Emotionally abused: Not on file     Physically abused: Not on file     Forced sexual activity: Not on file   Other Topics Concern   •  Service No   • Blood Transfusions No   • Caffeine Concern No   • Occupational Exposure No   • Hobby Hazards No   • Sleep Concern Yes   • Stress Concern Yes   • Weight Concern No   • Special Diet No   • Back Care No   • Exercise Yes   • Bike Helmet No   • Seat Belt Yes   • Self-Exams Yes   Social History Narrative   • Not on file         EXAMINATION     Physical Exam:   Vitals: /62 (BP Location: Right arm, Patient Position: Sitting, BP Cuff Size: Adult)   Pulse 89   Temp 37 °C (98.6 °F) (Temporal)   Ht 1.753 m (5' 9\")   Wt 70.2 kg (154 lb 12.2 oz)   SpO2 97%     Constitutional:   Body Habitus: Body mass index is 22.85 kg/m².  Cooperation: Fully cooperates with exam  Appearance: Well-groomed, well-nourished, not disheveled     Eyes: No scleral icterus to suggest severe liver disease, no proptosis to suggest severe hyperthyroid    ENT -no obvious auditory deficits, no obvious tongue lesions, tongue midline, no facial droop     Skin -no rashes or lesions noted     Respiratory-  breathing comfortable on room air, no audible wheezing    Cardiovascular- capillary refills less than 2 seconds. No lower extremity edema is noted.     Gastrointestinal - no obvious abdominal masses, No tenderness to palpation in the abdomen    Psychiatric- alert and oriented ×3. Normal affect.     Gait -difficulty " with heel walking and toe walking because of left-sided weakness    Musculoskeletal and Neuro -       Cervical spine   Inspection: No deformities of the skin over the cervical spine. No rashes or lesions.    full   active range of motion in all directions, without  pain        Key points for the international standards for neurological classification of spinal cord injury (ISNCSCI) to light touch.     Dermatome R L   C4 2 2   C5 2 2   C6 2 2   C7 2 2   C8 2 2   T1 2 2   T2 2 2                                     Motor Exam Upper Extremities   ? Myotome R L   Shoulder flexion C5 5 5   Elbow flexion C5 5 5   Wrist extension C6 5 5   Elbow extension C7 5 5   Finger flexion C8 5 5   Finger abduction T1 5 5     Reflexes  ?  R L   Biceps  2+ 2+   Brachioradialis  2+ 2+     Ho’s sign positive bilaterally            Thoracic/Lumbar Spine/Sacral Spine/Hips   Inspection: No evidence of atrophy in bilateral lower extremities throughout     ROM: decreased active range of motion with flexion, lateral flexion, and rotation bilaterally.   There is decreased active range of motion with lumbar extension     Palpation:   No tenderness to palpation in midline at T1-T12 levels. No tenderness to palpation in the left and right of the midline T1-L5, NEGATIVE for tenderness to palpation to the para-midline region in the lower lumbar levels.  palpation over SI joint: negative bilaterally    palpation in hip or over the gluteus medius tendon insertion: negative bilaterally      Lumbar spine Special tests  Neuro tension  Straight leg test negative right, positive left    Slump test negative right, positive left      HIP  FAIR test negative bilaterally    Range of motion in the RIGHT hip is full  in flexion, extension, abduction, internal rotation, external rotation.  Range of motion in the LEFT hip is full  in flexion, extension, abduction, internal rotation, external rotation.      SI joint tests  Observation patient sits on one  buttocks: Negative  SI joint compression negative bilaterally    SI joint distraction negative bilaterally    Thigh thrust test negative bilaterally    BRANDON test negative bilaterally                 Key points for the international standards for neurological classification of spinal cord injury (ISNCSCI) to light touch.     Dermatome R L                                      L2 2 2   L3 2 2   L4 2 2   L5 2 2   S1 2 2   S2 2 2       Motor Exam Lower Extremities    ? Myotome R L   Hip flexion L2 5 5   Knee extension L3 5 5   Ankle dorsiflexion L4 5 5   Toe extension L5 5 4   Ankle plantarflexion S1 5 4         Reflexes  ?  R L                Patella  3+ 3+   Achilles   2+ 1+       Babinski sign negative bilaterally   Clonus of the ankle negative bilaterally       MEDICAL DECISION MAKING    Medical records review: see under HPI section.     DATA    Labs:   Lab Results   Component Value Date/Time    SODIUM 138 01/13/2020 09:01 AM    POTASSIUM 4.1 01/13/2020 09:01 AM    CHLORIDE 103 01/13/2020 09:01 AM    CO2 25 01/13/2020 09:01 AM    ANION 10.0 01/13/2020 09:01 AM    GLUCOSE 92 01/13/2020 09:01 AM    BUN 21 01/13/2020 09:01 AM    CREATININE 0.87 01/13/2020 09:01 AM    CALCIUM 8.5 01/13/2020 09:01 AM    ASTSGOT 18 01/13/2020 09:01 AM    ALTSGPT 21 01/13/2020 09:01 AM    TBILIRUBIN 0.7 01/13/2020 09:01 AM    ALBUMIN 4.1 01/13/2020 09:01 AM    TOTPROTEIN 6.4 01/13/2020 09:01 AM    GLOBULIN 2.3 01/13/2020 09:01 AM    AGRATIO 1.8 01/13/2020 09:01 AM   ]    No results found for: PROTHROMBTM, INR     Lab Results   Component Value Date/Time    WBC 7.4 01/13/2020 09:01 AM    RBC 4.74 01/13/2020 09:01 AM    HEMOGLOBIN 14.2 01/13/2020 09:01 AM    HEMATOCRIT 42.6 01/13/2020 09:01 AM    MCV 89.9 01/13/2020 09:01 AM    MCH 30.0 01/13/2020 09:01 AM    MCHC 33.3 (L) 01/13/2020 09:01 AM    MPV 10.4 01/13/2020 09:01 AM    NEUTSPOLYS 63.30 01/13/2020 09:01 AM    LYMPHOCYTES 26.50 01/13/2020 09:01 AM    MONOCYTES 7.00 01/13/2020 09:01 AM     EOSINOPHILS 2.00 01/13/2020 09:01 AM    BASOPHILS 0.90 01/13/2020 09:01 AM        Lab Results   Component Value Date/Time    HBA1C 5.2 01/13/2020 09:01 AM        Imaging:   I personally reviewed following images, these are my reads  I reviewed the MRI lumbar spine from 1/20/2021  There is transitional anatomy with a disc space between S1 and S2.  At L5-S1 there is a large disc extrusion in the paracentric region which displaces the descending S1 nerve root.    IMAGING radiology reads. I reviewed the following radiology reads           MRI lumbar spine                                                                                                                                                                                                     Diagnosis   Visit Diagnoses     ICD-10-CM   1. Lumbar radiculopathy  M54.16   2. Impaired mobility and ADLs  Z74.09    Z78.9   3. Weakness of left lower extremity  R29.898   4. Left leg numbness  R20.0   5. Hyperreflexia bilateral knees  R29.2   6. Hyporeflexia left S1  R29.2           ASSESSMENT AND PLAN:  Roxanna Ramirez 35 y.o. female      Roxanna was seen today for new patient.    Diagnoses and all orders for this visit:    Lumbar radiculopathy  -     REFERRAL TO PHYSICAL MEDICINE REHAB  -     REFERRAL TO PHYSICAL MEDICINE REHAB    Impaired mobility and ADLs    Weakness of left lower extremity    Left leg numbness    Hyperreflexia bilateral knees    Hyporeflexia left S1      Severe pain from a lumbar disc herniation with radiculopathy.  Status post epidural with no improvement.  She is tried multiple different medications described above.  Given the severity of pain and lack of movement the patient would have an exacerbation with physical therapy so we will hold off on this for now.    We discussed her peculiar reflexes above.  She does note hyperreflexia has run in her family and there has been no definitive diagnosis.  We discussed having the patient follow-up with potentially  neurology or neuro rehab physiatry after treatment for her back    Diagnostic workup: Procedure below for diagnostic and therapeutic purposes    Medications: Change Zanaflex to 4-8 mg nightly as needed.  Increase gabapentin to 600 mg in the morning, 600 mg midday and up to 900 mg at night.  I counseled the patient to reduce the dose of gabapentin if she does have cognitive side effects.    Interventional program:   I have ordered a left L5-S1 and S1 transforaminal epidural steroid injection.  I have also ordered a repeat injection given the severity of pain as well as the above weakness and numbness.      Outside records requested:  The patient signed outside records request form for her outside records including outside images. This includes the records from [facility] neurosurgery [facility] [place]      Follow-up: After the above diagnostic studies           Please note that this dictation was created using voice recognition software. I have made every reasonable attempt to correct obvious errors but there may be errors of grammar and content that I may have overlooked prior to finalization of this note.      Aldair Segovia MD  Physical Medicine and Rehabilitation  Interventional Spine and Sports Physiatry  [facility]

## 2021-01-29 ENCOUNTER — HOSPITAL ENCOUNTER (OUTPATIENT)
Facility: REHABILITATION | Age: 36
End: 2021-01-29
Attending: PHYSICAL MEDICINE & REHABILITATION | Admitting: PHYSICAL MEDICINE & REHABILITATION
Payer: COMMERCIAL

## 2021-01-29 NOTE — PREPROCEDURE INSTRUCTIONS
PAT call made 1/29/2021 at 1330, spoke with pt after confirming 2 pt identifiers. Health hx, medications, allergies reviewed with pt, as well as pre-procedure/sedation instructions. Respiratory screen completed. Pt denies herself or any household members of being sick/symptomatic (fever, cough, SOB, diarrhea) w/in last 2 wks, having close contact w/ sick individuals in the past 2 wks, or positive covid test in last 2 weeks. Pt education to notify her MD should she become symptomatic prior to procedure. Pt verbalizes understanding. Pt told to bring a form fitting mask and the she will be wearing it entire stay. Informed pt it is recommended pt self isolate for 72 hrs or from time of this call until procedure, also that we request the  to remain on site until pt is discharged. Pt verbalizes understanding.

## 2021-02-01 ENCOUNTER — HOSPITAL ENCOUNTER (OUTPATIENT)
Facility: REHABILITATION | Age: 36
End: 2021-02-01
Attending: PHYSICAL MEDICINE & REHABILITATION | Admitting: PHYSICAL MEDICINE & REHABILITATION
Payer: COMMERCIAL

## 2021-02-01 ENCOUNTER — APPOINTMENT (OUTPATIENT)
Dept: RADIOLOGY | Facility: REHABILITATION | Age: 36
End: 2021-02-01
Attending: PHYSICAL MEDICINE & REHABILITATION
Payer: COMMERCIAL

## 2021-02-01 VITALS
DIASTOLIC BLOOD PRESSURE: 76 MMHG | BODY MASS INDEX: 23.05 KG/M2 | HEART RATE: 96 BPM | HEIGHT: 69 IN | SYSTOLIC BLOOD PRESSURE: 142 MMHG | TEMPERATURE: 99.1 F | RESPIRATION RATE: 16 BRPM | OXYGEN SATURATION: 99 % | WEIGHT: 155.65 LBS

## 2021-02-01 PROCEDURE — 64484 NJX AA&/STRD TFRM EPI L/S EA: CPT

## 2021-02-01 PROCEDURE — 64483 NJX AA&/STRD TFRM EPI L/S 1: CPT

## 2021-02-01 RX ORDER — ONDANSETRON 2 MG/ML
4 INJECTION INTRAMUSCULAR; INTRAVENOUS
Status: DISCONTINUED | OUTPATIENT
Start: 2021-02-01 | End: 2021-02-01 | Stop reason: HOSPADM

## 2021-02-01 RX ORDER — LIDOCAINE HYDROCHLORIDE 10 MG/ML
INJECTION, SOLUTION EPIDURAL; INFILTRATION; INTRACAUDAL; PERINEURAL
Status: DISCONTINUED
Start: 2021-02-01 | End: 2021-02-01 | Stop reason: HOSPADM

## 2021-02-01 RX ORDER — DEXAMETHASONE SODIUM PHOSPHATE 10 MG/ML
INJECTION, SOLUTION INTRAMUSCULAR; INTRAVENOUS
Status: DISCONTINUED
Start: 2021-02-01 | End: 2021-02-01 | Stop reason: HOSPADM

## 2021-02-01 ASSESSMENT — FIBROSIS 4 INDEX: FIB4 SCORE: 0.47

## 2021-02-01 ASSESSMENT — PAIN DESCRIPTION - PAIN TYPE: TYPE: CHRONIC PAIN

## 2021-02-01 NOTE — INTERVAL H&P NOTE
H&P reviewed. The patient was examined and there are no changes to the H&P     Lungs clear to auscultation bilaterally.  No abdominal tenderness.  Heart regular rate and rhythm.  Vitals reviewed.    Proceed with the originally scheduled procedure.  The risks, benefits and alternatives were discussed.  The patient understands these.    Aldair Segovia MD  Physical Medicine and Rehabilitation  Interventional Spine and Sports Physiatry  [facility]

## 2021-02-01 NOTE — PROGRESS NOTES
Preprocedure assessment completed.  Pertinent health information (NKA) communicated to physician and staff during time out.  Patient positioned preprocedure by RN, CST, CNA, and XRAY.  Pillow under ankles for support.    Ayesha Lugo RN BSN

## 2021-02-01 NOTE — NON-PROVIDER
Pt tolerated food and fluid post procedure without nausea or vomiting. Ambulated with slight  Difficulty with standby assist. Discharged into care of responsible adult.

## 2021-02-01 NOTE — OP REPORT
Date of Service: 2/1/2021     Patient: Roxanna Guzmán 35 y.o. female     MRN: [id number]     Physician/s: Aldair Segovia MD    Pre-operative Diagnosis: Lumbar radiculopathy    Post-operative Diagnosis: Lumbar radiculopathy    Procedure: left Lumbar Transforaminal Epidural Steroid  at the L5-S1 and S1 levels.     Description of procedure:    The risks, benefits, and alternatives of the procedure were reviewed and discussed with the patient.  Written informed consent was freely obtained. A pre-procedural time-out was conducted by the physician verifying patient’s identity, procedure to be performed, procedure site and side, and allergy verification. Appropriate equipment was determined to be in place for the procedure.     Extra time was spent reviewing the patient's anatomy given her transitional anatomy.    The patient's vital signs were carefully monitored before, throughout, and after the procedure.     In the fluoroscopy suite the patient was placed in a prone position, a pillow placed underneath their umbilicus. The skin was prepped and draped in the usual sterile fashion.     The fluoroscope was placed over the lumbar spine and adjusted into the proper AP/Oblique view to enter the transforaminal space just adjacent to the pedicle at the levels below. The targets for injection were then marked at the left L5-S1. A 27g 1.5 inch needle was placed into the marked site, and 1mL of 1% Lidocaine was injected subcutaneously into the epidermal and dermal layers. The needle was removed intact.  A 25g 3.5 inch spinal needle was then placed and advanced under fluoroscopic guidance in an oblique view towards the epidural space of the levels noted above. The needle position was confirmed to not be past the 6 o'clock position in the AP view and it was in the neuroforamen in the lateral view.        The fluoroscope was was then adjusted over the lumbar spine and adjusted into the proper AP/Oblique view to enter the  transforaminal space adjacent to the pedicle at the LEFT  S1. A 27g 1.5 inch needle was placed into the marked site, and 1mL of 1% Lidocaine was injected subcutaneously into the epidermal and dermal layers. The needle was removed intact.  A 25g 3.5 inch spinal needle was then placed and advanced under fluoroscopic guidance in an oblique view towards the epidural space of the levels noted above. The needle position was confirmed to not be past the 6 o'clock position in the AP view and it was in the neuroforamen in the lateral view.       Under live fluoroscopic guidance in the AP view, contrast dye was used to highlight the epidural space spread of each level above. Final fluoroscopic images were saved.  Following negative aspiration, 1mL of 1% lidocaine preservative free with 10 mg of dexamethasone was then injected at each level above, and the needles were removed intact after restyleted. The patient's back was covered with a 4x4 gauze, the area was cleansed with sterile normal saline, and a dressing was applied. There were no complications noted.     The patient was then evaluated post-procedure, and was hemodynamically stable prior to leaving the post-operative care unit.     Follow-up as scheduled    Aldair Segovia MD  Physical Medicine and Rehabilitation  Interventional Spine and Sports Physiatry  [facility]          CPT codes  Transforaminal epidural injection- lumbar or sacral (first level):  51775  Transforaminal epidural injection- lumbar or sacral (each additional level):  21439

## 2021-02-03 ENCOUNTER — TELEPHONE (OUTPATIENT)
Dept: PHYSICAL MEDICINE AND REHAB | Facility: MEDICAL CENTER | Age: 36
End: 2021-02-03

## 2021-02-03 NOTE — TELEPHONE ENCOUNTER
Thank you for the update. I am hopeful that this will continue to improve over the next several weeks.     Aldair Segovia MD

## 2021-02-03 NOTE — TELEPHONE ENCOUNTER
Spoke to Pt in regards to her SP that was done with Dr. Segovia dated 2/1/21 for her left L5-S1 and S1 transforaminal epidural steroid injection with fluoroscopic guidance.  Pt stated that she woke up around 3am this morning with pain level 5 out of 10, sharp shooting pain from her pelvis down to her left leg and ankle.  Pt stated that yesterday after she had the injection she felt better.  Pt stating she's giving some time for the medication kick in.    She will update us if she notice something concerning.    Thank you

## 2021-02-04 ENCOUNTER — PATIENT MESSAGE (OUTPATIENT)
Dept: PHYSICAL MEDICINE AND REHAB | Facility: MEDICAL CENTER | Age: 36
End: 2021-02-04

## 2021-02-04 DIAGNOSIS — M54.16 LUMBAR RADICULOPATHY: ICD-10-CM

## 2021-02-04 DIAGNOSIS — R20.0 LEFT LEG NUMBNESS: ICD-10-CM

## 2021-02-04 DIAGNOSIS — R29.2 HYPERREFLEXIA: ICD-10-CM

## 2021-02-04 DIAGNOSIS — R29.2: ICD-10-CM

## 2021-02-04 DIAGNOSIS — Z74.09 IMPAIRED MOBILITY AND ADLS: ICD-10-CM

## 2021-02-04 DIAGNOSIS — R29.898 WEAKNESS OF LEFT LOWER EXTREMITY: ICD-10-CM

## 2021-02-04 DIAGNOSIS — R29.2 HYPOREFLEXIA: ICD-10-CM

## 2021-02-04 DIAGNOSIS — Z78.9 IMPAIRED MOBILITY AND ADLS: ICD-10-CM

## 2021-02-04 NOTE — PROGRESS NOTES
I called the patient.  She is having some improvement in her pain.  She still has significant functional deficits with difficulty sitting and bending and a difficult time doing her job.  She is able to walk and stand more easily at this point.  She has a difficult time with the VAS scale but states the pain is at least moderate in intensity with some flares where this can be more severe.  This is improved when compared to preprocedure.    I believe the patient would tolerate gentle physical therapy and manual therapy at this point.  I placed a referral for PT.

## 2021-02-05 ENCOUNTER — HOSPITAL ENCOUNTER (OUTPATIENT)
Dept: RADIOLOGY | Facility: MEDICAL CENTER | Age: 36
End: 2021-02-05
Payer: COMMERCIAL

## 2021-02-08 NOTE — PROGRESS NOTES
I called the patient to discuss.  She has had some improvement after the epidural.  She notes that the day of the epidural the pain was nearly gone during the lidocaine phase.  She has had only mild improvement during the steroid phase.  She has tried increasing gabapentin to 600 mg 3 times daily and she did get improvement with this.  She had difficulty concentrating but she notes that she is now staying home from work.  She has mostly lying down for the majority of the day as sitting causes severe pain and there was some relief with standing but prolonged standing is causing pain.  The severe pain is radiating down the left leg.  She notes no new numbness or weakness.    Increase gabapentin to 900 mg 3 times daily.  Continue Zanaflex 8 mg nightly as needed.    I had a extended discussion with the patient as she was unable to tolerate her home exercise program which I prescribed.  We discussed proceeding with conservative treatment with the upcoming epidural versus surgical intervention.  At this point given the severity of the patient's pain and functional deficits she is agreeable to proceed with surgery.    I also discussed the case with Dr. Daniel Corbett of spine surgery.

## 2021-02-09 ENCOUNTER — PATIENT MESSAGE (OUTPATIENT)
Dept: PHYSICAL MEDICINE AND REHAB | Facility: MEDICAL CENTER | Age: 36
End: 2021-02-09

## 2021-02-09 DIAGNOSIS — R20.0 LEFT LEG NUMBNESS: ICD-10-CM

## 2021-02-09 DIAGNOSIS — R29.898 WEAKNESS OF LEFT LOWER EXTREMITY: ICD-10-CM

## 2021-02-09 DIAGNOSIS — M54.16 LUMBAR RADICULOPATHY: ICD-10-CM

## 2021-02-09 RX ORDER — GABAPENTIN 300 MG/1
900 CAPSULE ORAL 3 TIMES DAILY PRN
Qty: 270 CAP | Refills: 6 | Status: SHIPPED | OUTPATIENT
Start: 2021-02-09 | End: 2021-06-01

## 2021-02-10 ENCOUNTER — HOSPITAL ENCOUNTER (OUTPATIENT)
Dept: RADIOLOGY | Facility: MEDICAL CENTER | Age: 36
End: 2021-02-10
Attending: NEUROLOGICAL SURGERY | Admitting: NEUROLOGICAL SURGERY
Payer: COMMERCIAL

## 2021-02-10 ENCOUNTER — PRE-ADMISSION TESTING (OUTPATIENT)
Dept: ADMISSIONS | Facility: MEDICAL CENTER | Age: 36
End: 2021-02-10
Attending: NEUROLOGICAL SURGERY
Payer: COMMERCIAL

## 2021-02-10 DIAGNOSIS — Z01.811 PRE-OPERATIVE RESPIRATORY EXAMINATION: ICD-10-CM

## 2021-02-10 DIAGNOSIS — Z01.810 PRE-OPERATIVE CARDIOVASCULAR EXAMINATION: ICD-10-CM

## 2021-02-10 DIAGNOSIS — Z01.812 PRE-OPERATIVE LABORATORY EXAMINATION: ICD-10-CM

## 2021-02-10 LAB
ANION GAP SERPL CALC-SCNC: 10 MMOL/L (ref 7–16)
APTT PPP: 29 SEC (ref 24.7–36)
BASOPHILS # BLD AUTO: 0.8 % (ref 0–1.8)
BASOPHILS # BLD: 0.07 K/UL (ref 0–0.12)
BUN SERPL-MCNC: 13 MG/DL (ref 8–22)
CALCIUM SERPL-MCNC: 9.5 MG/DL (ref 8.5–10.5)
CHLORIDE SERPL-SCNC: 100 MMOL/L (ref 96–112)
CO2 SERPL-SCNC: 21 MMOL/L (ref 20–33)
CREAT SERPL-MCNC: 0.7 MG/DL (ref 0.5–1.4)
EKG IMPRESSION: NORMAL
EOSINOPHIL # BLD AUTO: 0.08 K/UL (ref 0–0.51)
EOSINOPHIL NFR BLD: 0.9 % (ref 0–6.9)
ERYTHROCYTE [DISTWIDTH] IN BLOOD BY AUTOMATED COUNT: 41.9 FL (ref 35.9–50)
GLUCOSE SERPL-MCNC: 120 MG/DL (ref 65–99)
HCT VFR BLD AUTO: 41.3 % (ref 37–47)
HGB BLD-MCNC: 14 G/DL (ref 12–16)
IMM GRANULOCYTES # BLD AUTO: 0.04 K/UL (ref 0–0.11)
IMM GRANULOCYTES NFR BLD AUTO: 0.5 % (ref 0–0.9)
INR PPP: 1.05 (ref 0.87–1.13)
LYMPHOCYTES # BLD AUTO: 2.29 K/UL (ref 1–4.8)
LYMPHOCYTES NFR BLD: 27.1 % (ref 22–41)
MCH RBC QN AUTO: 29.9 PG (ref 27–33)
MCHC RBC AUTO-ENTMCNC: 33.9 G/DL (ref 33.6–35)
MCV RBC AUTO: 88.2 FL (ref 81.4–97.8)
MONOCYTES # BLD AUTO: 0.55 K/UL (ref 0–0.85)
MONOCYTES NFR BLD AUTO: 6.5 % (ref 0–13.4)
NEUTROPHILS # BLD AUTO: 5.42 K/UL (ref 2–7.15)
NEUTROPHILS NFR BLD: 64.2 % (ref 44–72)
NRBC # BLD AUTO: 0 K/UL
NRBC BLD-RTO: 0 /100 WBC
PLATELET # BLD AUTO: 317 K/UL (ref 164–446)
PMV BLD AUTO: 10.3 FL (ref 9–12.9)
POTASSIUM SERPL-SCNC: 3.8 MMOL/L (ref 3.6–5.5)
PROTHROMBIN TIME: 14.1 SEC (ref 12–14.6)
RBC # BLD AUTO: 4.68 M/UL (ref 4.2–5.4)
SODIUM SERPL-SCNC: 131 MMOL/L (ref 135–145)
WBC # BLD AUTO: 8.5 K/UL (ref 4.8–10.8)

## 2021-02-10 PROCEDURE — 93005 ELECTROCARDIOGRAM TRACING: CPT

## 2021-02-10 PROCEDURE — 85730 THROMBOPLASTIN TIME PARTIAL: CPT

## 2021-02-10 PROCEDURE — 36415 COLL VENOUS BLD VENIPUNCTURE: CPT

## 2021-02-10 PROCEDURE — 71046 X-RAY EXAM CHEST 2 VIEWS: CPT

## 2021-02-10 PROCEDURE — 93010 ELECTROCARDIOGRAM REPORT: CPT | Performed by: INTERNAL MEDICINE

## 2021-02-10 PROCEDURE — 85025 COMPLETE CBC W/AUTO DIFF WBC: CPT

## 2021-02-10 PROCEDURE — 80048 BASIC METABOLIC PNL TOTAL CA: CPT

## 2021-02-10 PROCEDURE — 85610 PROTHROMBIN TIME: CPT

## 2021-02-10 ASSESSMENT — FIBROSIS 4 INDEX: FIB4 SCORE: 0.47

## 2021-02-12 ENCOUNTER — PRE-ADMISSION TESTING (OUTPATIENT)
Dept: ADMISSIONS | Facility: MEDICAL CENTER | Age: 36
End: 2021-02-12
Attending: NEUROLOGICAL SURGERY
Payer: COMMERCIAL

## 2021-02-12 DIAGNOSIS — Z01.812 PRE-OPERATIVE LABORATORY EXAMINATION: ICD-10-CM

## 2021-02-12 PROCEDURE — U0003 INFECTIOUS AGENT DETECTION BY NUCLEIC ACID (DNA OR RNA); SEVERE ACUTE RESPIRATORY SYNDROME CORONAVIRUS 2 (SARS-COV-2) (CORONAVIRUS DISEASE [COVID-19]), AMPLIFIED PROBE TECHNIQUE, MAKING USE OF HIGH THROUGHPUT TECHNOLOGIES AS DESCRIBED BY CMS-2020-01-R: HCPCS

## 2021-02-12 PROCEDURE — C9803 HOPD COVID-19 SPEC COLLECT: HCPCS

## 2021-02-12 PROCEDURE — U0005 INFEC AGEN DETEC AMPLI PROBE: HCPCS

## 2021-02-16 ENCOUNTER — TELEPHONE (OUTPATIENT)
Dept: PHYSICAL MEDICINE AND REHAB | Facility: MEDICAL CENTER | Age: 36
End: 2021-02-16

## 2021-02-16 NOTE — OR NURSING
COVID-19 Pre-surgery screening:    Left message to call back for screening, and advised of mask/visitor policies.

## 2021-02-17 ENCOUNTER — HOSPITAL ENCOUNTER (OUTPATIENT)
Facility: MEDICAL CENTER | Age: 36
End: 2021-02-17
Attending: NEUROLOGICAL SURGERY | Admitting: NEUROLOGICAL SURGERY
Payer: COMMERCIAL

## 2021-02-17 ENCOUNTER — APPOINTMENT (OUTPATIENT)
Dept: RADIOLOGY | Facility: MEDICAL CENTER | Age: 36
End: 2021-02-17
Attending: NEUROLOGICAL SURGERY
Payer: COMMERCIAL

## 2021-02-17 VITALS
SYSTOLIC BLOOD PRESSURE: 125 MMHG | BODY MASS INDEX: 23.28 KG/M2 | TEMPERATURE: 98.8 F | HEIGHT: 69 IN | WEIGHT: 157.19 LBS | HEART RATE: 95 BPM | RESPIRATION RATE: 17 BRPM | OXYGEN SATURATION: 99 % | DIASTOLIC BLOOD PRESSURE: 71 MMHG

## 2021-02-17 LAB
B-HCG SERPL-ACNC: ABNORMAL MIU/ML (ref 0–5)
HCG SERPL QL: POSITIVE
HCG UR QL: POSITIVE

## 2021-02-17 PROCEDURE — 700111 HCHG RX REV CODE 636 W/ 250 OVERRIDE (IP): Performed by: NEUROLOGICAL SURGERY

## 2021-02-17 PROCEDURE — A9270 NON-COVERED ITEM OR SERVICE: HCPCS | Performed by: NEUROLOGICAL SURGERY

## 2021-02-17 PROCEDURE — 84703 CHORIONIC GONADOTROPIN ASSAY: CPT

## 2021-02-17 PROCEDURE — 81025 URINE PREGNANCY TEST: CPT

## 2021-02-17 PROCEDURE — 36415 COLL VENOUS BLD VENIPUNCTURE: CPT

## 2021-02-17 PROCEDURE — 84702 CHORIONIC GONADOTROPIN TEST: CPT

## 2021-02-17 PROCEDURE — 700101 HCHG RX REV CODE 250: Performed by: NEUROLOGICAL SURGERY

## 2021-02-17 PROCEDURE — 700105 HCHG RX REV CODE 258: Performed by: NEUROLOGICAL SURGERY

## 2021-02-17 RX ORDER — SODIUM CHLORIDE, SODIUM LACTATE, POTASSIUM CHLORIDE, CALCIUM CHLORIDE 600; 310; 30; 20 MG/100ML; MG/100ML; MG/100ML; MG/100ML
INJECTION, SOLUTION INTRAVENOUS CONTINUOUS
Status: ACTIVE | OUTPATIENT
Start: 2021-02-17 | End: 2021-02-17

## 2021-02-17 RX ORDER — MORPHINE SULFATE 4 MG/ML
2 INJECTION, SOLUTION INTRAMUSCULAR; INTRAVENOUS
Status: COMPLETED | OUTPATIENT
Start: 2021-02-17 | End: 2021-02-17

## 2021-02-17 RX ADMIN — SODIUM CHLORIDE, POTASSIUM CHLORIDE, SODIUM LACTATE AND CALCIUM CHLORIDE: 600; 310; 30; 20 INJECTION, SOLUTION INTRAVENOUS at 06:52

## 2021-02-17 RX ADMIN — MORPHINE SULFATE 2 MG: 4 INJECTION INTRAVENOUS at 13:49

## 2021-02-17 RX ADMIN — POVIDONE IODINE 15 ML: 100 SOLUTION TOPICAL at 06:20

## 2021-02-17 RX ADMIN — LIDOCAINE HYDROCHLORIDE 0.5 ML: 10 INJECTION, SOLUTION EPIDURAL; INFILTRATION; INTRACAUDAL at 06:21

## 2021-02-17 ASSESSMENT — FIBROSIS 4 INDEX: FIB4 SCORE: 0.45

## 2021-02-17 NOTE — PROGRESS NOTES
35 year old arrived for surgery and pregnancy test was positive, quant and qualitative was drawn for confirmation. At this time was advised to see OBGYN and re-evaluate once more information is obtained. Explained risks of first trimester anesthesia to include but not limilted to increased risk of low birth weight, miscarrage, birth defects. Cancelled procedure  until pt can process and gather further information.

## 2021-02-17 NOTE — OR NURSING
Pt has + hcg. Dr. Corbett and Dr. Goodman aware and at bedside talking with the patient. Orders received for serum hcg quant/qual.  at bedside. Call light within reach.

## 2021-02-17 NOTE — OR NURSING
Assume care for pt in pre-op. Patient allergies and NPO status verified. Belongings secured. Patient verbalizes understanding of pain scale, expected course of stay and plan of care. Surgical site verified with patient. IV access established. urine sample sent to lab for hcg. Call light within reach. No further needs at this time. Hourly rounding in place.

## 2021-02-17 NOTE — OR NURSING
Procedure cancelled today per Dr. Corbett due to +hcg. Pt has an appointment tomorrow with her OB. PIV removed. Pt getting dressed at this time.  with patient.

## 2021-02-18 ENCOUNTER — HOSPITAL ENCOUNTER (OUTPATIENT)
Facility: MEDICAL CENTER | Age: 36
End: 2021-02-18
Attending: OBSTETRICS & GYNECOLOGY
Payer: COMMERCIAL

## 2021-02-18 LAB
ABO GROUP BLD: NORMAL
BASOPHILS # BLD AUTO: 0.6 % (ref 0–1.8)
BASOPHILS # BLD: 0.05 K/UL (ref 0–0.12)
BLD GP AB SCN SERPL QL: NORMAL
EOSINOPHIL # BLD AUTO: 0.09 K/UL (ref 0–0.51)
EOSINOPHIL NFR BLD: 1.1 % (ref 0–6.9)
ERYTHROCYTE [DISTWIDTH] IN BLOOD BY AUTOMATED COUNT: 43.5 FL (ref 35.9–50)
HBV SURFACE AG SER QL: ABNORMAL
HCT VFR BLD AUTO: 43.1 % (ref 37–47)
HGB BLD-MCNC: 14.4 G/DL (ref 12–16)
HIV 1+2 AB+HIV1 P24 AG SERPL QL IA: NORMAL
IMM GRANULOCYTES # BLD AUTO: 0.02 K/UL (ref 0–0.11)
IMM GRANULOCYTES NFR BLD AUTO: 0.3 % (ref 0–0.9)
LYMPHOCYTES # BLD AUTO: 1.72 K/UL (ref 1–4.8)
LYMPHOCYTES NFR BLD: 21.7 % (ref 22–41)
MCH RBC QN AUTO: 30.2 PG (ref 27–33)
MCHC RBC AUTO-ENTMCNC: 33.4 G/DL (ref 33.6–35)
MCV RBC AUTO: 90.4 FL (ref 81.4–97.8)
MONOCYTES # BLD AUTO: 0.51 K/UL (ref 0–0.85)
MONOCYTES NFR BLD AUTO: 6.4 % (ref 0–13.4)
NEUTROPHILS # BLD AUTO: 5.52 K/UL (ref 2–7.15)
NEUTROPHILS NFR BLD: 69.9 % (ref 44–72)
NRBC # BLD AUTO: 0 K/UL
NRBC BLD-RTO: 0 /100 WBC
PLATELET # BLD AUTO: 296 K/UL (ref 164–446)
PMV BLD AUTO: 9.8 FL (ref 9–12.9)
RBC # BLD AUTO: 4.77 M/UL (ref 4.2–5.4)
RH BLD: NORMAL
RUBV AB SER QL: 83.1 IU/ML
TREPONEMA PALLIDUM IGG+IGM AB [PRESENCE] IN SERUM OR PLASMA BY IMMUNOASSAY: ABNORMAL
WBC # BLD AUTO: 7.9 K/UL (ref 4.8–10.8)

## 2021-02-18 PROCEDURE — 87086 URINE CULTURE/COLONY COUNT: CPT

## 2021-02-18 PROCEDURE — 87389 HIV-1 AG W/HIV-1&-2 AB AG IA: CPT

## 2021-02-18 PROCEDURE — 81329 SMN1 GENE DOS/DELETION ALYS: CPT

## 2021-02-18 PROCEDURE — 86762 RUBELLA ANTIBODY: CPT

## 2021-02-18 PROCEDURE — 86850 RBC ANTIBODY SCREEN: CPT

## 2021-02-18 PROCEDURE — 86901 BLOOD TYPING SEROLOGIC RH(D): CPT

## 2021-02-18 PROCEDURE — 81220 CFTR GENE COM VARIANTS: CPT

## 2021-02-18 PROCEDURE — 81243 FMR1 GEN ALY DETC ABNL ALLEL: CPT

## 2021-02-18 PROCEDURE — 86900 BLOOD TYPING SEROLOGIC ABO: CPT

## 2021-02-18 PROCEDURE — 86780 TREPONEMA PALLIDUM: CPT

## 2021-02-18 PROCEDURE — 85025 COMPLETE CBC W/AUTO DIFF WBC: CPT

## 2021-02-18 PROCEDURE — 87340 HEPATITIS B SURFACE AG IA: CPT

## 2021-02-19 ENCOUNTER — HOSPITAL ENCOUNTER (OUTPATIENT)
Facility: MEDICAL CENTER | Age: 36
End: 2021-02-19
Attending: NEUROLOGICAL SURGERY | Admitting: NEUROLOGICAL SURGERY
Payer: COMMERCIAL

## 2021-02-19 ENCOUNTER — HOSPITAL ENCOUNTER (OUTPATIENT)
Dept: RADIOLOGY | Facility: MEDICAL CENTER | Age: 36
End: 2021-02-19
Attending: PHYSICIAN ASSISTANT
Payer: COMMERCIAL

## 2021-02-19 DIAGNOSIS — R22.40 LOCALIZED SWELLING, MASS, OR LUMP OF LOWER EXTREMITY, UNSPECIFIED LATERALITY: ICD-10-CM

## 2021-02-19 PROCEDURE — 93970 EXTREMITY STUDY: CPT

## 2021-02-20 LAB
BACTERIA UR CULT: NORMAL
SIGNIFICANT IND 70042: NORMAL
SITE SITE: NORMAL
SOURCE SOURCE: NORMAL

## 2021-02-22 ENCOUNTER — HOSPITAL ENCOUNTER (OUTPATIENT)
Facility: MEDICAL CENTER | Age: 36
End: 2021-02-24
Attending: EMERGENCY MEDICINE | Admitting: STUDENT IN AN ORGANIZED HEALTH CARE EDUCATION/TRAINING PROGRAM
Payer: COMMERCIAL

## 2021-02-22 ENCOUNTER — APPOINTMENT (OUTPATIENT)
Dept: RADIOLOGY | Facility: MEDICAL CENTER | Age: 36
End: 2021-02-22
Attending: EMERGENCY MEDICINE
Payer: COMMERCIAL

## 2021-02-22 ENCOUNTER — TELEPHONE (OUTPATIENT)
Dept: PHYSICAL MEDICINE AND REHAB | Facility: MEDICAL CENTER | Age: 36
End: 2021-02-22

## 2021-02-22 DIAGNOSIS — R52 INTRACTABLE PAIN: ICD-10-CM

## 2021-02-22 DIAGNOSIS — M54.16 LUMBAR RADICULOPATHY: ICD-10-CM

## 2021-02-22 PROBLEM — R82.71 BACTERIURIA WITH PYURIA: Status: ACTIVE | Noted: 2021-02-22

## 2021-02-22 PROBLEM — Z3A.08 8 WEEKS GESTATION OF PREGNANCY: Status: ACTIVE | Noted: 2021-02-22

## 2021-02-22 PROBLEM — M54.17 LUMBOSACRAL RADICULOPATHY: Status: ACTIVE | Noted: 2021-02-22

## 2021-02-22 PROBLEM — R82.81 BACTERIURIA WITH PYURIA: Status: ACTIVE | Noted: 2021-02-22

## 2021-02-22 LAB
ALBUMIN SERPL BCP-MCNC: 4.2 G/DL (ref 3.2–4.9)
ALBUMIN/GLOB SERPL: 1.8 G/DL
ALP SERPL-CCNC: 38 U/L (ref 30–99)
ALT SERPL-CCNC: 15 U/L (ref 2–50)
ANION GAP SERPL CALC-SCNC: 9 MMOL/L (ref 7–16)
APPEARANCE UR: CLEAR
AST SERPL-CCNC: 16 U/L (ref 12–45)
BACTERIA #/AREA URNS HPF: ABNORMAL /HPF
BASOPHILS # BLD AUTO: 0.4 % (ref 0–1.8)
BASOPHILS # BLD: 0.03 K/UL (ref 0–0.12)
BILIRUB SERPL-MCNC: 0.4 MG/DL (ref 0.1–1.5)
BILIRUB UR QL STRIP.AUTO: NEGATIVE
BUN SERPL-MCNC: 9 MG/DL (ref 8–22)
CALCIUM SERPL-MCNC: 9.5 MG/DL (ref 8.5–10.5)
CHLORIDE SERPL-SCNC: 105 MMOL/L (ref 96–112)
CO2 SERPL-SCNC: 22 MMOL/L (ref 20–33)
COLOR UR: YELLOW
CREAT SERPL-MCNC: 0.54 MG/DL (ref 0.5–1.4)
EKG IMPRESSION: NORMAL
EOSINOPHIL # BLD AUTO: 0.04 K/UL (ref 0–0.51)
EOSINOPHIL NFR BLD: 0.5 % (ref 0–6.9)
EPI CELLS #/AREA URNS HPF: ABNORMAL /HPF
ERYTHROCYTE [DISTWIDTH] IN BLOOD BY AUTOMATED COUNT: 39.9 FL (ref 35.9–50)
FLUAV RNA SPEC QL NAA+PROBE: NEGATIVE
FLUBV RNA SPEC QL NAA+PROBE: NEGATIVE
GLOBULIN SER CALC-MCNC: 2.4 G/DL (ref 1.9–3.5)
GLUCOSE SERPL-MCNC: 107 MG/DL (ref 65–99)
GLUCOSE UR STRIP.AUTO-MCNC: NEGATIVE MG/DL
HCG SERPL QL: POSITIVE
HCT VFR BLD AUTO: 40.4 % (ref 37–47)
HGB BLD-MCNC: 14.1 G/DL (ref 12–16)
HYALINE CASTS #/AREA URNS LPF: ABNORMAL /LPF
IMM GRANULOCYTES # BLD AUTO: 0.03 K/UL (ref 0–0.11)
IMM GRANULOCYTES NFR BLD AUTO: 0.4 % (ref 0–0.9)
KETONES UR STRIP.AUTO-MCNC: 15 MG/DL
LACTATE BLD-SCNC: 1.6 MMOL/L (ref 0.5–2)
LEUKOCYTE ESTERASE UR QL STRIP.AUTO: ABNORMAL
LYMPHOCYTES # BLD AUTO: 1.34 K/UL (ref 1–4.8)
LYMPHOCYTES NFR BLD: 17.8 % (ref 22–41)
MCH RBC QN AUTO: 30.2 PG (ref 27–33)
MCHC RBC AUTO-ENTMCNC: 34.9 G/DL (ref 33.6–35)
MCV RBC AUTO: 86.5 FL (ref 81.4–97.8)
MICRO URNS: ABNORMAL
MONOCYTES # BLD AUTO: 0.45 K/UL (ref 0–0.85)
MONOCYTES NFR BLD AUTO: 6 % (ref 0–13.4)
NEUTROPHILS # BLD AUTO: 5.63 K/UL (ref 2–7.15)
NEUTROPHILS NFR BLD: 74.9 % (ref 44–72)
NITRITE UR QL STRIP.AUTO: NEGATIVE
NRBC # BLD AUTO: 0 K/UL
NRBC BLD-RTO: 0 /100 WBC
PH UR STRIP.AUTO: 6 [PH] (ref 5–8)
PLATELET # BLD AUTO: 287 K/UL (ref 164–446)
PMV BLD AUTO: 9.6 FL (ref 9–12.9)
POTASSIUM SERPL-SCNC: 3.7 MMOL/L (ref 3.6–5.5)
PROT SERPL-MCNC: 6.6 G/DL (ref 6–8.2)
PROT UR QL STRIP: NEGATIVE MG/DL
RBC # BLD AUTO: 4.67 M/UL (ref 4.2–5.4)
RBC # URNS HPF: ABNORMAL /HPF
RBC UR QL AUTO: ABNORMAL
RSV RNA SPEC QL NAA+PROBE: NEGATIVE
SARS-COV-2 RNA RESP QL NAA+PROBE: NOTDETECTED
SODIUM SERPL-SCNC: 136 MMOL/L (ref 135–145)
SP GR UR STRIP.AUTO: 1.01
SPECIMEN SOURCE: NORMAL
UROBILINOGEN UR STRIP.AUTO-MCNC: 0.2 MG/DL
WBC # BLD AUTO: 7.5 K/UL (ref 4.8–10.8)
WBC #/AREA URNS HPF: ABNORMAL /HPF

## 2021-02-22 PROCEDURE — 700111 HCHG RX REV CODE 636 W/ 250 OVERRIDE (IP): Performed by: STUDENT IN AN ORGANIZED HEALTH CARE EDUCATION/TRAINING PROGRAM

## 2021-02-22 PROCEDURE — 0241U HCHG SARS-COV-2 COVID-19 NFCT DS RESP RNA 4 TRGT MIC: CPT

## 2021-02-22 PROCEDURE — 700105 HCHG RX REV CODE 258: Performed by: EMERGENCY MEDICINE

## 2021-02-22 PROCEDURE — 96372 THER/PROPH/DIAG INJ SC/IM: CPT

## 2021-02-22 PROCEDURE — 84703 CHORIONIC GONADOTROPIN ASSAY: CPT

## 2021-02-22 PROCEDURE — C9803 HOPD COVID-19 SPEC COLLECT: HCPCS | Performed by: STUDENT IN AN ORGANIZED HEALTH CARE EDUCATION/TRAINING PROGRAM

## 2021-02-22 PROCEDURE — 96376 TX/PRO/DX INJ SAME DRUG ADON: CPT

## 2021-02-22 PROCEDURE — 99220 PR INITIAL OBSERVATION CARE,LEVL III: CPT | Performed by: STUDENT IN AN ORGANIZED HEALTH CARE EDUCATION/TRAINING PROGRAM

## 2021-02-22 PROCEDURE — 83605 ASSAY OF LACTIC ACID: CPT

## 2021-02-22 PROCEDURE — 700111 HCHG RX REV CODE 636 W/ 250 OVERRIDE (IP): Performed by: EMERGENCY MEDICINE

## 2021-02-22 PROCEDURE — 93005 ELECTROCARDIOGRAM TRACING: CPT

## 2021-02-22 PROCEDURE — 81001 URINALYSIS AUTO W/SCOPE: CPT

## 2021-02-22 PROCEDURE — 99285 EMERGENCY DEPT VISIT HI MDM: CPT

## 2021-02-22 PROCEDURE — 85025 COMPLETE CBC W/AUTO DIFF WBC: CPT

## 2021-02-22 PROCEDURE — G0378 HOSPITAL OBSERVATION PER HR: HCPCS

## 2021-02-22 PROCEDURE — 72148 MRI LUMBAR SPINE W/O DYE: CPT

## 2021-02-22 PROCEDURE — 93005 ELECTROCARDIOGRAM TRACING: CPT | Performed by: EMERGENCY MEDICINE

## 2021-02-22 PROCEDURE — 700102 HCHG RX REV CODE 250 W/ 637 OVERRIDE(OP): Performed by: STUDENT IN AN ORGANIZED HEALTH CARE EDUCATION/TRAINING PROGRAM

## 2021-02-22 PROCEDURE — 96374 THER/PROPH/DIAG INJ IV PUSH: CPT

## 2021-02-22 PROCEDURE — A9270 NON-COVERED ITEM OR SERVICE: HCPCS | Performed by: STUDENT IN AN ORGANIZED HEALTH CARE EDUCATION/TRAINING PROGRAM

## 2021-02-22 PROCEDURE — 96367 TX/PROPH/DG ADDL SEQ IV INF: CPT

## 2021-02-22 PROCEDURE — 80053 COMPREHEN METABOLIC PANEL: CPT

## 2021-02-22 PROCEDURE — 96375 TX/PRO/DX INJ NEW DRUG ADDON: CPT

## 2021-02-22 PROCEDURE — 700105 HCHG RX REV CODE 258: Performed by: STUDENT IN AN ORGANIZED HEALTH CARE EDUCATION/TRAINING PROGRAM

## 2021-02-22 RX ORDER — CYCLOBENZAPRINE HCL 5 MG
5-10 TABLET ORAL 3 TIMES DAILY PRN
COMMUNITY
End: 2021-11-29

## 2021-02-22 RX ORDER — MORPHINE SULFATE 4 MG/ML
1 INJECTION, SOLUTION INTRAMUSCULAR; INTRAVENOUS
Status: DISCONTINUED | OUTPATIENT
Start: 2021-02-22 | End: 2021-02-22

## 2021-02-22 RX ORDER — ONDANSETRON 2 MG/ML
4 INJECTION INTRAMUSCULAR; INTRAVENOUS ONCE
Status: COMPLETED | OUTPATIENT
Start: 2021-02-22 | End: 2021-02-22

## 2021-02-22 RX ORDER — SODIUM CHLORIDE 9 MG/ML
1000 INJECTION, SOLUTION INTRAVENOUS ONCE
Status: COMPLETED | OUTPATIENT
Start: 2021-02-22 | End: 2021-02-22

## 2021-02-22 RX ORDER — VITAMIN A ACETATE, BETA CAROTENE, ASCORBIC ACID, CHOLECALCIFEROL, .ALPHA.-TOCOPHEROL ACETATE, DL-, THIAMINE MONONITRATE, RIBOFLAVIN, NIACINAMIDE, PYRIDOXINE HYDROCHLORIDE, FOLIC ACID, CYANOCOBALAMIN, CALCIUM CARBONATE, FERROUS FUMARATE, ZINC OXIDE, CUPRIC OXIDE 3080; 12; 120; 400; 1; 1.84; 3; 20; 22; 920; 25; 200; 27; 10; 2 [IU]/1; UG/1; MG/1; [IU]/1; MG/1; MG/1; MG/1; MG/1; MG/1; [IU]/1; MG/1; MG/1; MG/1; MG/1; MG/1
1 TABLET, FILM COATED ORAL
COMMUNITY
End: 2022-02-28

## 2021-02-22 RX ORDER — HEPARIN SODIUM 5000 [USP'U]/ML
5000 INJECTION, SOLUTION INTRAVENOUS; SUBCUTANEOUS EVERY 8 HOURS
Status: DISCONTINUED | OUTPATIENT
Start: 2021-02-22 | End: 2021-02-24 | Stop reason: HOSPADM

## 2021-02-22 RX ORDER — GABAPENTIN 300 MG/1
900 CAPSULE ORAL 3 TIMES DAILY PRN
Status: DISCONTINUED | OUTPATIENT
Start: 2021-02-22 | End: 2021-02-24 | Stop reason: HOSPADM

## 2021-02-22 RX ORDER — LACOSAMIDE 100 MG/1
200 TABLET ORAL 2 TIMES DAILY
Status: DISCONTINUED | OUTPATIENT
Start: 2021-02-22 | End: 2021-02-24 | Stop reason: HOSPADM

## 2021-02-22 RX ORDER — OXYCODONE HYDROCHLORIDE AND ACETAMINOPHEN 5; 325 MG/1; MG/1
1 TABLET ORAL EVERY 4 HOURS PRN
COMMUNITY
End: 2021-06-01

## 2021-02-22 RX ORDER — AMOXICILLIN 250 MG
2 CAPSULE ORAL 2 TIMES DAILY
Status: DISCONTINUED | OUTPATIENT
Start: 2021-02-22 | End: 2021-02-24 | Stop reason: HOSPADM

## 2021-02-22 RX ORDER — LABETALOL HYDROCHLORIDE 5 MG/ML
10 INJECTION, SOLUTION INTRAVENOUS EVERY 4 HOURS PRN
Status: DISCONTINUED | OUTPATIENT
Start: 2021-02-22 | End: 2021-02-24 | Stop reason: HOSPADM

## 2021-02-22 RX ORDER — HYDROMORPHONE HYDROCHLORIDE 1 MG/ML
0.5 INJECTION, SOLUTION INTRAMUSCULAR; INTRAVENOUS; SUBCUTANEOUS ONCE
Status: COMPLETED | OUTPATIENT
Start: 2021-02-22 | End: 2021-02-22

## 2021-02-22 RX ORDER — OXYCODONE HYDROCHLORIDE 5 MG/1
5 TABLET ORAL EVERY 4 HOURS PRN
Status: DISCONTINUED | OUTPATIENT
Start: 2021-02-22 | End: 2021-02-23

## 2021-02-22 RX ORDER — METHYLPREDNISOLONE SODIUM SUCCINATE 125 MG/2ML
62.5 INJECTION, POWDER, LYOPHILIZED, FOR SOLUTION INTRAMUSCULAR; INTRAVENOUS ONCE
Status: COMPLETED | OUTPATIENT
Start: 2021-02-22 | End: 2021-02-22

## 2021-02-22 RX ORDER — BISACODYL 10 MG
10 SUPPOSITORY, RECTAL RECTAL
Status: DISCONTINUED | OUTPATIENT
Start: 2021-02-22 | End: 2021-02-24 | Stop reason: HOSPADM

## 2021-02-22 RX ORDER — ZOLPIDEM TARTRATE 5 MG/1
10 TABLET ORAL NIGHTLY PRN
Status: DISCONTINUED | OUTPATIENT
Start: 2021-02-22 | End: 2021-02-24 | Stop reason: HOSPADM

## 2021-02-22 RX ORDER — TIZANIDINE 4 MG/1
4 TABLET ORAL EVERY 6 HOURS PRN
Status: DISCONTINUED | OUTPATIENT
Start: 2021-02-22 | End: 2021-02-24 | Stop reason: HOSPADM

## 2021-02-22 RX ORDER — ACETAMINOPHEN 325 MG/1
650 TABLET ORAL EVERY 6 HOURS PRN
Status: DISCONTINUED | OUTPATIENT
Start: 2021-02-22 | End: 2021-02-24 | Stop reason: HOSPADM

## 2021-02-22 RX ORDER — MORPHINE SULFATE 4 MG/ML
2 INJECTION, SOLUTION INTRAMUSCULAR; INTRAVENOUS
Status: DISCONTINUED | OUTPATIENT
Start: 2021-02-22 | End: 2021-02-23

## 2021-02-22 RX ORDER — ONDANSETRON 2 MG/ML
4 INJECTION INTRAMUSCULAR; INTRAVENOUS ONCE
Status: ACTIVE | OUTPATIENT
Start: 2021-02-22 | End: 2021-02-23

## 2021-02-22 RX ORDER — POLYETHYLENE GLYCOL 3350 17 G/17G
1 POWDER, FOR SOLUTION ORAL
Status: DISCONTINUED | OUTPATIENT
Start: 2021-02-22 | End: 2021-02-24 | Stop reason: HOSPADM

## 2021-02-22 RX ADMIN — HYDROMORPHONE HYDROCHLORIDE 0.5 MG: 1 INJECTION, SOLUTION INTRAMUSCULAR; INTRAVENOUS; SUBCUTANEOUS at 11:32

## 2021-02-22 RX ADMIN — CEFTRIAXONE SODIUM 1 G: 1 INJECTION, POWDER, FOR SOLUTION INTRAMUSCULAR; INTRAVENOUS at 20:20

## 2021-02-22 RX ADMIN — MORPHINE SULFATE 2 MG: 4 INJECTION INTRAVENOUS at 15:14

## 2021-02-22 RX ADMIN — SODIUM CHLORIDE 1000 ML: 9 INJECTION, SOLUTION INTRAVENOUS at 11:43

## 2021-02-22 RX ADMIN — TIZANIDINE 4 MG: 4 TABLET ORAL at 20:18

## 2021-02-22 RX ADMIN — ACETAMINOPHEN 650 MG: 325 TABLET ORAL at 17:21

## 2021-02-22 RX ADMIN — OXYCODONE 5 MG: 5 TABLET ORAL at 20:17

## 2021-02-22 RX ADMIN — GABAPENTIN 900 MG: 300 CAPSULE ORAL at 20:17

## 2021-02-22 RX ADMIN — MORPHINE SULFATE 2 MG: 4 INJECTION INTRAVENOUS at 18:26

## 2021-02-22 RX ADMIN — LACOSAMIDE 200 MG: 100 TABLET, FILM COATED ORAL at 17:21

## 2021-02-22 RX ADMIN — HYDROMORPHONE HYDROCHLORIDE 0.5 MG: 1 INJECTION, SOLUTION INTRAMUSCULAR; INTRAVENOUS; SUBCUTANEOUS at 13:48

## 2021-02-22 RX ADMIN — METHYLPREDNISOLONE SODIUM SUCCINATE 62.5 MG: 125 INJECTION, POWDER, FOR SOLUTION INTRAMUSCULAR; INTRAVENOUS at 15:13

## 2021-02-22 RX ADMIN — MORPHINE SULFATE 2 MG: 4 INJECTION INTRAVENOUS at 22:03

## 2021-02-22 RX ADMIN — DOCUSATE SODIUM 50 MG AND SENNOSIDES 8.6 MG 2 TABLET: 8.6; 5 TABLET, FILM COATED ORAL at 17:22

## 2021-02-22 RX ADMIN — OXYCODONE 5 MG: 5 TABLET ORAL at 15:14

## 2021-02-22 RX ADMIN — ONDANSETRON 4 MG: 2 INJECTION INTRAMUSCULAR; INTRAVENOUS at 11:32

## 2021-02-22 ASSESSMENT — ENCOUNTER SYMPTOMS
DEPRESSION: 0
FLANK PAIN: 0
SENSORY CHANGE: 1
MYALGIAS: 0
DOUBLE VISION: 0
WEAKNESS: 0
DIZZINESS: 0
SHORTNESS OF BREATH: 0
VOMITING: 0
SEIZURES: 1
HEADACHES: 1
TINGLING: 1
CHILLS: 0
FOCAL WEAKNESS: 1
ABDOMINAL PAIN: 0
FEVER: 0
NAUSEA: 0
BACK PAIN: 1
SORE THROAT: 0
DIARRHEA: 0
NERVOUS/ANXIOUS: 1
COUGH: 0
BLURRED VISION: 0

## 2021-02-22 ASSESSMENT — FIBROSIS 4 INDEX: FIB4 SCORE: 0.48

## 2021-02-22 ASSESSMENT — PAIN DESCRIPTION - PAIN TYPE
TYPE: ACUTE PAIN
TYPE: ACUTE PAIN

## 2021-02-22 NOTE — ED PROVIDER NOTES
ED Provider Note    Scribed for Toni Nair M.D. by Paola Wallace. 2/22/2021, 11:00 AM.    Primary care provider: SONIA Paredes  Means of arrival: Walk In  History obtained from: Patient  History limited by: None    CHIEF COMPLAINT  Chief Complaint   Patient presents with   • Back Pain     PPE Note: I personally donned full PPE for all patient encounters during this visit, including wearing an N95 respirator mask, gloves, and eye protection. Scribe remained outside the patient's room and did not have any contact with the patient for the duration of patient encounter.     HPI  Roxanna Guzmán is a 35 y.o. female who presents to the Emergency Department for evaluation of worsening moderate to severe back pain. Patient states that she has a herniated disk at L4-S1 as a result of an injury on November 2020 and was scheduled to have surgery last Wednesday with Dr. Corbett. Patient states that this morning, a pain which radiates from above her pubic symphisis down her left leg developed. Patient reports associated left leg weakness and numbness. Patient also states that she had difficulty sleeping and urinaring secondary to the pain. Patient denies any urinary incontinence, fever, dysuria, or bowel incontinence. Patient took percocet last night which provided no alleviation. Patient also takes Gabapentin and Flexeril. Patient is currently pregnant, and a recent US was normal. Patient denies any recent falls or trauma. Patient has a history of migraines and complex partial seizures    REVIEW OF SYSTEMS  Pertinent positives include back pain, left leg weakness and numbness, difficulty with urination. Pertinent negatives include no  urinary incontinence, fever, dysuria, or bowel incontinence.  All other systems reviewed and negative.    PAST MEDICAL HISTORY   has a past medical history of Anesthesia (02/10/2021), Anxiety, Depression, Head ache, Migraine, Seizure disorder, complex partial (HCC) (02/10/2021),  and TMJ arthropathy.    SURGICAL HISTORY   has a past surgical history that includes hernia repair; blepharoplasty (Bilateral, 2019); leep (2003); lipoma excision; and lumbar transforaminal epidural steroid injection (Bilateral, 2/1/2021).    SOCIAL HISTORY  Social History     Tobacco Use   • Smoking status: Never Smoker   • Smokeless tobacco: Never Used   Substance Use Topics   • Alcohol use: Not Currently   • Drug use: Not Currently     Types: Oral     Comment: marijuana edibles      Social History     Substance and Sexual Activity   Drug Use Not Currently   • Types: Oral    Comment: marijuana edibles       FAMILY HISTORY  Family History   Problem Relation Age of Onset   • Hypertension Mother    • Cancer Father         prostate cancer   • Depression Father    • Genetic Disorder Father         Unknown Neuromusk disease   • Hypertension Maternal Grandmother    • Hyperlipidemia Maternal Grandmother    • Genetic Disorder Brother         Unknown Neuromusk disease   • Genetic Disorder Paternal Aunt         Unknown Neuromusk disease   • Genetic Disorder Paternal Grandmother         Unknown Neuromusk disease       CURRENT MEDICATIONS  Current Outpatient Medications   Medication Instructions   • ALPRAZolam (XANAX) 2 mg, Oral, NIGHTLY PRN   • clonazePAM (KLONOPIN) 1 mg, Oral, EVERY BEDTIME PRN   • Emgality 120 mg, Subcutaneous, EVERY 30 DAYS   • fluticasone (FLONASE) 50 mcg, Nasal, DAILY   • gabapentin (NEURONTIN) 900 mg, Oral, 3 TIMES DAILY PRN   • HYDROcodone-acetaminophen (NORCO) 5-325 MG Tab per tablet 1 tablet, Oral, EVERY 8 HOURS PRN   • lacosamide (VIMPAT) 200 mg, Oral, 2 TIMES DAILY   • ONABOTULINUMTOXINA INJ Intramuscular, EVERY 3 MONTHS   • rizatriptan (MAXALT-MLT) 10 MG disintegrating tablet Take 1/2-1 tablet po at onset of headache, may repeat dose in 2 hours if unrelieved.  Do not exceed more than 2 tablets in 24 hours.   • tizanidine (ZANAFLEX) 8 mg, Oral, EVERY BEDTIME PRN   • zolpidem (AMBIEN) 10 mg, Oral,  "NIGHTLY PRN        ALLERGIES  No Known Allergies    PHYSICAL EXAM  VITAL SIGNS: BP (!) 163/99   Pulse (!) 152   Temp 36.7 °C (98 °F) (Temporal)   Resp (!) 24   Ht 1.753 m (5' 9\")   Wt 71.2 kg (157 lb)   LMP 02/10/2021 (Exact Date)   SpO2 97%   BMI 23.18 kg/m²     Constitutional: Well developed, Well nourished, moderate to severe distress, Non-toxic appearance.   HENT: Normocephalic, Atraumatic,  mucous membranes moist, no erythema, exudates, swelling, or masses, nares patent  Eyes: nonicteric  Neck: Supple, no meningismus  Lymphatic: No lymphadenopathy noted.   Cardiovascular: Tachycardic, no gallops rubs or murmurs  Lungs: Clear bilaterally   Abdomen: Bowel sounds normal, Soft, No tenderness  Skin: Warm, Dry, no rash  Back: Nontender in midline TS spine,  ,No tenderness, No CVA tenderness.   Genitalia: Deferred  Rectal: Deferred  Extremities: Strong DP pulses, No edema  Neurologic: 4+/5 strength to left lower extremity, Symmetric DTR, Subjective sensory changed to left lower extremity, Alert, appropriate, follows commands, moving all extremities, normal speech   Psychiatric: Affect normal    DIAGNOSTIC STUDIES / PROCEDURES    LABS  Results for orders placed or performed during the hospital encounter of 02/22/21   CBC WITH DIFFERENTIAL   Result Value Ref Range    WBC 7.5 4.8 - 10.8 K/uL    RBC 4.67 4.20 - 5.40 M/uL    Hemoglobin 14.1 12.0 - 16.0 g/dL    Hematocrit 40.4 37.0 - 47.0 %    MCV 86.5 81.4 - 97.8 fL    MCH 30.2 27.0 - 33.0 pg    MCHC 34.9 33.6 - 35.0 g/dL    RDW 39.9 35.9 - 50.0 fL    Platelet Count 287 164 - 446 K/uL    MPV 9.6 9.0 - 12.9 fL    Neutrophils-Polys 74.90 (H) 44.00 - 72.00 %    Lymphocytes 17.80 (L) 22.00 - 41.00 %    Monocytes 6.00 0.00 - 13.40 %    Eosinophils 0.50 0.00 - 6.90 %    Basophils 0.40 0.00 - 1.80 %    Immature Granulocytes 0.40 0.00 - 0.90 %    Nucleated RBC 0.00 /100 WBC    Neutrophils (Absolute) 5.63 2.00 - 7.15 K/uL    Lymphs (Absolute) 1.34 1.00 - 4.80 K/uL    Monos " (Absolute) 0.45 0.00 - 0.85 K/uL    Eos (Absolute) 0.04 0.00 - 0.51 K/uL    Baso (Absolute) 0.03 0.00 - 0.12 K/uL    Immature Granulocytes (abs) 0.03 0.00 - 0.11 K/uL    NRBC (Absolute) 0.00 K/uL   COMP METABOLIC PANEL   Result Value Ref Range    Sodium 136 135 - 145 mmol/L    Potassium 3.7 3.6 - 5.5 mmol/L    Chloride 105 96 - 112 mmol/L    Co2 22 20 - 33 mmol/L    Anion Gap 9.0 7.0 - 16.0    Glucose 107 (H) 65 - 99 mg/dL    Bun 9 8 - 22 mg/dL    Creatinine 0.54 0.50 - 1.40 mg/dL    Calcium 9.5 8.5 - 10.5 mg/dL    AST(SGOT) 16 12 - 45 U/L    ALT(SGPT) 15 2 - 50 U/L    Alkaline Phosphatase 38 30 - 99 U/L    Total Bilirubin 0.4 0.1 - 1.5 mg/dL    Albumin 4.2 3.2 - 4.9 g/dL    Total Protein 6.6 6.0 - 8.2 g/dL    Globulin 2.4 1.9 - 3.5 g/dL    A-G Ratio 1.8 g/dL   HCG QUAL SERUM   Result Value Ref Range    Beta-Hcg Qualitative Serum Positive (A) Negative   LACTIC ACID   Result Value Ref Range    Lactic Acid 1.6 0.5 - 2.0 mmol/L   URINALYSIS (UA)    Specimen: Urine   Result Value Ref Range    Color Yellow     Character Clear     Specific Gravity 1.010 <1.035    Ph 6.0 5.0 - 8.0    Glucose Negative Negative mg/dL    Ketones 15 (A) Negative mg/dL    Protein Negative Negative mg/dL    Bilirubin Negative Negative    Urobilinogen, Urine 0.2 Negative    Nitrite Negative Negative    Leukocyte Esterase Trace (A) Negative    Occult Blood Small (A) Negative    Micro Urine Req Microscopic    URINE MICROSCOPIC (W/UA)   Result Value Ref Range    WBC 20-50 (A) /hpf    RBC 0-2 /hpf    Bacteria Few (A) None /hpf    Epithelial Cells Few /hpf    Hyaline Cast 6-10 (A) /lpf   ESTIMATED GFR   Result Value Ref Range    GFR If African American >60 >60 mL/min/1.73 m 2    GFR If Non African American >60 >60 mL/min/1.73 m 2   EKG (NOW)   Result Value Ref Range    Report       [facility] Emergency Dept.    Test Date:  2021-02-22  Pt Name:    MURIEL AZAR                   Department: ER  MRN:        [id number]                       Room:  Gender:     Female                       Technician: [id number]  :        1985                   Requested By:ER TRIAGE PROTOCOL  Order #:    [id number]                    Reading MD:    Measurements  Intervals                                Axis  Rate:       116                          P:          60  IN:         152                          QRS:        74  QRSD:       88                           T:          13  QT:         324  QTc:        451    Interpretive Statements  SINUS TACHYCARDIA  MINIMAL ST DEPRESSION, INFERIOR LEADS  Compared to ECG 02/10/2021 14:18:40  ST (T wave) deviation now present  Sinus rhythm no longer present         All labs reviewed by me.     RADIOLOGY  MR-LUMBAR SPINE-W/O    (Results Pending)     The radiologist's interpretation of all radiological studies have been reviewed by me.    COURSE & MEDICAL DECISION MAKING  Nursing notes, VS, PMSFHx reviewed in chart.     11:00 AM Patient seen and examined at bedside. Ordered for EKG, CBC w/ diff, CP, HCG Qual serum, Lactic Acid, and UA to evaluate. Patient was treated with NS infusion 1,000 mL, Dilaudid .5 mg injection, and Zofran 4 mg for her symptoms.     11:59 AM - Spoke with Dr. Goode, Neurology, about the patient's condition. Ordered MRI. Patient will be admitted.    HYDRATION: Based on the patient's presentation of Tachycardia the patient was given IV fluids. IV Hydration was used because oral hydration was not adequate alone. Upon recheck following hydration, the patient was improved.    12:30 PM - I reevaluated the patient at bedside. The patient informs me they feel improved following Dilaudid administration. I discussed the patient's diagnostic study results. I informed the patient of my plan to admit today given the patient's current presentation and diagnostic study results. Patient verbalizes understanding and support with my plan for admission.      Decision Making:  This is a 35 y.o. year old female who presents with  intractable back pain.  She has known herniated disc in her lower lumbar spine and has weakness on that side.  She has had multiple injections this year through her neurosurgeons office.  She incidentally was found to be pregnant and she is 8 weeks at this time.  She has a normal intrauterine pregnancy per Dr. Villavicencio who the patient follows with for OB/GYN.  The patient essentially is already on gabapentin and opiates at home and has intractable pain.  She presented very uncomfortable with a heart rate in the 150 range.  At this point she appears to have intractable pain and cannot go home.  Her boyfriend is carrying her from place to place at home if she cannot walk.  I discussed the case with Dr. Goode who is on-call for Dr. Corbett, her neurosurgeon.  He requested a stat MRI as well as pain management involvement.  This was discussed with the hospitalist.  The patient will be admitted for pain control, further evaluation through neurosurgery.    DISPOSITION:  Patient will be hospitalized by Dr. Garza  in guarded condition.     FINAL IMPRESSION  1. Lumbar radiculopathy    2. Intractable pain          Paola TRACY (Sergio), am scribing for, and in the presence of, Toni Nair M.D..    Electronically signed by: Paola Wallace (Sergio), 2/22/2021    Toni TRACY M.D. personally performed the services described in this documentation, as scribed by Paola Wallace in my presence, and it is both accurate and complete.    The note accurately reflects work and decisions made by me.  Toni Nair M.D.  2/22/2021  12:33 PM

## 2021-02-22 NOTE — ED NOTES
IR called regarding procedure. Per IR the plan is to have Logan Moe do the procedure tommorow. Dr. Bell updated.

## 2021-02-22 NOTE — ASSESSMENT & PLAN NOTE
-Failed outpatient conservative management.  -Neurosurgery on board, plan for lumbar laminectomy with discectomy L4-5 on 2/24.  -IR consulted, being scheduled for transforaminal L5 nerve block, hopefully can be done today.  -Continue pain control with Neurontin, Zanaflex, along with oxycodone and morphine.  -PT/OT postoperatively.

## 2021-02-22 NOTE — TELEPHONE ENCOUNTER
----- Message from Aldair Segovia M.D. sent at 2/22/2021  7:58 AM PST -----  Can you check on Roxanna to see how she is doing and how her appointment with OB went.     Dr Segovia

## 2021-02-22 NOTE — ED NOTES
Pt lying prone on stretcher, crying with pain. Lower back pain with decreased sensation to left leg.  at bedside. ST in monitor. Warm blankets given.

## 2021-02-22 NOTE — ED TRIAGE NOTES
Pt BIB family with c/c of back pain. Pt was suppose to have back surgery however had a positive pregnancy test. Surgery rescheduled until 3/1. Pt unable to tolerate the pain.

## 2021-02-22 NOTE — ASSESSMENT & PLAN NOTE
- Hx of partial complex seizure disorder, well controlled per patient   - continue home anti-epileptics.   - seizure precautions.

## 2021-02-22 NOTE — PROGRESS NOTES
MRI demonstrates left L4/5 disk herniation with foraminal stenosis.  Recommend left L4/5 diskectomy; office is looking for time in OR; currently earliest OR time is Wednesday am.

## 2021-02-22 NOTE — ED NOTES
Pt states has no metal on self . Pt refusing gown states hurts to bad to move. Pt has earrings in ears bi-lat.( they dont come out.) had MRI previous with no difficulty with earrings in)

## 2021-02-22 NOTE — ASSESSMENT & PLAN NOTE
- Pt is 8 weeks pregnant, stable   - avoid teratogenic medications   - supportive care.  Start eran multivitamins.  -Outpatient follow-up with OB.

## 2021-02-22 NOTE — ED NOTES
Med rec updated and complete. Allergies reviewed. Pt denies antibiotic use in last 14 days.      Home pharmacy [facility]

## 2021-02-22 NOTE — H&P
Hospital Medicine History & Physical Note    Date of Service  2/22/2021    Primary Care Physician  SONIA Paredes    Consultants  Neurosurgery- Goode    Code Status  Full Code    Chief Complaint  Chief Complaint   Patient presents with   • Back Pain       History of Presenting Illness  35 y.o. female with past medical history of partial complex seizure, chronic migraine who presented 2/22/2021 with acute worsening of low back pain with left leg radiculopathy.     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 Corbett last week however this was postponed due to the patient becoming pregnant. She did see OBGYN for clearance, she is 8 weeks pregnancy and she had no abnormalities on US, she was given information regarding risks of surgery but was told it was okay to do so, she had elected to go through with surgery which is scheduled for March 1, 2021. However her pain has continued to worsen and she is unable to ambulate independently due to this. She states that saturday she did have a great day and was able to stretch and take her dog for a walk however Sunday pain was increased significantly and this morning the pain wrapped around into her pelvis and pubic region and was associated with some tingling thus she presented. She denies bowel or bladder incontinence but does have to strain to get her urine stream initiated which is new.   Her  who is present states she has been unable to work for the last 1 month and he is having to carry her around to use the restroom because she cannot walk.     Patient has had 2 epidural/spinal blocks, she states the most recent was done on February 1, and was successful for about 1-2 days but them pain removed. She has also had a medrol dose pack between these blocks which did not have much effect.     On arrival patient is very uncomfortable, lying prone is the only  position in which she finds some comfort.   She is afebrile, tachycardic with , RR 24, /99, saturating well on RA.   Labs are within normal limits, pregnancy test is confirmed positive.   UA does show ketones, small occult blood, trace LE, WBC 20-50 with few bacteria.       Review of Systems  Review of Systems   Constitutional: Positive for malaise/fatigue. Negative for chills and fever.   HENT: Negative for congestion and sore throat.    Eyes: Negative for blurred vision and double vision.   Respiratory: Negative for cough and shortness of breath.    Cardiovascular: Negative for chest pain and leg swelling.   Gastrointestinal: Negative for abdominal pain, diarrhea, nausea and vomiting.   Genitourinary: Negative for dysuria, flank pain and urgency.        Urinary Hesitancy    Musculoskeletal: Positive for back pain. Negative for myalgias.        Left leg/calf pain, pain now In pubic region   Neurological: Positive for tingling, sensory change, focal weakness, seizures and headaches. Negative for dizziness and weakness.   Psychiatric/Behavioral: Negative for depression. The patient is nervous/anxious.        Past Medical History   has a past medical history of Anesthesia (02/10/2021), Anxiety, Depression, Head ache, Migraine, Seizure disorder, complex partial (HCC) (02/10/2021), and TMJ arthropathy.    Surgical History   has a past surgical history that includes hernia repair; blepharoplasty (Bilateral, 2019); leep (2003); lipoma excision; and lumbar transforaminal epidural steroid injection (Bilateral, 2/1/2021).     Family History  family history includes Cancer in her father; Depression in her father; Genetic Disorder in her brother, father, paternal aunt, and paternal grandmother; Hyperlipidemia in her maternal grandmother; Hypertension in her maternal grandmother and mother.     Social History   reports that she has never smoked. She has never used smokeless tobacco. She reports previous alcohol use.  She reports previous drug use. Drug: Oral.    Allergies  No Known Allergies    Medications  Prior to Admission Medications   Prescriptions Last Dose Informant Patient Reported? Taking?   ALPRAZolam (XANAX) 1 MG Tab  Patient Yes No   Sig: Take 2 mg by mouth at bedtime as needed for Sleep.   Galcanezumab-gnlm (EMGALITY) 120 MG/ML Solution Auto-injector  Patient No No   Sig: Inject 120 mg as instructed Q30 DAYS.   Patient not taking: Reported on 2/17/2021   HYDROcodone-acetaminophen (NORCO) 5-325 MG Tab per tablet  Patient Yes No   Sig: Take 1 Tab by mouth every 8 hours as needed.   ONABOTULINUMTOXINA INJ  Patient Yes No   Sig: Inject  into the shoulder, thigh, or buttocks every 3 months.   clonazePAM (KLONOPIN) 1 MG Tab  Patient Yes No   Sig: Take 1 mg by mouth at bedtime as needed (insomnia).   fluticasone (FLONASE) 50 MCG/ACT nasal spray  Patient No No   Sig: Spray 1 Spray in nose every day.   Patient not taking: Reported on 2/17/2021   gabapentin (NEURONTIN) 300 MG Cap  Patient No No   Sig: Take 3 Caps by mouth 3 times a day as needed (pain).   lacosamide (VIMPAT) 200 MG Tab tablet  Patient Yes No   Sig: Take 200 mg by mouth 2 Times a Day.   rizatriptan (MAXALT-MLT) 10 MG disintegrating tablet  Patient No No   Sig: Take 1/2-1 tablet po at onset of headache, may repeat dose in 2 hours if unrelieved.  Do not exceed more than 2 tablets in 24 hours.   Patient taking differently: Take 10-20 mg by mouth one time as needed for Migraine.   tizanidine (ZANAFLEX) 4 MG Tab  Patient Yes No   Sig: Take 8 mg by mouth at bedtime as needed.   zolpidem (AMBIEN) 10 MG Tab  Patient Yes No   Sig: Take 10 mg by mouth at bedtime as needed for Sleep.      Facility-Administered Medications: None       Physical Exam  Temp:  [36.7 °C (98 °F)] 36.7 °C (98 °F)  Pulse:  [101-152] 113  Resp:  [17-26] 26  BP: (128-163)/(73-99) 135/73  SpO2:  [97 %-98 %] 98 %    Physical Exam  Constitutional:       General: She is not in acute distress.      Appearance: Normal appearance. She is not ill-appearing.      Comments: Pt is not in acute distress but appears very uncomfortable   HENT:      Head: Normocephalic and atraumatic.      Mouth/Throat:      Mouth: Mucous membranes are moist.      Pharynx: Oropharynx is clear.   Eyes:      Extraocular Movements: Extraocular movements intact.      Conjunctiva/sclera: Conjunctivae normal.      Pupils: Pupils are equal, round, and reactive to light.   Cardiovascular:      Rate and Rhythm: Regular rhythm. Tachycardia present.      Pulses: Normal pulses.      Heart sounds: No murmur.   Pulmonary:      Effort: Pulmonary effort is normal. No respiratory distress.      Breath sounds: Normal breath sounds. No wheezing.   Abdominal:      General: Bowel sounds are normal. There is no distension.      Palpations: Abdomen is soft.   Musculoskeletal:         General: No swelling.      Cervical back: Normal range of motion and neck supple.      Right lower leg: No edema.      Left lower leg: No edema.      Comments: Tenderness over lower spine with with paraspinal muscle tenderness and pain in the left gluteal region.   Skin:     General: Skin is warm and dry.      Capillary Refill: Capillary refill takes less than 2 seconds.   Neurological:      Mental Status: She is alert and oriented to person, place, and time. Mental status is at baseline.      Cranial Nerves: No cranial nerve deficit.      Motor: Weakness present.   Psychiatric:      Comments: Anxious affect         Laboratory:  Recent Labs     02/22/21  1117   WBC 7.5   RBC 4.67   HEMOGLOBIN 14.1   HEMATOCRIT 40.4   MCV 86.5   MCH 30.2   MCHC 34.9   RDW 39.9   PLATELETCT 287   MPV 9.6     Recent Labs     02/22/21  1117   SODIUM 136   POTASSIUM 3.7   CHLORIDE 105   CO2 22   GLUCOSE 107*   BUN 9   CREATININE 0.54   CALCIUM 9.5     Recent Labs     02/22/21  1117   ALTSGPT 15   ASTSGOT 16   ALKPHOSPHAT 38   TBILIRUBIN 0.4   GLUCOSE 107*         No results for input(s): NTPROBNP in  the last 72 hours.      No results for input(s): TROPONINT in the last 72 hours.    Imaging:  MR-LUMBAR SPINE-W/O    (Results Pending)   IR-CONSULT AND TREAT    (Results Pending)         Assessment/Plan:  I anticipate this patient is appropriate for observation status at this time.    * Lumbosacral radiculopathy  Assessment & Plan  Pt with known L4-S1 disc herniation with acute worsening in radiculopathy, denies new trauma however did go for walk on saturday with stretching that may have incited acute worsening   - pt follows with Dr. Corbett, was planned to have surgery on 3/1/2021   - Neurosurgery Dr. Goode consulted by ERP, appreciate recommendations and help with management  - will get repeat MRI   - IR spinal block ordered   - will give IV methylprednisolone   - supportive care with pain control    - continue gabapentin, continue home muscle relaxers  - PT/OT    Bacteriuria with pyuria- (present on admission)  Assessment & Plan  UA positive for occult blood, LE, bacteria and WBC   - pt is asymptomatic however is 8 weeks present, treatment is recommended   - will given IV rocephin     8 weeks gestation of pregnancy- (present on admission)  Assessment & Plan  Pt is 8 weeks pregnant, stable   - avoid teratogenic medications   - supportive care    Seizure disorder (HCC)- (present on admission)  Assessment & Plan  Hx of partial complex seizure disorder, well controlled per patient   - continue home anti-epileptics   - seizure precautions      Patient/Caregiver provided printed discharge information.

## 2021-02-22 NOTE — DISCHARGE PLANNING
Medical Social Work    Care Transition Team Assessment    In case of emergency please contact pt's spouse, rDu Rice ([phone])    LSW met w/ pt at bedside to obtain information used for this assessment. Pt is A/Ox4 and agreeable to answering questions. Pt confirms that all information on Facesheet is accurate. Pt does not use any DME. Pt is normally independent in all ADL's and IADL's but pt reports that she doesn't drive. Pt's spouse assists her at home as needed. Pt denies hx of substance abuse but pt reports feeling anxious and depressed. Pt does not take any medications for her anxiety/depression. Pt's preferred [facility] is [facility] [address] ([phone]). Transportation at time of discharge can be provided by pt's spouse.     Information Source  Orientation : Oriented x 4  Information Given By: Patient  Informant's Name: Roxanna Guzmán  Who is responsible for making decisions for patient? : Patient    Elopement Risk  Legal Hold: No    Discharge Preparedness  What is your plan after discharge?: Uncertain - pending medical team collaboration  What are your discharge supports?: Spouse  Prior Functional Level: Needs Assist with Activities of Daily Living, Independent with Medication Management  Difficulity with ADLs: Bathing, Dressing, Walking  Difficulty with ADLs Comment: Pt has been experiencing a lot of back pain  Difficulity with IADLs: Driving  Difficulity with IADL Comments: Pt does not drive.    Functional Assesment  Prior Functional Level: Needs Assist with Activities of Daily Living, Independent with Medication Management    Finances  Financial Barriers to Discharge: No  Prescription Coverage: Yes  Prescription Coverage Comments: [facility] [phone]      Advance Directive  Advance Directive?: None    Domestic Abuse  Have you ever been the victim of abuse or violence?: No    Psychological Assessment  History of Substance Abuse: None  History of Psychiatric  Problems: No(Pt reports feeling anxious & depressed but not on any meds.)    Discharge Risks or Barriers  Discharge risks or barriers?: No    Anticipated Discharge Information  Discharge Disposition: Still a Patient (30)  Discharge Address: Pt resides at [address] [place] [address]   Discharge Contact Phone Number: [phone]

## 2021-02-22 NOTE — ASSESSMENT & PLAN NOTE
- UA positive for occult blood, LE, bacteria and WBC   - pt is asymptomatic however is 8 weeks present, treatment is recommended   -Continue IV ceftriaxone x 3 days.

## 2021-02-23 ENCOUNTER — APPOINTMENT (OUTPATIENT)
Dept: RADIOLOGY | Facility: MEDICAL CENTER | Age: 36
End: 2021-02-23
Attending: INTERNAL MEDICINE
Payer: COMMERCIAL

## 2021-02-23 PROBLEM — E87.1 HYPONATREMIA: Status: ACTIVE | Noted: 2021-02-23

## 2021-02-23 LAB
ANION GAP SERPL CALC-SCNC: 6 MMOL/L (ref 7–16)
BASOPHILS # BLD AUTO: 0.3 % (ref 0–1.8)
BASOPHILS # BLD: 0.03 K/UL (ref 0–0.12)
BUN SERPL-MCNC: 9 MG/DL (ref 8–22)
CALCIUM SERPL-MCNC: 9.2 MG/DL (ref 8.5–10.5)
CHLORIDE SERPL-SCNC: 101 MMOL/L (ref 96–112)
CO2 SERPL-SCNC: 22 MMOL/L (ref 20–33)
CREAT SERPL-MCNC: 0.48 MG/DL (ref 0.5–1.4)
EOSINOPHIL # BLD AUTO: 0.02 K/UL (ref 0–0.51)
EOSINOPHIL NFR BLD: 0.2 % (ref 0–6.9)
ERYTHROCYTE [DISTWIDTH] IN BLOOD BY AUTOMATED COUNT: 39.8 FL (ref 35.9–50)
GLUCOSE SERPL-MCNC: 110 MG/DL (ref 65–99)
HCT VFR BLD AUTO: 37.6 % (ref 37–47)
HGB BLD-MCNC: 13.1 G/DL (ref 12–16)
IMM GRANULOCYTES # BLD AUTO: 0.05 K/UL (ref 0–0.11)
IMM GRANULOCYTES NFR BLD AUTO: 0.5 % (ref 0–0.9)
LYMPHOCYTES # BLD AUTO: 1.73 K/UL (ref 1–4.8)
LYMPHOCYTES NFR BLD: 16.6 % (ref 22–41)
MCH RBC QN AUTO: 30.5 PG (ref 27–33)
MCHC RBC AUTO-ENTMCNC: 34.8 G/DL (ref 33.6–35)
MCV RBC AUTO: 87.4 FL (ref 81.4–97.8)
MONOCYTES # BLD AUTO: 0.6 K/UL (ref 0–0.85)
MONOCYTES NFR BLD AUTO: 5.8 % (ref 0–13.4)
NEUTROPHILS # BLD AUTO: 7.99 K/UL (ref 2–7.15)
NEUTROPHILS NFR BLD: 76.6 % (ref 44–72)
NRBC # BLD AUTO: 0 K/UL
NRBC BLD-RTO: 0 /100 WBC
PLATELET # BLD AUTO: 297 K/UL (ref 164–446)
PMV BLD AUTO: 9.9 FL (ref 9–12.9)
POTASSIUM SERPL-SCNC: 3.5 MMOL/L (ref 3.6–5.5)
RBC # BLD AUTO: 4.3 M/UL (ref 4.2–5.4)
SODIUM SERPL-SCNC: 129 MMOL/L (ref 135–145)
WBC # BLD AUTO: 10.4 K/UL (ref 4.8–10.8)

## 2021-02-23 PROCEDURE — 72148 MRI LUMBAR SPINE W/O DYE: CPT

## 2021-02-23 PROCEDURE — 700102 HCHG RX REV CODE 250 W/ 637 OVERRIDE(OP): Performed by: INTERNAL MEDICINE

## 2021-02-23 PROCEDURE — 700111 HCHG RX REV CODE 636 W/ 250 OVERRIDE (IP): Performed by: INTERNAL MEDICINE

## 2021-02-23 PROCEDURE — 96376 TX/PRO/DX INJ SAME DRUG ADON: CPT

## 2021-02-23 PROCEDURE — A9270 NON-COVERED ITEM OR SERVICE: HCPCS | Performed by: INTERNAL MEDICINE

## 2021-02-23 PROCEDURE — 700105 HCHG RX REV CODE 258: Performed by: STUDENT IN AN ORGANIZED HEALTH CARE EDUCATION/TRAINING PROGRAM

## 2021-02-23 PROCEDURE — G0378 HOSPITAL OBSERVATION PER HR: HCPCS

## 2021-02-23 PROCEDURE — 700102 HCHG RX REV CODE 250 W/ 637 OVERRIDE(OP): Performed by: STUDENT IN AN ORGANIZED HEALTH CARE EDUCATION/TRAINING PROGRAM

## 2021-02-23 PROCEDURE — 700111 HCHG RX REV CODE 636 W/ 250 OVERRIDE (IP): Performed by: STUDENT IN AN ORGANIZED HEALTH CARE EDUCATION/TRAINING PROGRAM

## 2021-02-23 PROCEDURE — 700111 HCHG RX REV CODE 636 W/ 250 OVERRIDE (IP)

## 2021-02-23 PROCEDURE — 85025 COMPLETE CBC W/AUTO DIFF WBC: CPT

## 2021-02-23 PROCEDURE — 80048 BASIC METABOLIC PNL TOTAL CA: CPT

## 2021-02-23 PROCEDURE — 99226 PR SUBSEQUENT OBSERVATION CARE,LEVEL III: CPT | Performed by: INTERNAL MEDICINE

## 2021-02-23 PROCEDURE — 36415 COLL VENOUS BLD VENIPUNCTURE: CPT

## 2021-02-23 PROCEDURE — 96375 TX/PRO/DX INJ NEW DRUG ADDON: CPT

## 2021-02-23 PROCEDURE — A9270 NON-COVERED ITEM OR SERVICE: HCPCS | Performed by: STUDENT IN AN ORGANIZED HEALTH CARE EDUCATION/TRAINING PROGRAM

## 2021-02-23 PROCEDURE — 96372 THER/PROPH/DIAG INJ SC/IM: CPT

## 2021-02-23 RX ORDER — MORPHINE SULFATE 4 MG/ML
2 INJECTION, SOLUTION INTRAMUSCULAR; INTRAVENOUS
Status: DISCONTINUED | OUTPATIENT
Start: 2021-02-23 | End: 2021-02-24 | Stop reason: HOSPADM

## 2021-02-23 RX ORDER — VITAMIN A ACETATE, BETA CAROTENE, ASCORBIC ACID, CHOLECALCIFEROL, .ALPHA.-TOCOPHEROL ACETATE, DL-, THIAMINE MONONITRATE, RIBOFLAVIN, NIACINAMIDE, PYRIDOXINE HYDROCHLORIDE, FOLIC ACID, CYANOCOBALAMIN, CALCIUM CARBONATE, FERROUS FUMARATE, ZINC OXIDE, CUPRIC OXIDE 3080; 12; 120; 400; 1; 1.84; 3; 20; 22; 920; 25; 200; 27; 10; 2 [IU]/1; UG/1; MG/1; [IU]/1; MG/1; MG/1; MG/1; MG/1; MG/1; [IU]/1; MG/1; MG/1; MG/1; MG/1; MG/1
1 TABLET, FILM COATED ORAL
Status: DISCONTINUED | OUTPATIENT
Start: 2021-02-23 | End: 2021-02-24 | Stop reason: HOSPADM

## 2021-02-23 RX ORDER — LORAZEPAM 2 MG/ML
1 INJECTION INTRAMUSCULAR ONCE
Status: COMPLETED | OUTPATIENT
Start: 2021-02-23 | End: 2021-02-23

## 2021-02-23 RX ORDER — LORAZEPAM 2 MG/ML
0.5 INJECTION INTRAMUSCULAR
Status: DISCONTINUED | OUTPATIENT
Start: 2021-02-23 | End: 2021-02-24 | Stop reason: HOSPADM

## 2021-02-23 RX ORDER — LORAZEPAM 2 MG/ML
INJECTION INTRAMUSCULAR
Status: COMPLETED
Start: 2021-02-23 | End: 2021-02-23

## 2021-02-23 RX ORDER — OXYCODONE HYDROCHLORIDE 5 MG/1
5 TABLET ORAL
Status: DISCONTINUED | OUTPATIENT
Start: 2021-02-23 | End: 2021-02-24 | Stop reason: HOSPADM

## 2021-02-23 RX ADMIN — MORPHINE SULFATE 2 MG: 4 INJECTION INTRAVENOUS at 01:43

## 2021-02-23 RX ADMIN — OXYCODONE 5 MG: 5 TABLET ORAL at 22:02

## 2021-02-23 RX ADMIN — MORPHINE SULFATE 2 MG: 4 INJECTION INTRAVENOUS at 09:40

## 2021-02-23 RX ADMIN — CEFTRIAXONE SODIUM 1 G: 1 INJECTION, POWDER, FOR SOLUTION INTRAMUSCULAR; INTRAVENOUS at 20:04

## 2021-02-23 RX ADMIN — GABAPENTIN 900 MG: 300 CAPSULE ORAL at 18:36

## 2021-02-23 RX ADMIN — TIZANIDINE 4 MG: 4 TABLET ORAL at 03:01

## 2021-02-23 RX ADMIN — MORPHINE SULFATE 2 MG: 4 INJECTION INTRAVENOUS at 20:04

## 2021-02-23 RX ADMIN — MORPHINE SULFATE 2 MG: 4 INJECTION INTRAVENOUS at 23:25

## 2021-02-23 RX ADMIN — HEPARIN SODIUM 5000 UNITS: 5000 INJECTION, SOLUTION INTRAVENOUS; SUBCUTANEOUS at 04:08

## 2021-02-23 RX ADMIN — MORPHINE SULFATE 2 MG: 4 INJECTION INTRAVENOUS at 14:11

## 2021-02-23 RX ADMIN — PRENATAL WITH FERROUS FUM AND FOLIC ACID 1 TABLET: 3080; 920; 120; 400; 22; 1.84; 3; 20; 10; 1; 12; 200; 27; 25; 2 TABLET ORAL at 09:46

## 2021-02-23 RX ADMIN — OXYCODONE 5 MG: 5 TABLET ORAL at 12:04

## 2021-02-23 RX ADMIN — OXYCODONE 5 MG: 5 TABLET ORAL at 04:07

## 2021-02-23 RX ADMIN — TIZANIDINE 4 MG: 4 TABLET ORAL at 09:46

## 2021-02-23 RX ADMIN — MORPHINE SULFATE 2 MG: 4 INJECTION INTRAVENOUS at 06:24

## 2021-02-23 RX ADMIN — ACETAMINOPHEN 650 MG: 325 TABLET ORAL at 13:07

## 2021-02-23 RX ADMIN — DOCUSATE SODIUM 50 MG AND SENNOSIDES 8.6 MG 2 TABLET: 8.6; 5 TABLET, FILM COATED ORAL at 18:33

## 2021-02-23 RX ADMIN — LACOSAMIDE 200 MG: 100 TABLET, FILM COATED ORAL at 06:00

## 2021-02-23 RX ADMIN — GABAPENTIN 900 MG: 300 CAPSULE ORAL at 04:07

## 2021-02-23 RX ADMIN — LORAZEPAM 1 MG: 2 INJECTION INTRAMUSCULAR; INTRAVENOUS at 16:30

## 2021-02-23 RX ADMIN — MORPHINE SULFATE 2 MG: 4 INJECTION INTRAVENOUS at 16:01

## 2021-02-23 RX ADMIN — DOCUSATE SODIUM 50 MG AND SENNOSIDES 8.6 MG 2 TABLET: 8.6; 5 TABLET, FILM COATED ORAL at 04:07

## 2021-02-23 RX ADMIN — ACETAMINOPHEN 650 MG: 325 TABLET ORAL at 22:02

## 2021-02-23 RX ADMIN — LORAZEPAM 1 MG: 2 INJECTION INTRAMUSCULAR at 16:30

## 2021-02-23 RX ADMIN — LACOSAMIDE 200 MG: 100 TABLET, FILM COATED ORAL at 18:33

## 2021-02-23 RX ADMIN — OXYCODONE 5 MG: 5 TABLET ORAL at 00:32

## 2021-02-23 RX ADMIN — OXYCODONE 5 MG: 5 TABLET ORAL at 18:36

## 2021-02-23 ASSESSMENT — COGNITIVE AND FUNCTIONAL STATUS - GENERAL
DAILY ACTIVITIY SCORE: 24
MOBILITY SCORE: 23
SUGGESTED CMS G CODE MODIFIER MOBILITY: CI
SUGGESTED CMS G CODE MODIFIER DAILY ACTIVITY: CH
CLIMB 3 TO 5 STEPS WITH RAILING: A LITTLE

## 2021-02-23 ASSESSMENT — LIFESTYLE VARIABLES
ON A TYPICAL DAY WHEN YOU DRINK ALCOHOL HOW MANY DRINKS DO YOU HAVE: 0
TOTAL SCORE: 0
HAVE PEOPLE ANNOYED YOU BY CRITICIZING YOUR DRINKING: NO
DOES PATIENT WANT TO STOP DRINKING: NO
HOW MANY TIMES IN THE PAST YEAR HAVE YOU HAD 5 OR MORE DRINKS IN A DAY: 0
EVER FELT BAD OR GUILTY ABOUT YOUR DRINKING: NO
ALCOHOL_USE: NO
CONSUMPTION TOTAL: NEGATIVE
EVER HAD A DRINK FIRST THING IN THE MORNING TO STEADY YOUR NERVES TO GET RID OF A HANGOVER: NO
AVERAGE NUMBER OF DAYS PER WEEK YOU HAVE A DRINK CONTAINING ALCOHOL: 0
TOTAL SCORE: 0
TOTAL SCORE: 0
HAVE YOU EVER FELT YOU SHOULD CUT DOWN ON YOUR DRINKING: NO

## 2021-02-23 ASSESSMENT — PAIN DESCRIPTION - PAIN TYPE
TYPE: ACUTE PAIN

## 2021-02-23 ASSESSMENT — PATIENT HEALTH QUESTIONNAIRE - PHQ9
SUM OF ALL RESPONSES TO PHQ9 QUESTIONS 1 AND 2: 0
2. FEELING DOWN, DEPRESSED, IRRITABLE, OR HOPELESS: NOT AT ALL
1. LITTLE INTEREST OR PLEASURE IN DOING THINGS: NOT AT ALL

## 2021-02-23 NOTE — PROGRESS NOTES
Report received from ALENA Zaragoza.  Patient lying in bed on her stomach.  Pt has been in severe pain from her lower back and legs on the left side, but it has been more tolerable since alternating oxycodone and morphine.  POC gone over with pt and all questions answered.  Patient A & O  X 4.  Safety precautions in place.  Patient educated to call for assistance.  Will continue to monitor.

## 2021-02-23 NOTE — PROGRESS NOTES
Neurosurgery Progress Note    Subjective:  Radicular left leg pain controlled  Pt reports mild persistent left leg swelling, improving. DVT study on  negative    Exam:  A&O, MARTINS    BP  Min: 113/75  Max: 163/99  Pulse  Av.9  Min: 68  Max: 152  Resp  Av.1  Min: 16  Max: 45  Temp  Av.7 °C (98 °F)  Min: 36.5 °C (97.7 °F)  Max: 37 °C (98.6 °F)  SpO2  Av %  Min: 95 %  Max: 99 %    No data recorded    Recent Labs     21  1117 21  0310   WBC 7.5 10.4   RBC 4.67 4.30   HEMOGLOBIN 14.1 13.1   HEMATOCRIT 40.4 37.6   MCV 86.5 87.4   MCH 30.2 30.5   MCHC 34.9 34.8   RDW 39.9 39.8   PLATELETCT 287 297   MPV 9.6 9.9     Recent Labs     21  1117 21  0310   SODIUM 136 129*   POTASSIUM 3.7 3.5*   CHLORIDE 105 101   CO2 22 22   GLUCOSE 107* 110*   BUN 9 9   CREATININE 0.54 0.48*   CALCIUM 9.5 9.2               Intake/Output       21 07 - 21 0659 21 07 - 21 0659      [id number] 9045-0769 Total  5260-6536 Total       Intake    Total Intake -- -- -- -- -- --       Output    Urine  200  -- 200  --  -- --    Number of Times Voided 1 x 2 x 3 x -- -- --    Urine Void (mL) 200 -- 200 -- -- --    Total Output 200 -- 200 -- -- --       Net I/O     -200 -- -200 -- -- --            Intake/Output Summary (Last 24 hours) at 2021 0927  Last data filed at 2021 1821  Gross per 24 hour   Intake --   Output 200 ml   Net -200 ml            • prenatal plus vitamin  1 tablet Daily-0800   • ondansetron  4 mg Once   • senna-docusate  2 tablet BID    And   • polyethylene glycol/lytes  1 Packet QDAY PRN    And   • magnesium hydroxide  30 mL QDAY PRN    And   • bisacodyl  10 mg QDAY PRN   • [Held by provider] heparin  5,000 Units Q8HRS   • acetaminophen  650 mg Q6HRS PRN   • labetalol  10 mg Q4HRS PRN   • oxyCODONE immediate-release  5 mg Q4HRS PRN   • gabapentin  900 mg TID PRN   • lacosamide  200 mg BID   • zolpidem  10 mg HS PRN   • tizanidine  4 mg Q6HRS PRN   •  cefTRIAXone (ROCEPHIN) IV  1 g Q24HRS   • morphine injection  2 mg Q3HRS PRN       Assessment and Plan:  Hospital day #2 lumbar radiculopathy, 8wk pregnant  POD #na  Prophylactic anticoagulation: no         Start date/time: tbd    Neuro stable  OBGYN Dr Mendoza provided surgical clearance to proceed. Avoid benzos  Surgery tomorrow 0930  NPO at [facility] [place]  Labs reviewed  Pt's questions answered by Dr Goode this AM

## 2021-02-23 NOTE — CARE PLAN
Problem: Safety  Goal: Free from accidental injury  Outcome: PROGRESSING AS EXPECTED     Problem: Safety  Goal: Free from intentional harm  Outcome: PROGRESSING AS EXPECTED     Problem: Safety  Goal: Free from self harm  Outcome: PROGRESSING AS EXPECTED     Problem: Skin Integrity  Goal: Skin Integrity is maintained or improved  Outcome: PROGRESSING AS EXPECTED     Problem: Pain  Goal: Alleviation of Pain or a reduction in pain to the patient's comfort goal  Outcome: PROGRESSING AS EXPECTED

## 2021-02-23 NOTE — CARE PLAN
Problem: Elimination  Goal: Establishment and Maintenance of regular bowel elimination  Outcome: NOT MET     Problem: Safety  Goal: Free from accidental injury  Outcome: PROGRESSING AS EXPECTED  Note: Pt educated on safety precautions, utilization of the call light, and bed alarm.  Pt verbalized understanding.      Problem: Knowledge Deficit  Goal: Patient/Significant other demonstrates understanding of disease process, treatment plan, medications and discharge instructions  Outcome: PROGRESSING AS EXPECTED     Problem: Risk for Impaired Mobility  Goal: Mobilize and/or Transfer Safely with [place]  Outcome: PROGRESSING AS EXPECTED     Problem: Self Care Deficit  Goal: Ability to bathe, groom and dress independently or with assistance  Outcome: PROGRESSING AS EXPECTED     Problem: Elimination  Goal: Regular urinary elimination  Outcome: PROGRESSING AS EXPECTED

## 2021-02-23 NOTE — PROGRESS NOTES
Hospital Medicine Daily Progress Note    Date of Service  2/23/2021    Chief Complaint  Back pain    Hospital Course  35 y.o. female with history of partial complex seizure, chronic migraine, history of L4-S1 disc herniation due to back injury back in November and is scheduled for surgery on March 1, admitted 2/22/2021 with acute worsening of low back pain with left leg radiculopathy.  Initial work-up was negative.  MRI showed L4-5 disc herniation with foraminal stenosis.  Neurosurgery was consulted, and recommended L4-5 discectomy, and is being scheduled for OR.    Interval Problem Update  2/23/2021 - I reviewed the patient's chart. There were no significant overnight events. Remains hemodynamically stable and afebrile. Stable on RA.  Labs remain unimpressive except for mildly low potassium of 3.5, and sodium of 129.  She has been scheduled for lumbar laminectomy with discectomy L4-5 on 2/24 at 9 AM.    > I have personally seen and examined the patient today.  Her back pain is better, now rated 5 out of 10 from 10 out of 10 initially.  She states she is not able to put much weight on her left leg.  Otherwise she has no other complaints.  No chest pain, shortness of breath, nausea, vomiting, abdominal pain.  She does feel constipated, but is getting stool softeners.        Consultants/Specialty  Neurosurgery  IR    Code Status  Full Code    Disposition  Transfer to medical    Review of Systems  ROS     Pertinent positives/negatives as mentioned above.     A complete review of systems was personally done by me. All other systems were negative.       Physical Exam  Temp:  [36.5 °C (97.7 °F)-37 °C (98.6 °F)] 37 °C (98.6 °F)  Pulse:  [] 68  Resp:  [16-45] 17  BP: (113-163)/(73-99) 140/81  SpO2:  [95 %-99 %] 99 %    Physical Exam  Vitals reviewed.   Constitutional:       General: She is not in acute distress.     Appearance: Normal appearance. She is normal weight. She is not ill-appearing or diaphoretic.   HENT:       Head: Normocephalic and atraumatic.      Right Ear: External ear normal.      Left Ear: External ear normal.      Mouth/Throat:      Mouth: Mucous membranes are moist.      Pharynx: No oropharyngeal exudate or posterior oropharyngeal erythema.   Eyes:      General: No scleral icterus.     Extraocular Movements: Extraocular movements intact.      Conjunctiva/sclera: Conjunctivae normal.      Pupils: Pupils are equal, round, and reactive to light.   Cardiovascular:      Rate and Rhythm: Normal rate and regular rhythm.      Heart sounds: Normal heart sounds. No murmur.   Pulmonary:      Effort: Pulmonary effort is normal. No respiratory distress.      Breath sounds: Normal breath sounds. No stridor. No wheezing, rhonchi or rales.   Chest:      Chest wall: No tenderness.   Abdominal:      General: Bowel sounds are normal. There is no distension.      Palpations: Abdomen is soft. There is no mass.      Tenderness: There is no abdominal tenderness. There is no guarding or rebound.   Musculoskeletal:         General: No swelling. Normal range of motion.      Cervical back: Normal range of motion and neck supple. No rigidity. No muscular tenderness.      Right lower leg: No edema.      Left lower leg: No edema.   Lymphadenopathy:      Cervical: No cervical adenopathy.   Skin:     General: Skin is warm and dry.      Coloration: Skin is not jaundiced.      Findings: No rash.   Neurological:      General: No focal deficit present.      Mental Status: She is alert and oriented to person, place, and time. Mental status is at baseline.      Cranial Nerves: No cranial nerve deficit.   Psychiatric:         Mood and Affect: Mood normal.         Behavior: Behavior normal.         Thought Content: Thought content normal.         Judgment: Judgment normal.         Fluids    Intake/Output Summary (Last 24 hours) at 2/23/2021 0727  Last data filed at 2/22/2021 1821  Gross per 24 hour   Intake --   Output 200 ml   Net -200 ml        Laboratory  Recent Labs     02/22/21  1117 02/23/21  0310   WBC 7.5 10.4   RBC 4.67 4.30   HEMOGLOBIN 14.1 13.1   HEMATOCRIT 40.4 37.6   MCV 86.5 87.4   MCH 30.2 30.5   MCHC 34.9 34.8   RDW 39.9 39.8   PLATELETCT 287 297   MPV 9.6 9.9     Recent Labs     02/22/21  1117 02/23/21  0310   SODIUM 136 129*   POTASSIUM 3.7 3.5*   CHLORIDE 105 101   CO2 22 22   GLUCOSE 107* 110*   BUN 9 9   CREATININE 0.54 0.48*   CALCIUM 9.5 9.2                   Imaging  MR-LUMBAR SPINE-W/O   Final Result      1.  Ambiguous segmentation of the lumbosacral spine with a transitional type SI vertebral body. For the purposes of this report, the lowest rib-bearing vertebral body is labeled T12.   2.  L5-S1 redemonstrates a large left posterior paracentral disc herniation with an extruded disc fragment extending 2 cm caudally in the left ventrolateral epidural space. This results in compression of the left L1 nerve roots as it courses inferiorly.    This finding does not appear significantly change versus the previous exam   3.  Additionally, at L5-S1 there is moderate bilateral neural foraminal stenosis secondary to underlying broad disc bulge, endplate spurring and facet arthropathy.           Assessment/Plan  * Lumbosacral radiculopathy due to L4-5 disc herniation with foraminal stenosis- (present on admission)  Assessment & Plan  -Failed outpatient conservative management.  -Neurosurgery on board, plan for lumbar laminectomy with discectomy L4-5 on 2/24.  -IR consulted, being scheduled for transforaminal L5 nerve block, hopefully can be done today.  -Continue pain control with Neurontin, Zanaflex, along with oxycodone and morphine.  -PT/OT postoperatively.    Hyponatremia  Assessment & Plan  -Likely due to pain.  Euvolemic.  -Monitor.    Bacteriuria with pyuria in pregnancy- (present on admission)  Assessment & Plan  - UA positive for occult blood, LE, bacteria and WBC   - pt is asymptomatic however is 8 weeks present, treatment is  recommended   -Continue IV ceftriaxone x 3 days.    8 weeks gestation of pregnancy- (present on admission)  Assessment & Plan  - Pt is 8 weeks pregnant, stable   - avoid teratogenic medications   - supportive care.  Start eran multivitamins.  -Outpatient follow-up with OB.    Seizure disorder (HCC)- (present on admission)  Assessment & Plan  - Hx of partial complex seizure disorder, well controlled per patient   - continue home anti-epileptics.   - seizure precautions.       VTE prophylaxis: SCD. Hold heparin for spinal procedures

## 2021-02-23 NOTE — PROGRESS NOTES
Pt to T214, pt is a&o x4 c/o excruciating back pain.  Oriented pt to rm and updated with plan of care. Pt to IR tomorrow for spinal block and OR 2/24.  Will medicate medicate for pain, call light in reach and encourage to call for assistance.

## 2021-02-23 NOTE — PROGRESS NOTES
Assessment completed. Pt A&Ox4. Pt reports 10/10 back pain, medicated per MAR. POC discussed, communication board updated.    Bed in locked, lowest position. Call light and belongings within reach. All needs met at this time.

## 2021-02-23 NOTE — CARE PLAN
Problem: Knowledge Deficit  Goal: Patient/Significant other demonstrates understanding of disease process, treatment plan, medications and discharge instructions  Outcome: PROGRESSING AS EXPECTED     Problem: Pain  Goal: Alleviation of Pain or a reduction in pain to the patient's comfort goal  Outcome: PROGRESSING SLOWER THAN EXPECTED

## 2021-02-23 NOTE — CONSULTS
Neurosurgery Consultation  Referring MD:  Toni Nair M.D.   2/23/2021 0800    Primary care provider: SONIA Paredes     CHIEF COMPLAINT  Left leg pain    HPI   Roxanna Guzmán is a 35 y.o. female with  Worsening severe low back pain readiating down her left leg. She has a herniated disk at L4-S1 as a result of an injury on November 2020 and was scheduled to have surgery last Wednesday with Dr. Corbett. Surgery was postponed when preop pregnancy test was positive.  She has been cleared by her OB for surgery next week.  Her pain has significantly worsened in the interim, and is intolerable at this point.  She has been spending the day lying on her living room floor.  Patient states that this morning, a pain which radiates from above her pubic symphisis down her left leg developed.     She takes Gabapentin and Flexeril. She denies any recent falls or trauma. She has a history of migraines and complex partial seizures     REVIEW OF SYSTEMS   Pertinent positives include back pain, left leg weakness and numbness, difficulty with urination. Pertinent negatives include no urinary incontinence, fever, dysuria, or bowel incontinence. All other systems reviewed and negative.   PAST MEDICAL HISTORY   has a past medical history of Anesthesia (02/10/2021), Anxiety, Depression, Head ache, Migraine, Seizure disorder, complex partial (HCC) (02/10/2021), and TMJ arthropathy.   SURGICAL HISTORY   has a past surgical history that includes hernia repair; blepharoplasty (Bilateral, 2019); leep (2003); lipoma excision; and lumbar transforaminal epidural steroid injection (Bilateral, 2/1/2021).   SOCIAL HISTORY   Social History           Tobacco Use   • Smoking status: Never Smoker   • Smokeless tobacco: Never Used   Substance Use Topics   • Alcohol use: Not Currently   • Drug use: Not Currently     Types: Oral     Comment: marijuana edibles     Social History          Substance and Sexual Activity   Drug Use Not  "Currently   • Types: Oral    Comment: marijuana edibles     FAMILY HISTORY   URRENT MEDICATIONS        Current Outpatient Medications   Medication Instructions   • ALPRAZolam (XANAX) 2 mg, Oral, NIGHTLY PRN   • clonazePAM (KLONOPIN) 1 mg, Oral, EVERY BEDTIME PRN   • Emgality 120 mg, Subcutaneous, EVERY 30 DAYS   • fluticasone (FLONASE) 50 mcg, Nasal, DAILY   • gabapentin (NEURONTIN) 900 mg, Oral, 3 TIMES DAILY PRN   • HYDROcodone-acetaminophen (NORCO) 5-325 MG Tab per tablet 1 tablet, Oral, EVERY 8 HOURS PRN   • lacosamide (VIMPAT) 200 mg, Oral, 2 TIMES DAILY   • ONABOTULINUMTOXINA INJ Intramuscular, EVERY 3 MONTHS   • rizatriptan (MAXALT-MLT) 10 MG disintegrating tablet Take 1/2-1 tablet po at onset of headache, may repeat dose in 2 hours if unrelieved. Do not exceed more than 2 tablets in 24 hours.   • tizanidine (ZANAFLEX) 8 mg, Oral, EVERY BEDTIME PRN   • zolpidem (AMBIEN) 10 mg, Oral, NIGHTLY PRN     ALLERGIES   No Known Allergies     PHYSICAL EXAM   VITAL SIGNS: BP (!) 163/99  Pulse (!) 152  Temp 36.7 °C (98 °F) (Temporal)  Resp (!) 24  Ht 1.753 m (5' 9\")  Wt 71.2 kg (157 lb)  LMP 02/10/2021 (Exact Date)  SpO2 97%  BMI 23.18 kg/m²   Constitutional: Well developed, Well nourished, moderate to severe distress, Non-toxic appearance.   HENT: Normocephalic  eyes: nonicteric   Neck: Supple,   Skin: Warm, Dry  Neurologic  Awake, alert   Speech fluent appropriate  In no apparent distress  Affect, mood appropriate  Oriented x 3  Pupils 3 mm midline, reactive.  Conjugate gaze.  Visual fields full to confrontation  Face symmetric  Tongue midline without fasciculation  Facial sensation intact light touch  Hearing intact to conversation, light finger rub bilaterally  Motor:  Bilateral IP, thigh adduction, thigh abduction, dorsiflexion,                Plantar flexion,EHL 5/5 no pronator drift  Sensation:  Intact touch  four extremities  Reflex:  Brisk bilateral patella  Finger-nose-finger, AMAURY " unremarkable    Psychiatric: Affect normal   DIAGNOSTIC STUDIES / PROCEDURES   LABS          Results for orders placed or performed during the hospital encounter of 02/22/21   CBC WITH DIFFERENTIAL   Result Value Ref Range    WBC 7.5 4.8 - 10.8 K/uL    RBC 4.67 4.20 - 5.40 M/uL    Hemoglobin 14.1 12.0 - 16.0 g/dL    Hematocrit 40.4 37.0 - 47.0 %    MCV 86.5 81.4 - 97.8 fL    MCH 30.2 27.0 - 33.0 pg    MCHC 34.9 33.6 - 35.0 g/dL    RDW 39.9 35.9 - 50.0 fL    Platelet Count 287 164 - 446 K/uL    MPV 9.6 9.0 - 12.9 fL    Neutrophils-Polys 74.90 (H) 44.00 - 72.00 %    Lymphocytes 17.80 (L) 22.00 - 41.00 %    Monocytes 6.00 0.00 - 13.40 %    Eosinophils 0.50 0.00 - 6.90 %    Basophils 0.40 0.00 - 1.80 %    Immature Granulocytes 0.40 0.00 - 0.90 %    Nucleated RBC 0.00 /100 WBC    Neutrophils (Absolute) 5.63 2.00 - 7.15 K/uL    Lymphs (Absolute) 1.34 1.00 - 4.80 K/uL    Monos (Absolute) 0.45 0.00 - 0.85 K/uL    Eos (Absolute) 0.04 0.00 - 0.51 K/uL    Baso (Absolute) 0.03 0.00 - 0.12 K/uL    Immature Granulocytes (abs) 0.03 0.00 - 0.11 K/uL    NRBC (Absolute) 0.00 K/uL   COMP METABOLIC PANEL   Result Value Ref Range    Sodium 136 135 - 145 mmol/L    Potassium 3.7 3.6 - 5.5 mmol/L    Chloride 105 96 - 112 mmol/L    Co2 22 20 - 33 mmol/L    Anion Gap 9.0 7.0 - 16.0    Glucose 107 (H) 65 - 99 mg/dL    Bun 9 8 - 22 mg/dL    Creatinine 0.54 0.50 - 1.40 mg/dL    Calcium 9.5 8.5 - 10.5 mg/dL    AST(SGOT) 16 12 - 45 U/L    ALT(SGPT) 15 2 - 50 U/L    Alkaline Phosphatase 38 30 - 99 U/L    Total Bilirubin 0.4 0.1 - 1.5 mg/dL    Albumin 4.2 3.2 - 4.9 g/dL    Total Protein 6.6 6.0 - 8.2 g/dL    Globulin 2.4 1.9 - 3.5 g/dL    A-G Ratio 1.8 g/dL   HCG QUAL SERUM   Result Value Ref Range    Beta-Hcg Qualitative Serum Positive (A) Negative   LACTIC ACID   Result Value Ref Range    Lactic Acid 1.6 0.5 - 2.0 mmol/L   URINALYSIS (UA)    Specimen: Urine   Result Value Ref Range    Color Yellow     Character Clear     Specific Gravity 1.010  <1.035    Ph 6.0 5.0 - 8.0    Glucose Negative Negative mg/dL    Ketones 15 (A) Negative mg/dL    Protein Negative Negative mg/dL    Bilirubin Negative Negative    Urobilinogen, Urine 0.2 Negative    Nitrite Negative Negative    Leukocyte Esterase Trace (A) Negative    Occult Blood Small (A) Negative    Micro Urine Req Microscopic    URINE MICROSCOPIC (W/UA)   Result Value Ref Range    WBC 20-50 (A) /hpf    RBC 0-2 /hpf    Bacteria Few (A) None /hpf    Epithelial Cells Few /hpf    Hyaline Cast 6-10 (A) /lpf   ESTIMATED GFR   Result Value Ref Range    GFR If African American >60 >60 mL/min/1.73 m 2    GFR If Non African American >60 >60 mL/min/1.73 m 2   EKG (NOW)   Result Value Ref Range    Report       [facility] Emergency Dept.   Test Date: 2021   Pt Name: MURIEL AZAR Department: ER   MRN: [id number] Room:   Gender: Female Technician: [id number]   : 1985 Requested By:ER TRIAGE PROTOCOL   Order #: [id number] Reading MD:   Measurements   Intervals Axis   Rate: 116 P: 60   ME: 152 QRS: 74   QRSD: 88 T: 13   QT: 324   QTc: 451   Interpretive Statements   SINUS TACHYCARDIA   MINIMAL ST DEPRESSION, INFERIOR LEADS   Compared to ECG 02/10/2021 14:18:40   ST (T wave) deviation now present   Sinus rhythm no longer present         RADIOLOGY  2021 2:25 PM     HISTORY/REASON FOR EXAM: Back pain or radiculopathy, > 6 wks; lumbar radiculopathy, can't walk.        TECHNIQUE/EXAM DESCRIPTION:  MRI of the lumbar spine without contrast.     The study was performed on a Shaser Signa 1.5 Sailaja MRI scanner.  T1 sagittal, T2 sagittal, and T2 axial images were obtained of the lumbar spine.     COMPARISON: Outside MRI dated 2021.     FINDINGS:  There is ambiguous segmentation of the lumbosacral spine with a transitional type S1 vertebral body segment. For the purposes of this report, the lowest rib-bearing vertebral body will be labeled T12.     The lumbar vertebral bodies have a normal height and  alignment. The conus medullaris has a normal caliber, course and signal intensity.     Level-specific findings as follows:     L1-2: Normal  L2-3: Normal  L3-4: Normal  L4-5: Normal  L5-S1: There is a large left posterior paracentral disc herniation with an extruded disc fragment which extends approximately 2 cm caudally in the left ventrolateral epidural space. There is likely compression of the S1 nerve root as it courses   inferiorly. There is moderate narrowing of the left L5 neural foramen. There is mild broad underlying disc bulge. There is mild facet arthropathy. There is moderate right neural foraminal stenosis.     There is partial visualization of a gravid uterus.     IMPRESSION:     1.  Ambiguous segmentation of the lumbosacral spine with a transitional type SI vertebral body. For the purposes of this report, the lowest rib-bearing vertebral body is labeled T12.  2.  L5-S1 redemonstrates a large left posterior paracentral disc herniation with an extruded disc fragment extending 2 cm caudally in the left ventrolateral epidural space. This results in compression of the left L1 nerve roots as it courses inferiorly.   This finding does not appear significantly change versus the previous exam  3.  Additionally, at L5-S1 there is moderate bilateral neural foraminal stenosis secondary to underlying broad disc bulge, endplate spurring and facet arthropathy.        AP:  35 year old female with severe pain secondary to left L5/S1 herniated disk.  She is requiring IV medication for pain control.  Given this, laminotomy/diskectomy is planned.  The earliest Or time available is tomorrow morning.    The procedure was discussed in detail.  Risks including but not limited to infection, bleeding, spinal fluid leak requiring bed rest, nerve root injury resulting in leg weakness/numbness/tingling/pain, recurrent disk herniation requiring repeat surgical intervention, seroma/hematoma formation, poor incision healing was  discussed.  She expressed understanding of these issues and wishes to proceed.  All questions were answered.

## 2021-02-24 ENCOUNTER — APPOINTMENT (OUTPATIENT)
Dept: RADIOLOGY | Facility: MEDICAL CENTER | Age: 36
End: 2021-02-24
Attending: NEUROLOGICAL SURGERY
Payer: COMMERCIAL

## 2021-02-24 ENCOUNTER — ANESTHESIA EVENT (OUTPATIENT)
Dept: SURGERY | Facility: MEDICAL CENTER | Age: 36
End: 2021-02-24
Payer: COMMERCIAL

## 2021-02-24 ENCOUNTER — ANESTHESIA (OUTPATIENT)
Dept: SURGERY | Facility: MEDICAL CENTER | Age: 36
End: 2021-02-24
Payer: COMMERCIAL

## 2021-02-24 VITALS
HEART RATE: 85 BPM | HEIGHT: 69 IN | TEMPERATURE: 98.2 F | SYSTOLIC BLOOD PRESSURE: 109 MMHG | WEIGHT: 157 LBS | OXYGEN SATURATION: 96 % | BODY MASS INDEX: 23.25 KG/M2 | DIASTOLIC BLOOD PRESSURE: 63 MMHG | RESPIRATION RATE: 16 BRPM

## 2021-02-24 PROCEDURE — 72020 X-RAY EXAM OF SPINE 1 VIEW: CPT

## 2021-02-24 PROCEDURE — 160009 HCHG ANES TIME/MIN: Performed by: NEUROLOGICAL SURGERY

## 2021-02-24 PROCEDURE — 700105 HCHG RX REV CODE 258: Performed by: ANESTHESIOLOGY

## 2021-02-24 PROCEDURE — 96367 TX/PROPH/DG ADDL SEQ IV INF: CPT

## 2021-02-24 PROCEDURE — 96376 TX/PRO/DX INJ SAME DRUG ADON: CPT

## 2021-02-24 PROCEDURE — 700111 HCHG RX REV CODE 636 W/ 250 OVERRIDE (IP): Performed by: INTERNAL MEDICINE

## 2021-02-24 PROCEDURE — 160002 HCHG RECOVERY MINUTES (STAT): Performed by: NEUROLOGICAL SURGERY

## 2021-02-24 PROCEDURE — 160041 HCHG SURGERY MINUTES - EA ADDL 1 MIN LEVEL 4: Performed by: NEUROLOGICAL SURGERY

## 2021-02-24 PROCEDURE — 700111 HCHG RX REV CODE 636 W/ 250 OVERRIDE (IP): Performed by: NEUROLOGICAL SURGERY

## 2021-02-24 PROCEDURE — A9270 NON-COVERED ITEM OR SERVICE: HCPCS | Performed by: STUDENT IN AN ORGANIZED HEALTH CARE EDUCATION/TRAINING PROGRAM

## 2021-02-24 PROCEDURE — 160036 HCHG PACU - EA ADDL 30 MINS PHASE I: Performed by: NEUROLOGICAL SURGERY

## 2021-02-24 PROCEDURE — 500864 HCHG NEEDLE, SPINAL 18G: Performed by: NEUROLOGICAL SURGERY

## 2021-02-24 PROCEDURE — G0378 HOSPITAL OBSERVATION PER HR: HCPCS

## 2021-02-24 PROCEDURE — 700111 HCHG RX REV CODE 636 W/ 250 OVERRIDE (IP): Performed by: STUDENT IN AN ORGANIZED HEALTH CARE EDUCATION/TRAINING PROGRAM

## 2021-02-24 PROCEDURE — 96375 TX/PRO/DX INJ NEW DRUG ADDON: CPT

## 2021-02-24 PROCEDURE — 500885 HCHG PACK, JACKSON TABLE: Performed by: NEUROLOGICAL SURGERY

## 2021-02-24 PROCEDURE — A9270 NON-COVERED ITEM OR SERVICE: HCPCS | Performed by: INTERNAL MEDICINE

## 2021-02-24 PROCEDURE — 160035 HCHG PACU - 1ST 60 MINS PHASE I: Performed by: NEUROLOGICAL SURGERY

## 2021-02-24 PROCEDURE — 110454 HCHG SHELL REV 250: Performed by: NEUROLOGICAL SURGERY

## 2021-02-24 PROCEDURE — 160029 HCHG SURGERY MINUTES - 1ST 30 MINS LEVEL 4: Performed by: NEUROLOGICAL SURGERY

## 2021-02-24 PROCEDURE — 160048 HCHG OR STATISTICAL LEVEL 1-5: Performed by: NEUROLOGICAL SURGERY

## 2021-02-24 PROCEDURE — 700102 HCHG RX REV CODE 250 W/ 637 OVERRIDE(OP): Performed by: STUDENT IN AN ORGANIZED HEALTH CARE EDUCATION/TRAINING PROGRAM

## 2021-02-24 PROCEDURE — 501838 HCHG SUTURE GENERAL: Performed by: NEUROLOGICAL SURGERY

## 2021-02-24 PROCEDURE — 700101 HCHG RX REV CODE 250: Performed by: NEUROLOGICAL SURGERY

## 2021-02-24 PROCEDURE — 700102 HCHG RX REV CODE 250 W/ 637 OVERRIDE(OP): Performed by: INTERNAL MEDICINE

## 2021-02-24 PROCEDURE — 700111 HCHG RX REV CODE 636 W/ 250 OVERRIDE (IP): Performed by: ANESTHESIOLOGY

## 2021-02-24 PROCEDURE — 700105 HCHG RX REV CODE 258: Performed by: STUDENT IN AN ORGANIZED HEALTH CARE EDUCATION/TRAINING PROGRAM

## 2021-02-24 PROCEDURE — 700101 HCHG RX REV CODE 250: Performed by: ANESTHESIOLOGY

## 2021-02-24 RX ORDER — CEFTRIAXONE 1 G/1
INJECTION, POWDER, FOR SOLUTION INTRAMUSCULAR; INTRAVENOUS PRN
Status: DISCONTINUED | OUTPATIENT
Start: 2021-02-24 | End: 2021-02-24 | Stop reason: SURG

## 2021-02-24 RX ORDER — METOCLOPRAMIDE HYDROCHLORIDE 5 MG/ML
INJECTION INTRAMUSCULAR; INTRAVENOUS PRN
Status: DISCONTINUED | OUTPATIENT
Start: 2021-02-24 | End: 2021-02-24 | Stop reason: SURG

## 2021-02-24 RX ORDER — ACETAMINOPHEN 325 MG/1
650 TABLET ORAL EVERY 6 HOURS PRN
Qty: 30 TABLET | Refills: 0 | COMMUNITY
Start: 2021-02-24 | End: 2022-02-28

## 2021-02-24 RX ORDER — ONDANSETRON 2 MG/ML
4 INJECTION INTRAMUSCULAR; INTRAVENOUS
Status: DISCONTINUED | OUTPATIENT
Start: 2021-02-24 | End: 2021-02-24 | Stop reason: HOSPADM

## 2021-02-24 RX ORDER — CEFAZOLIN SODIUM 1 G/3ML
INJECTION, POWDER, FOR SOLUTION INTRAMUSCULAR; INTRAVENOUS
Status: DISCONTINUED | OUTPATIENT
Start: 2021-02-24 | End: 2021-02-24 | Stop reason: HOSPADM

## 2021-02-24 RX ORDER — GLYCOPYRROLATE 0.2 MG/ML
INJECTION INTRAMUSCULAR; INTRAVENOUS PRN
Status: DISCONTINUED | OUTPATIENT
Start: 2021-02-24 | End: 2021-02-24 | Stop reason: SURG

## 2021-02-24 RX ORDER — OXYCODONE HYDROCHLORIDE AND ACETAMINOPHEN 5; 325 MG/1; MG/1
1 TABLET ORAL
Status: DISCONTINUED | OUTPATIENT
Start: 2021-02-24 | End: 2021-02-24 | Stop reason: HOSPADM

## 2021-02-24 RX ORDER — METHYLPREDNISOLONE ACETATE 40 MG/ML
INJECTION, SUSPENSION INTRA-ARTICULAR; INTRALESIONAL; INTRAMUSCULAR; SOFT TISSUE
Status: DISCONTINUED | OUTPATIENT
Start: 2021-02-24 | End: 2021-02-24 | Stop reason: HOSPADM

## 2021-02-24 RX ORDER — NEOSTIGMINE METHYLSULFATE 1 MG/ML
INJECTION, SOLUTION INTRAVENOUS PRN
Status: DISCONTINUED | OUTPATIENT
Start: 2021-02-24 | End: 2021-02-24 | Stop reason: SURG

## 2021-02-24 RX ORDER — ROCURONIUM BROMIDE 10 MG/ML
INJECTION, SOLUTION INTRAVENOUS PRN
Status: DISCONTINUED | OUTPATIENT
Start: 2021-02-24 | End: 2021-02-24 | Stop reason: SURG

## 2021-02-24 RX ORDER — SODIUM CHLORIDE, SODIUM LACTATE, POTASSIUM CHLORIDE, CALCIUM CHLORIDE 600; 310; 30; 20 MG/100ML; MG/100ML; MG/100ML; MG/100ML
INJECTION, SOLUTION INTRAVENOUS CONTINUOUS
Status: DISCONTINUED | OUTPATIENT
Start: 2021-02-24 | End: 2021-02-24 | Stop reason: HOSPADM

## 2021-02-24 RX ORDER — PHENYLEPHRINE HCL IN 0.9% NACL 0.5 MG/5ML
SYRINGE (ML) INTRAVENOUS PRN
Status: DISCONTINUED | OUTPATIENT
Start: 2021-02-24 | End: 2021-02-24 | Stop reason: SURG

## 2021-02-24 RX ORDER — SODIUM CHLORIDE, SODIUM LACTATE, POTASSIUM CHLORIDE, CALCIUM CHLORIDE 600; 310; 30; 20 MG/100ML; MG/100ML; MG/100ML; MG/100ML
INJECTION, SOLUTION INTRAVENOUS
Status: DISCONTINUED | OUTPATIENT
Start: 2021-02-24 | End: 2021-02-24 | Stop reason: SURG

## 2021-02-24 RX ORDER — OXYCODONE HYDROCHLORIDE AND ACETAMINOPHEN 5; 325 MG/1; MG/1
2 TABLET ORAL
Status: DISCONTINUED | OUTPATIENT
Start: 2021-02-24 | End: 2021-02-24 | Stop reason: HOSPADM

## 2021-02-24 RX ORDER — BUPIVACAINE HYDROCHLORIDE AND EPINEPHRINE 5; 5 MG/ML; UG/ML
INJECTION, SOLUTION EPIDURAL; INTRACAUDAL; PERINEURAL
Status: DISCONTINUED | OUTPATIENT
Start: 2021-02-24 | End: 2021-02-24 | Stop reason: HOSPADM

## 2021-02-24 RX ORDER — DIPHENHYDRAMINE HYDROCHLORIDE 50 MG/ML
INJECTION INTRAMUSCULAR; INTRAVENOUS PRN
Status: DISCONTINUED | OUTPATIENT
Start: 2021-02-24 | End: 2021-02-24 | Stop reason: SURG

## 2021-02-24 RX ADMIN — DOCUSATE SODIUM 50 MG AND SENNOSIDES 8.6 MG 2 TABLET: 8.6; 5 TABLET, FILM COATED ORAL at 17:46

## 2021-02-24 RX ADMIN — LACOSAMIDE 200 MG: 100 TABLET, FILM COATED ORAL at 04:30

## 2021-02-24 RX ADMIN — Medication 100 MCG: at 10:51

## 2021-02-24 RX ADMIN — CEFTRIAXONE SODIUM 2 G: 1 INJECTION, POWDER, FOR SOLUTION INTRAMUSCULAR; INTRAVENOUS at 10:09

## 2021-02-24 RX ADMIN — TIZANIDINE 4 MG: 4 TABLET ORAL at 03:23

## 2021-02-24 RX ADMIN — PROPOFOL 200 MCG/KG/MIN: 10 INJECTION, EMULSION INTRAVENOUS at 10:21

## 2021-02-24 RX ADMIN — DOCUSATE SODIUM 50 MG AND SENNOSIDES 8.6 MG 2 TABLET: 8.6; 5 TABLET, FILM COATED ORAL at 04:29

## 2021-02-24 RX ADMIN — PROPOFOL 150 MG: 10 INJECTION, EMULSION INTRAVENOUS at 10:20

## 2021-02-24 RX ADMIN — ROCURONIUM BROMIDE 40 MG: 10 INJECTION, SOLUTION INTRAVENOUS at 10:20

## 2021-02-24 RX ADMIN — SODIUM CHLORIDE, POTASSIUM CHLORIDE, SODIUM LACTATE AND CALCIUM CHLORIDE: 600; 310; 30; 20 INJECTION, SOLUTION INTRAVENOUS at 10:01

## 2021-02-24 RX ADMIN — OXYCODONE 5 MG: 5 TABLET ORAL at 17:46

## 2021-02-24 RX ADMIN — FENTANYL CITRATE 25 MCG: 50 INJECTION, SOLUTION INTRAMUSCULAR; INTRAVENOUS at 12:09

## 2021-02-24 RX ADMIN — NEOSTIGMINE METHYLSULFATE 3 MG: 1 INJECTION INTRAVENOUS at 11:48

## 2021-02-24 RX ADMIN — FENTANYL CITRATE 50 MCG: 50 INJECTION, SOLUTION INTRAMUSCULAR; INTRAVENOUS at 11:24

## 2021-02-24 RX ADMIN — DIPHENHYDRAMINE HYDROCHLORIDE 25 MG: 50 INJECTION, SOLUTION INTRAMUSCULAR; INTRAVENOUS at 11:36

## 2021-02-24 RX ADMIN — GABAPENTIN 900 MG: 300 CAPSULE ORAL at 03:23

## 2021-02-24 RX ADMIN — FENTANYL CITRATE 25 MCG: 50 INJECTION, SOLUTION INTRAMUSCULAR; INTRAVENOUS at 11:15

## 2021-02-24 RX ADMIN — CEFTRIAXONE SODIUM 1 G: 1 INJECTION, POWDER, FOR SOLUTION INTRAMUSCULAR; INTRAVENOUS at 04:30

## 2021-02-24 RX ADMIN — METOCLOPRAMIDE 10 MG: 5 INJECTION, SOLUTION INTRAMUSCULAR; INTRAVENOUS at 12:18

## 2021-02-24 RX ADMIN — GLYCOPYRROLATE 0.3 MG: 0.2 INJECTION INTRAMUSCULAR; INTRAVENOUS at 11:48

## 2021-02-24 RX ADMIN — PRENATAL WITH FERROUS FUM AND FOLIC ACID 1 TABLET: 3080; 920; 120; 400; 22; 1.84; 3; 20; 10; 1; 12; 200; 27; 25; 2 TABLET ORAL at 15:32

## 2021-02-24 RX ADMIN — FENTANYL CITRATE 25 MCG: 50 INJECTION, SOLUTION INTRAMUSCULAR; INTRAVENOUS at 11:37

## 2021-02-24 RX ADMIN — MORPHINE SULFATE 2 MG: 4 INJECTION INTRAVENOUS at 05:36

## 2021-02-24 RX ADMIN — OXYCODONE 5 MG: 5 TABLET ORAL at 04:29

## 2021-02-24 RX ADMIN — MORPHINE SULFATE 2 MG: 4 INJECTION INTRAVENOUS at 02:29

## 2021-02-24 RX ADMIN — FENTANYL CITRATE 150 MCG: 50 INJECTION, SOLUTION INTRAMUSCULAR; INTRAVENOUS at 10:20

## 2021-02-24 RX ADMIN — FENTANYL CITRATE 25 MCG: 50 INJECTION, SOLUTION INTRAMUSCULAR; INTRAVENOUS at 12:01

## 2021-02-24 RX ADMIN — OXYCODONE 5 MG: 5 TABLET ORAL at 01:12

## 2021-02-24 RX ADMIN — LACOSAMIDE 200 MG: 100 TABLET, FILM COATED ORAL at 17:46

## 2021-02-24 ASSESSMENT — PAIN DESCRIPTION - PAIN TYPE
TYPE: SURGICAL PAIN
TYPE: ACUTE PAIN
TYPE: ACUTE PAIN
TYPE: SURGICAL PAIN;ACUTE PAIN
TYPE: SURGICAL PAIN
TYPE: SURGICAL PAIN
TYPE: ACUTE PAIN
TYPE: SURGICAL PAIN
TYPE: ACUTE PAIN

## 2021-02-24 ASSESSMENT — PAIN SCALES - GENERAL: PAIN_LEVEL: 3

## 2021-02-24 NOTE — CARE PLAN
Problem: Safety  Goal: Free from accidental injury  Outcome: PROGRESSING AS EXPECTED  [place] fall precautions in place; bed alarm also on     Problem: Pain  Goal: Alleviation of Pain or a reduction in pain to the patient's comfort goal  Outcome: PROGRESSING AS EXPECTED   PRN pain meds in use

## 2021-02-24 NOTE — ANESTHESIA PROCEDURE NOTES
Airway    Date/Time: 2/24/2021 10:23 AM  Performed by: Octavio Fitch D.O.  Authorized by: Octavio Fitch D.O.     Location:  OR  Urgency:  Elective  Indications for Airway Management:  Anesthesia      Spontaneous Ventilation: absent    Sedation Level:  Deep  Preoxygenated: Yes    Patient Position:  Sniffing  Mask Difficulty Assessment:  1 - vent by mask  Final Airway Type:  Endotracheal airway  Final Endotracheal Airway:  ETT  Cuffed: Yes    Technique Used for Successful ETT Placement:  Direct laryngoscopy    Insertion Site:  Oral  Blade Type:  Dimitris  Laryngoscope Blade/Videolaryngoscope Blade Size:  3  ETT Size (mm):  7.0  Measured from:  Lips  ETT to Lips (cm):  21  Placement Verified by: auscultation and capnometry    Cormack-Lehane Classification:  Grade I - full view of glottis  Number of Attempts at Approach:  1

## 2021-02-24 NOTE — CARE PLAN
Problem: Safety  Goal: Free from accidental injury  Outcome: PROGRESSING AS EXPECTED  Goal: Free from intentional harm  Outcome: PROGRESSING AS EXPECTED  Goal: Free from self harm  Outcome: PROGRESSING AS EXPECTED  Goal: Isolation Precautions for patient and staff safety  Outcome: PROGRESSING AS EXPECTED     Problem: Knowledge Deficit  Goal: Patient/Significant other demonstrates understanding of disease process, treatment plan, medications and discharge instructions  Outcome: PROGRESSING AS EXPECTED     Problem: Psychosocial needs  Goal: Spiritual needs incorporated in hospitalization  Outcome: PROGRESSING AS EXPECTED  Goal: Cultural needs incorporated in hospitalization  Outcome: PROGRESSING AS EXPECTED  Goal: Anxiety reduction  Outcome: PROGRESSING AS EXPECTED     Problem: Discharge Barriers/Planning  Goal: Patient's Continuum of care needs are met  Outcome: PROGRESSING AS EXPECTED     Problem: Skin Integrity  Goal: Skin Integrity is maintained or improved  Outcome: PROGRESSING AS EXPECTED     Problem: Risk for Impaired Mobility  Goal: Mobilize and/or Transfer Safely with Maximum [place]  Outcome: PROGRESSING AS EXPECTED  Goal: Activity Level appropriate for Discharge or Transfer  Outcome: PROGRESSING AS EXPECTED     Problem: Oxygenation/Respiratory Function  Goal: Patient will Achieve/Maintain Normal Respiratory Rate/Effort  Outcome: PROGRESSING AS EXPECTED     Problem: Pain  Goal: Alleviation of Pain or a reduction in pain to the patient's comfort goal  Outcome: PROGRESSING AS EXPECTED     Problem: Risk for Deep Vein Thrombosis/Venous Thromboembolism  Goal: DVT/VTE Prevention Measures in Place  Outcome: PROGRESSING AS EXPECTED  Goal: Patient participates in DVT/VTE Prevention Measures  Outcome: PROGRESSING AS EXPECTED     Problem: Nutrition Deficit  Goal: Adequate Food and Fluid Intake  Outcome: PROGRESSING AS EXPECTED     Problem: Self Care Deficit  Goal: Ability to bathe, groom and dress independently  or with assistance  Outcome: PROGRESSING AS EXPECTED  Goal: Ability to feed and maintain oral hygiene independently or with assistance  Outcome: PROGRESSING AS EXPECTED  Goal: Ability to toilet independently or with assistance  Outcome: PROGRESSING AS EXPECTED     Problem: Elimination  Goal: Establishment and Maintenance of regular bowel elimination  Outcome: PROGRESSING AS EXPECTED  Goal: Regular urinary elimination  Outcome: PROGRESSING AS EXPECTED     Problem: Risk for injury related to physical restraint use  Goal: Safe and appropriate use of physical restraints. Restraints discontinued at the earliest possibility while ensuring patient safety.  Outcome: PROGRESSING AS EXPECTED     Problem: Communication  Goal: The ability to communicate needs accurately and effectively will improve  Outcome: PROGRESSING AS EXPECTED     Problem: Safety  Goal: Will remain free from injury  Outcome: PROGRESSING AS EXPECTED  Goal: Will remain free from falls  Outcome: PROGRESSING AS EXPECTED     Problem: Infection  Goal: Will remain free from infection  Outcome: PROGRESSING AS EXPECTED     Problem: Venous Thromboembolism (VTW)/Deep Vein Thrombosis (DVT) Prevention:  Goal: Patient will participate in Venous Thrombosis (VTE)/Deep Vein Thrombosis (DVT)Prevention Measures  Outcome: PROGRESSING AS EXPECTED     Problem: Bowel/Gastric:  Goal: Normal bowel function is maintained or improved  Outcome: PROGRESSING AS EXPECTED  Goal: Will not experience complications related to bowel motility  Outcome: PROGRESSING AS EXPECTED     Problem: Knowledge Deficit  Goal: Knowledge of disease process/condition, treatment plan, diagnostic tests, and medications will improve  Outcome: PROGRESSING AS EXPECTED  Goal: Knowledge of the prescribed therapeutic regimen will improve  Outcome: PROGRESSING AS EXPECTED     Problem: Discharge Barriers/Planning  Goal: Patient's continuum of care needs will be met  Outcome: PROGRESSING AS EXPECTED     Problem: Pain  Management  Goal: Pain level will decrease to patient's comfort goal  Outcome: PROGRESSING AS EXPECTED     Problem: Mobility  Goal: Risk for activity intolerance will decrease  Outcome: PROGRESSING AS EXPECTED

## 2021-02-24 NOTE — OR NURSING
"Patient recovered well in post-op. AAOx4. VSS, on RA. Surgical sites CDI, pink. No surgical dressing. Surgical pain managed through rest and repositioning. Per patient, pain 2/10 \"slight ache\" throughout recovery and did not require pain medication. BLE warm/pink, cap refill < 3 seconds, sensation intact, strength 5/5, bilateral pedal pulses 2+. Patient able to intake fluids without nausea. Spouse updated and discussed POC. No patient belongings in recovery. Report called to ALENA Bustillos. Awaiting transport.   "

## 2021-02-24 NOTE — PROGRESS NOTES
Patient up to floor from MRI. All belongings with patient. Alert and oriented. Patient moved from gurney to bed self. Armband not scanning. New one ordered. Patient states pain is tolerable at this moment.

## 2021-02-24 NOTE — ANESTHESIA PREPROCEDURE EVALUATION
Relevant Problems   NEURO   (+) Seizure disorder (HCC)      CARDIAC   (+) Chronic migraine       Physical Exam    Airway   Mallampati: II  TM distance: >3 FB  Neck ROM: full       Cardiovascular - normal exam  Rhythm: regular  Rate: normal  (-) murmur     Dental - normal exam           Pulmonary - normal exam  Breath sounds clear to auscultation     Abdominal    Neurological - normal exam                 Anesthesia Plan    ASA 2       Plan - general       Airway plan will be ETT          Induction: intravenous    Postoperative Plan: Postoperative administration of opioids is intended.    Pertinent diagnostic labs and testing reviewed    Informed Consent:    Anesthetic plan and risks discussed with patient.    Use of blood products discussed with: patient whom consented to blood products.

## 2021-02-24 NOTE — PROGRESS NOTES
Called report to Brayan on Ortho Spine since the patient will be going to room 332 Bed 1. After she is finished with MRI.

## 2021-02-24 NOTE — OP REPORT
NEUROSURGERY OPERATIVE NOTE  DATE:  12:39 PM 2021    PATIENT NAME:  Roxanna Guzmán   1985 MRN [id number]      PROCEDURE:  1.  Left Lumbar 5/sacral 1 laminotomy, diskectomy  2.  Left lumbar 5/sacral 1 foramintomy    Surgeon:  Indra Goode MD, PhD  Assistant:EDGARDO Kinney    Anesthesia:  GETA    Diagnosis:  Lumbar radiculopathy, herniated disk    Indication:  35 year old female with progressive intractable left leg pain.  MRI demonstrates large free disc fragment involving the left L5/S1 region with nerve root compression.  She has failed medical management.  Given this, laminotomy and discectomy is planned.  She is in an early pregnancy, risk of anesthesia and x-ray exposure was reviewed extensively with her.  She expressed understanding these issues and wishes to proceed.    Procedure:  The patient was identified in the holding area, and the surgery site marked, consent was obtained.  The patinet was brought back to the operating room and intubated by anesthesia service.  2 grams Ancef was administered intravenously.  She was transferred to the OSI operating room table in a prone manner and all pressure points well padded.  Their lumbar region was prepped with hair clipping, chlorhexidine and betadine scrub, and Chloroprep.  Sterile drapes were applied including a layer of [place]oban.  The correct vertebral levels were identified flouroscopically. The planned midline incision was infiltrated with 0.5% Marcaine/epinephrine.  The skin was incised sharply and dissection carried deeply using monopolar cautery.  The L5/S1 level was verified fluoroscopically.  The Versatrac retractor system was placed.  The inferior L5 laminotomy was created using the high-speed drill fit with a AMA drill bit.  The ligamentum flavum was divided using a Penfield 4 dissector, and removed using Kerrison rongeurs.  Foraminotomies was then performed at this time.  A very large disc fragment was extruding through the  opening, with the thecal sac deviated medially along the disc herniation.  A nerve root retractor was placed, and all tissue swept off of the overlying disc herniation.  The disc herniation was rupturing through the annulus, the annulus was opened sharply and an approximate 2 cm disc fragment removed.  Multiple other disc fragments were removed as well.  The disc space was explored with a down-biting curette, and pituitary rongeur.  A few more small pieces disc material were removed.  The foramen was probed with a nerve hook, and no further disc fragments or compression noted.  Good hemostasis was noted at this time.  Surgi-Eliot was placed and tamponade held with a cottonoid tyler for several minutes.  The cottonoid tyler was removed and the Surgi-Eliot irrigated out gently with antibiotic irrigation.  Gelfoam soaked in 20 mcg of Depo-Medrol was placed over the exposed thecal sac.  The Versatrac retractor system was removed.    The lumbar fascia was reapproximated with 1-0 Vicryl suture in an interrupted manner.  The dermis was reapproximated with 3-0 Vicryl suture in an inverted interrupted manner.  The skin was reapproximated with 4-0 Monocryl in a running subcuticular manner followed by Dermabond.  Final counts were correct.    FINDINGS:  Significant central canal stenosis, good decompression obtained with improved neurophysiology signal following laminectomy.  SPECIMEN:  None  DRAINS:  #7 flat to EMMA bulb  EBL:  <50 CC  COMPLICATIONS:  None apparent at end of procedure.

## 2021-02-24 NOTE — ANESTHESIA POSTPROCEDURE EVALUATION
Patient: Roxanna Guzmán    Procedure Summary     Date: 02/24/21 Room / Location: [place] [address] / SURGERY [facility]    Anesthesia Start: 1009 Anesthesia Stop: 1241    Procedure: LAMINECTOMY, SPINE, LUMBAR, WITH [id number] (Left Spine Lumbar) Diagnosis: (left L5/S1 herniated disk)    Surgeons: Indra Goode M.D. Responsible Provider: Octavio Fitch D.O.    Anesthesia Type: general ASA Status: 2          Final Anesthesia Type: general  Last vitals  BP   Blood Pressure: (!) 98/52    Temp   36.3 °C (97.4 °F)    Pulse   70   Resp   13    SpO2   99 %      Anesthesia Post Evaluation    Patient location during evaluation: PACU  Patient participation: complete - patient participated  Level of consciousness: awake and alert  Pain score: 3    Airway patency: patent  Anesthetic complications: no  Cardiovascular status: hemodynamically stable  Respiratory status: acceptable  Hydration status: euvolemic    PONV: none          No complications documented.     Nurse Pain Score: 9 (NPRS)

## 2021-02-24 NOTE — ANESTHESIA TIME REPORT
Anesthesia Start and Stop Event Times     Date Time Event    2/24/2021 0856 Ready for Procedure     1009 Anesthesia Start     1241 Anesthesia Stop        Responsible Staff  02/24/21    Name Role Begin End    Octavio Fitch D.O. Anesth 1009 1241        Preop Diagnosis (Free Text):  Pre-op Diagnosis     left L5/S1 herniated disk        Preop Diagnosis (Codes):    Post op Diagnosis  Lumbar herniated disc      Premium Reason  Non-Premium    Comments:

## 2021-02-25 ENCOUNTER — APPOINTMENT (OUTPATIENT)
Dept: ADMISSIONS | Facility: MEDICAL CENTER | Age: 36
End: 2021-02-25
Payer: COMMERCIAL

## 2021-02-25 NOTE — DISCHARGE INSTRUCTIONS
Lumbar Diskectomy    Lumbar diskectomy is a surgery to treat a damaged disk in the lower back. Disks are oval-shaped layers of connective tissue (cartilage) between the bones in the spinal column (vertebrae). Disks prevent the vertebrae from rubbing together. A disk can tear and bulge outward (become herniated). This puts pressure on nerves that are near the spine and may cause pain, numbness, weakness, or other symptoms.  You may need this surgery if your symptoms are severe, get worse, or have not been helped by other treatments. In this procedure, a surgeon will remove the part of the disk that is causing problems.  Tell a health care provider about:  · Any allergies you have.  · All medicines you are taking, including vitamins, herbs, eye drops, creams, and over-the-counter medicines.  · Any problems you or family members have had with anesthetic medicines.  · Any blood disorders you have.  · Any surgeries you have had.  · Any medical conditions you have.  · Whether you are pregnant or may be pregnant.  What are the risks?  Generally, this is a safe procedure. However, problems may occur, including:  · Bleeding.  · Infection.  · Allergic reactions to medicines.  · Damage to other structures or organs, such as nerves or the spinal cord. The damage may cause:  ? Loss of bladder or bowel control (incontinence).  ? Worsening pain.  ? Numbness and weakness in your lower body.  · Failure to relieve your symptoms.  What happens before the procedure?  Staying hydrated  Follow instructions from your health care provider about hydration, which may include:  · Up to 2 hours before the procedure - you may continue to drink clear liquids, such as water, clear fruit juice, black coffee, and plain tea.  Eating and drinking restrictions  Follow instructions from your health care provider about eating and drinking, which may include:  · 8 hours before the procedure - stop eating heavy meals or foods such as meat, fried foods, or  fatty foods.  · 6 hours before the procedure - stop eating light meals or foods, such as toast or cereal.  · 6 hours before the procedure - stop drinking milk or drinks that contain milk.  · 2 hours before the procedure - stop drinking clear liquids.  Medicines  · Ask your health care provider about:  ? Changing or stopping your regular medicines. This is especially important if you are taking diabetes medicines or blood thinners.  ? Taking medicines such as aspirin and ibuprofen. These medicines can thin your blood. Do not take these medicines unless your health care provider tells you to take them.  ? Taking over-the-counter medicines, vitamins, herbs, and supplements.  · You may be given antibiotic medicine to help prevent an infection.  General instructions  · You may have an imaging study of your spine, such as an MRI or CT scan, to help plan the procedure.  · Do not drink alcohol.  · Do not use any products that contain nicotine or tobacco, such as cigarettes and e-cigarettes. These can delay bone healing. If you need help quitting, ask your health care provider.  · Plan to have someone take you home from the hospital.  · Plan to have a responsible adult care for you for at least 24 hours after you leave the hospital. This is important.  · Ask your health care provider how your surgical site will be marked or identified.  · You may be asked to shower with a germ-killing soap.  What happens during the procedure?  · To lower your risk of infection:  ? Your health care team will wash or sanitize their hands.  ? Hair may be removed from the surgical area.  ? Your skin will be washed with soap.  · An IV will be inserted into one of your veins.  · You will be given one or more of the following:  ? A medicine to help you relax (sedative).  ? A medicine to numb the area (local anesthetic).  ? A medicine to make you fall asleep (general anesthetic).  ? A medicine that is injected into your spine to numb the area below  and slightly above the injection site (spinal anesthetic).  · You will be positioned face-down on the operating table.  · Your surgeon will make an incision over your spine.  · Your surgeon may move muscles and nerves so the injured disk can be seen easily.  · Your surgeon may need to remove some connective tissue (ligaments) or pieces of bone to get to your disk.  · Your surgeon will remove the part of the disk that is causing problems.  · The muscles and nerves will be put back in their normal position.  · The incision will be closed with stitches (sutures) or staples.  · A bandage (dressing) will be placed over the incision.  The procedure may vary among health care providers and hospitals.  What happens after the procedure?  · Your blood pressure, heart rate, breathing rate, and blood oxygen level will be monitored until the medicines you were given have worn off.  · Your IV may be removed when you are able to drink fluids on your own.  · You will be encouraged to get up and walk around as soon as you can.  · You may meet with a physical therapist to talk about recovering at home.  · You may have to wear compression stockings. These stockings help to prevent blood clots and reduce swelling in your legs.  · Do not drive for 24 hours if you were given a sedative during your procedure.  Summary  · Lumbar diskectomy is a surgery to treat a damaged disk in the lower back. You may need this procedure if you have pain, numbness, weakness, or other severe symptoms that have not been helped by other treatments.  · Generally, this is a safe procedure, but problems may occur, including damage to nerves or the spinal cord or failure to relieve your symptoms.  · Before the procedure, tell your health care provider about your complete medical history, including all medicines you are taking, any allergies to medicines, medical conditions, previous surgeries, and problems you have had with anesthetic medicines.  · Before the  procedure, follow instructions from your health care provider about eating and drinking and about changing or stopping your regular medicines.  · Follow instructions from your health care provider about doing physical therapy after the procedure.  This information is not intended to replace advice given to you by your health care provider. Make sure you discuss any questions you have with your health care provider.  Document Released: 05/03/2016 Document Revised: 11/30/2018 Document Reviewed: 09/20/2018  [employer] Patient Education © 2020 [employer]  Laminectomy, Care After  This sheet gives you information about how to care for yourself after your procedure. Your health care provider may also give you more specific instructions. If you have problems or questions, contact your health care provider.  What can I expect after the procedure?  After the procedure, it is common to have:  · Some pain around your incision area.  · Muscle tightening (spasms) across the back.  Follow these instructions at home:  Incision care    · Follow instructions from your health care provider about how to take care of your incision area. Make sure you:  ? Wash your hands with soap and water before and after you apply medicine to the area or change your bandage (dressing). If soap and water are not available, use hand .  ? Change your dressing as told by your health care provider.  ? Leave stitches (sutures), skin glue, or adhesive strips in place. These skin closures may need to stay in place for 2 weeks or longer. If adhesive strip edges start to loosen and curl up, you may trim the loose edges. Do not remove adhesive strips completely unless your health care provider tells you to do that.  · Check your incision area every day for signs of infection. Check for:  ? More redness, swelling, or pain.  ? More fluid or blood.  ? Warmth.  ? Pus or a bad smell.  Medicines  · Take over-the-counter and prescription medicines only as  told by your health care provider.  · If you were prescribed an antibiotic medicine, use it as told by your health care provider. Do not stop using the antibiotic even if you start to feel better.  Bathing  · Do not take baths, swim, or use a hot tub for 2 weeks, or until your incision has healed completely.  · If your health care provider approves, you may take showers after your dressing has been removed.  Activity    · Return to your normal activities as told by your health care provider. Ask your health care provider what activities are safe for you.  · Avoid bending or twisting at your waist. Always bend at your knees.  · Do not sit for more than 20-30 minutes at a time. Lie down or walk between periods of sitting.  · Do not lift anything that is heavier than 10 lb (4.5 kg) or the limit that your health care provider tells you, until he or she says that it is safe.  · Do not drive for 2 weeks after your procedure or for as long as your health care provider tells you.  · Do not drive or use heavy machinery while taking prescription pain medicine.  General instructions  · To prevent or treat constipation while you are taking prescription pain medicine, your health care provider may recommend that you:  ? Drink enough fluid to keep your urine clear or pale yellow.  ? Take over-the-counter or prescription medicines.  ? Eat foods that are high in fiber, such as fresh fruits and vegetables, whole grains, and beans.  ? Limit foods that are high in fat and processed sugars, such as fried and sweet foods.  · Do breathing exercises as told.  · Keep all follow-up visits as told by your health care provider. This is important.  Contact a health care provider if:  · You have more redness, swelling, or pain around your incision area.  · Your incision feels warm to the touch.  · You are not able to return to activities or do exercises as told by your health care provider.  Get help right away if:  · You have:  ? More fluid or  blood coming from your incision area.  ? Pus or a bad smell coming from your incision area.  ? Chills or a fever.  ? Episodes of dizziness or fainting while standing.  · You develop a rash.  · You develop shortness of breath or you have difficulty breathing.  · You cannot control when you urinate or have a bowel movement.  · You become weak.  · You are not able to use your legs.  Summary  · After the procedure, it is common to have some pain around your incision area. You may also have muscle tightening (spasms) across the back.  · Follow instructions from your health care provider about how to care for your incision.  · Do not lift anything that is heavier than 10 lb (4.5 kg) or the limit that your health care provider tells you, until he or she says that it is safe.  · Contact your health care provider if you have more redness, swelling, or pain around your incision area or if your incision feels warm to the touch. These can be signs of infection.  This information is not intended to replace advice given to you by your health care provider. Make sure you discuss any questions you have with your health care provider.  Document Released: 07/07/2006 Document Revised: 11/30/2018 Document Reviewed: 06/04/2017  [employer] Patient Education © 2020 [employer]  Discharge Instructions    Discharged to home by car with relative. Discharged via wheelchair, hospital escort: Yes.  Special equipment needed: Not Applicable    Be sure to schedule a follow-up appointment with your primary care doctor or any specialists as instructed.     Discharge Plan:   Influenza Vaccine Indication: Patient Refuses    I understand that a diet low in cholesterol, fat, and sodium is recommended for good health. Unless I have been given specific instructions below for another diet, I accept this instruction as my diet prescription.   Other diet: Regular    Special Instructions: None    · Is patient discharged on Warfarin / Coumadin?   No      Depression / Suicide Risk    As you are discharged from this [facility] facility, it is important to learn how to keep safe from harming yourself.    Recognize the warning signs:  · Abrupt changes in personality, positive or negative- including increase in energy   · Giving away possessions  · Change in eating patterns- significant weight changes-  positive or negative  · Change in sleeping patterns- unable to sleep or sleeping all the time   · Unwillingness or inability to communicate  · Depression  · Unusual sadness, discouragement and loneliness  · Talk of wanting to die  · Neglect of personal appearance   · Rebelliousness- reckless behavior  · Withdrawal from people/activities they love  · Confusion- inability to concentrate     If you or a loved one observes any of these behaviors or has concerns about self-harm, here's what you can do:  · Talk about it- your feelings and reasons for harming yourself  · Remove any means that you might use to hurt yourself (examples: pills, rope, extension cords, firearm)  · Get professional help from the community (Mental Health, Substance Abuse, psychological counseling)  · Do not be alone:Call your Safe Contact- someone whom you trust who will be there for you.  · Call your local CRISIS HOTLINE 410-5977 or 440-401-6132  · Call your local Children's Mobile Crisis Response Team [place] (062) 688-9594 or www.B[employer]cept[employer]  · Call the toll free National Suicide Prevention Hotlines   · National Suicide Prevention Lifeline 045-607-ZZCG (3756)  · National Hope Line Network 800-SUICIDE (268-3810)

## 2021-03-07 LAB — TEST NAME 95000: NORMAL

## 2021-04-07 NOTE — DISCHARGE SUMMARY
"Discharge Summary    CHIEF COMPLAINT ON ADMISSION  Chief Complaint   Patient presents with   • Back Pain       Reason for Admission  Back Pain     Admission Date  2/22/2021    CODE STATUS  Prior    HPI & HOSPITAL COURSE  For full details of admission please see the H&P of Dr. Bell dated 2/22/2021, briefly, \"35 y.o. female with past medical history of partial complex seizure, chronic migraine who presented 2/22/2021 with acute worsening of low back pain with left leg radiculopathy.      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 Corbett last week however this was postponed due to the patient becoming pregnant. She did see OBGYN for clearance, she is 8 weeks pregnancy and she had no abnormalities on US, she was given information regarding risks of surgery but was told it was okay to do so, she had elected to go through with surgery which is scheduled for March 1, 2021. However her pain has continued to worsen and she is unable to ambulate independently due to this. She states that saturday she did have a great day and was able to stretch and take her dog for a walk however Sunday pain was increased significantly and this morning the pain wrapped around into her pelvis and pubic region and was associated with some tingling thus she presented. She denies bowel or bladder incontinence but does have to strain to get her urine stream initiated which is new.   Her  who is present states she has been unable to work for the last 1 month and he is having to carry her around to use the restroom because she cannot walk.      Patient has had 2 epidural/spinal blocks, she states the most recent was done on February 1, and was successful for about 1-2 days but them pain removed. She has also had a medrol dose pack between these blocks which did not have much effect.      On arrival patient is very uncomfortable, lying " "prone is the only position in which she finds some comfort.   She is afebrile, tachycardic with , RR 24, /99, saturating well on RA.   Labs are within normal limits, pregnancy test is confirmed positive.   UA does show ketones, small occult blood, trace LE, WBC 20-50 with few bacteria. \"    Patient was taken to the operating room by neurosurgery, full report below.    Surgery was well-tolerated and patient was actually discharged home from PACU.  Therefore, she is discharged in good and stable condition to home with close outpatient follow-up.      Discharge Date  2/24/2021    FOLLOW UP ITEMS POST DISCHARGE  She is to follow-up with neurosurgery as instructed.    DISCHARGE DIAGNOSES  Principal Problem:    Lumbosacral radiculopathy due to L4-5 disc herniation with foraminal stenosis POA: Yes  Active Problems:    Bacteriuria with pyuria in pregnancy POA: Yes    Hyponatremia POA: No    Seizure disorder (HCC) POA: Yes    8 weeks gestation of pregnancy POA: Yes  Resolved Problems:    * No resolved hospital problems. *      FOLLOW UP  No future appointments.  Chapis Alexis, A.P.R.N.  [address]  [facility] [address]  [phone]            MEDICATIONS ON DISCHARGE     Medication List      START taking these medications      Instructions   acetaminophen 325 MG Tabs  Commonly known as: Tylenol   Take 2 Tablets by mouth every 6 hours as needed (Mild Pain; (Pain scale 1-3); Temp greater than 100.5 F).  Dose: 650 mg        CHANGE how you take these medications      Instructions   gabapentin 300 MG Caps  What changed: when to take this  Commonly known as: NEURONTIN   Take 3 Caps by mouth 3 times a day as needed (pain).  Dose: 900 mg     rizatriptan 10 MG disintegrating tablet  What changed:   · how much to take  · how to take this  · when to take this  · reasons to take this  · additional instructions  Commonly known as: MAXALT-MLT   Take 1/2-1 tablet po at onset of headache, may repeat dose in 2 " hours if unrelieved.  Do not exceed more than 2 tablets in 24 hours.        CONTINUE taking these medications      Instructions   cyclobenzaprine 5 mg tablet  Commonly known as: Flexeril   Take 5-10 mg by mouth 3 times a day as needed for Muscle Spasms.  Dose: 5-10 mg     Emgality 120 MG/ML Soaj  Generic drug: Galcanezumab-gnlm   Inject 120 mg as instructed Q30 DAYS.  Dose: 120 mg     fluticasone 50 MCG/ACT nasal spray  Commonly known as: Flonase   Spray 1 Spray in nose every day.  Dose: 1 Spray     lacosamide 200 MG Tabs tablet  Commonly known as: VIMPAT   Take 200 mg by mouth 2 Times a Day.  Dose: 200 mg     oxyCODONE-acetaminophen 5-325 MG Tabs  Commonly known as: PERCOCET   Take 1 tablet by mouth every four hours as needed for Moderate Pain.  Dose: 1 tablet     prenatal plus vitamin 27-1 MG Tabs tablet   Take 1 tablet by mouth.  Dose: 1 tablet            Allergies  No Known Allergies    DIET  No orders of the defined types were placed in this encounter.      ACTIVITY  As instructed by neurosurgery.    CONSULTATIONS  MICHAELA Garcia    PROCEDURES  1.  Left Lumbar 5/sacral 1 laminotomy, diskectomy  2.  Left lumbar 5/sacral 1 foramintomy     Surgeon:  Indra Goode MD, PhD  Assistant:EDGARDO Kinney     Anesthesia:  GETA     Diagnosis:  Lumbar radiculopathy, herniated disk     Indication:  35 year old female with progressive intractable left leg pain.  MRI demonstrates large free disc fragment involving the left L5/S1 region with nerve root compression.  She has failed medical management.  Given this, laminotomy and discectomy is planned.  She is in an early pregnancy, risk of anesthesia and x-ray exposure was reviewed extensively with her.  She expressed understanding these issues and wishes to proceed.     Procedure:  The patient was identified in the holding area, and the surgery site marked, consent was obtained.  The patinet was brought back to the operating room and intubated by anesthesia service.  2  grams Ancef was administered intravenously.  She was transferred to the OSI operating room table in a prone manner and all pressure points well padded.  Their lumbar region was prepped with hair clipping, chlorhexidine and betadine scrub, and Chloroprep.  Sterile drapes were applied including a layer of Ioban.  The correct vertebral levels were identified flouroscopically. The planned midline incision was infiltrated with 0.5% Marcaine/epinephrine.  The skin was incised sharply and dissection carried deeply using monopolar cautery.  The L5/S1 level was verified fluoroscopically.  The Versatrac retractor system was placed.  The inferior L5 laminotomy was created using the high-speed drill fit with a [employer] drill bit.  The ligamentum flavum was divided using a Penfield 4 dissector, and removed using Kerrison rongeurs.  Foraminotomies was then performed at this time.  A very large disc fragment was extruding through the opening, with the thecal sac deviated medially along the disc herniation.  A nerve root retractor was placed, and all tissue swept off of the overlying disc herniation.  The disc herniation was rupturing through the annulus, the annulus was opened sharply and an approximate 2 cm disc fragment removed.  Multiple other disc fragments were removed as well.  The disc space was explored with a down-biting curette, and pituitary rongeur.  A few more small pieces disc material were removed.  The foramen was probed with a nerve hook, and no further disc fragments or compression noted.  Good hemostasis was noted at this time.  Surgi-Eliot was placed and tamponade held with a cottonoid tyler for several minutes.  The cottonoid tyler was removed and the Surgi-Eliot irrigated out gently with antibiotic irrigation.  Gelfoam soaked in 20 mcg of Depo-Medrol was placed over the exposed thecal sac.  The Versatrac retractor system was removed.     The lumbar fascia was reapproximated with 1-0 Vicryl suture in an interrupted  manner.  The dermis was reapproximated with 3-0 Vicryl suture in an inverted interrupted manner.  The skin was reapproximated with 4-0 Monocryl in a running subcuticular manner followed by Dermabond.  Final counts were correct.     FINDINGS:  Significant central canal stenosis, good decompression obtained with improved neurophysiology signal following laminectomy.  SPECIMEN:  None  DRAINS:  #7 flat to EMMA bulb  EBL:  <50 CC  COMPLICATIONS:  None apparent at end of procedure.    LABORATORY  Lab Results   Component Value Date    SODIUM 129 (L) 02/23/2021    POTASSIUM 3.5 (L) 02/23/2021    CHLORIDE 101 02/23/2021    CO2 22 02/23/2021    GLUCOSE 110 (H) 02/23/2021    BUN 9 02/23/2021    CREATININE 0.48 (L) 02/23/2021        Lab Results   Component Value Date    WBC 10.4 02/23/2021    HEMOGLOBIN 13.1 02/23/2021    HEMATOCRIT 37.6 02/23/2021    PLATELETCT 297 02/23/2021

## 2021-04-15 ENCOUNTER — HOSPITAL ENCOUNTER (OUTPATIENT)
Facility: MEDICAL CENTER | Age: 36
End: 2021-04-15
Attending: OBSTETRICS & GYNECOLOGY
Payer: COMMERCIAL

## 2021-04-15 PROCEDURE — 82105 ALPHA-FETOPROTEIN SERUM: CPT

## 2021-04-19 LAB
# FETUSES US: NORMAL
AFP MOM SERPL: 1.62
AFP SERPL-MCNC: 48 NG/ML
AGE - REPORTED: 37.1 YR
CURRENT SMOKER: NO
FAMILY MEMBER DISEASES HX: NO
GA METHOD: NORMAL
GA: NORMAL WK
IDDM PATIENT QL: NO
INTEGRATED SCN PATIENT-IMP: NORMAL
SPECIMEN DRAWN SERPL: NORMAL

## 2021-06-01 ENCOUNTER — OFFICE VISIT (OUTPATIENT)
Dept: NEUROLOGY | Facility: MEDICAL CENTER | Age: 36
End: 2021-06-01
Attending: PSYCHIATRY & NEUROLOGY
Payer: COMMERCIAL

## 2021-06-01 VITALS
SYSTOLIC BLOOD PRESSURE: 100 MMHG | WEIGHT: 167.55 LBS | HEART RATE: 82 BPM | BODY MASS INDEX: 24.82 KG/M2 | TEMPERATURE: 97.3 F | OXYGEN SATURATION: 100 % | DIASTOLIC BLOOD PRESSURE: 68 MMHG | HEIGHT: 69 IN

## 2021-06-01 DIAGNOSIS — G40.909 SEIZURE DISORDER (HCC): ICD-10-CM

## 2021-06-01 PROBLEM — H92.09 OTALGIA: Status: ACTIVE | Noted: 2018-02-27

## 2021-06-01 PROCEDURE — 99211 OFF/OP EST MAY X REQ PHY/QHP: CPT | Performed by: PSYCHIATRY & NEUROLOGY

## 2021-06-01 PROCEDURE — 99215 OFFICE O/P EST HI 40 MIN: CPT | Performed by: PSYCHIATRY & NEUROLOGY

## 2021-06-01 RX ORDER — ASPIRIN 81 MG/1
TABLET ORAL DAILY
Status: ON HOLD | COMMUNITY
End: 2021-10-08

## 2021-06-01 ASSESSMENT — FIBROSIS 4 INDEX: FIB4 SCORE: 0.5

## 2021-06-01 NOTE — PROGRESS NOTES
[facility] NEUROLOGY  NEW PATIENT VISIT    Referral source: ?    CC: migraine    HISTORY OF ILLNESS:  Roxanna Guzmán is a 35 y.o. woman with a history most notable for chronic migraine.  Today, she was unaccompanied, and she provided the following history:    Headaches:  2/2021:  Roxanna developed a herniated intervertebral disc.  She underwent partial discectomy.  During this process she discovered she was pregnant.  Her mediations for migraine prophylaxis (Botox) were paused.  Since that time she has established care with OB and high-risk OB.    The following is a summary of headache symptoms, presented in my standard format:    Family History:   Age at onset:   Location: right fronto-temporal  Radiation: sometimes becomes bilateral  Frequency: 3-4 times/week  Duration: if rescue medications are helpful, then 2 hours; if not helpful, up to 1 day  Headache Days/Month:   Quality:   Intensity: 7-10/10  Aura: none  Photophobia/Phonophobia/Nausea/Vomiting: yes/yes/no/no  Provoked by Physical Activity?:   Triggers:   Associated Symptoms:   Autonomic Signs (such as ptosis, miosis, conjunctival injection, rhinorrhea, increased lacrimation):   Head Trauma:   Association with Menses:   ED Visits:   Hospitalizations:   Missed Work Days:  Sleep: 7-8 hours/night  Caffeine Intake: 1 cup/day and a mini Coke in the afternoon  Hydration: stays well-hydrated  Nutrition: doesn't skip meals  Exercise:   Analgesic Overuse:     Current Medication Regimen:  - none    Medications Tried: Response  Preventive:  - Botox: effective    Abortive:  -     Medications Not Tried:  -     Seizures:  Roxanna has a history of seizures.  These are characterized by auditory hallucinations and aphasia.  Each episode lasts ~60 seconds at a time.  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  She saw a psychiatrist who diagnosed her with seizures.  EEG was reportedly normal but didn't capture a typical event.   She started lacosamide and hasn't had any events since.  She is prescribed lacosamide 200 mg BID, but she usually only takes this once daily.    Family History:  Roxanna has a strong family history (father, paternal aunts, paternal grandmother, cousins [on father's side], brother, and niece [brother's daughter]) all suffer from a syndrome characterized by hyper-reflexia, clonus, gait abnormalities, and speech difficulties which arose in their early 20s and progressed to the extent that they required a wheel chair.  Workup for hereditary spastic paraparesis (genetic testing) was reportedly unremarkable.  Roxanna wonders if she might be a carrier for this condition (she has brisk reflexes), and she worries that she might pass this down to her son.    Roxanna's baby is due on 10/3/2021.      MEDICAL AND SURGICAL HISTORY:  Past Medical History:   Diagnosis Date   • Anesthesia 02/10/2021    mother hx apnea   • Anxiety    • Depression    • Migraine    • Seizure disorder, complex partial (HCC) 02/10/2021    hx of (last 1/2021)   • TMJ arthropathy      Past Surgical History:   Procedure Laterality Date   • PB LAMINOTOMY,LUMBAR DISK,1 INTRSP Left 2/24/2021    Procedure: LAMINECTOMY, SPINE, LUMBAR, WITH [id number];  Surgeon: Indra Goode M.D.;  Location: SURGERY [facility];  Service: Neurosurgery   • LUMBAR TRANSFORAMINAL EPIDURAL STEROID INJECTION Bilateral 2/1/2021    Procedure: INJECTION, STEROID, SPINE, LUMBAR, EPIDURAL, TRANSFORAMINAL APPROACH;  Surgeon: Aldair Segovia M.D.;  Location: [facility];  Service: Pain Management   • BLEPHAROPLASTY Bilateral 2019   • LEEP  2003   • HERNIA REPAIR     • LIPOMA EXCISION       MEDICATIONS:  Current Outpatient Medications   Medication Sig   • aspirin (ASPIRIN 81) 81 MG EC tablet Take  by mouth every day.   • acetaminophen (TYLENOL) 325 MG Tab Take 2 Tablets by mouth every 6 hours as needed (Mild Pain; (Pain scale 1-3); Temp greater than 100.5 F).   •  cyclobenzaprine (FLEXERIL) 5 mg tablet Take 5-10 mg by mouth 3 times a day as needed for Muscle Spasms.   • prenatal plus vitamin (STUARTNATAL 1+1) 27-1 MG Tab tablet Take 1 tablet by mouth.   • lacosamide (VIMPAT) 200 MG Tab tablet Take 200 mg by mouth 2 Times a Day.   • rizatriptan (MAXALT-MLT) 10 MG disintegrating tablet Take 1/2-1 tablet po at onset of headache, may repeat dose in 2 hours if unrelieved.  Do not exceed more than 2 tablets in 24 hours. (Patient taking differently: Take 5-10 mg by mouth one time as needed for Migraine.)   • fluticasone (FLONASE) 50 MCG/ACT nasal spray Spray 1 Spray in nose every day. (Patient not taking: Reported on 2/22/2021)     SOCIAL HISTORY:  Social History     Tobacco Use   • Smoking status: Never Smoker   • Smokeless tobacco: Never Used   Substance Use Topics   • Alcohol use: Not Currently     Social History     Social History Narrative   • Not on file     FAMILY HISTORY:  Family History   Problem Relation Age of Onset   • Hypertension Mother    • Cancer Father         prostate cancer   • Depression Father    • Genetic Disorder Father         Unknown Neuromusk disease   • Hypertension Maternal Grandmother    • Hyperlipidemia Maternal Grandmother    • Genetic Disorder Brother         Unknown Neuromusk disease   • Genetic Disorder Paternal Aunt         Unknown Neuromusk disease   • Genetic Disorder Paternal Grandmother         Unknown Neuromusk disease     REVIEW OF SYSTEMS:  A ROS was completed.  Pertinent positives and negatives were included in the HPI, above.  All other systems were reviewed and are negative.    PHYSICAL EXAM:  General/Medical:  - NAD  - hair, skin, nails, and joints were normal    Neuro:  MENTAL STATUS: awake and alert; no deficits of speech or language; oriented to person, place, and time; affect was appropriate to situation; pleasant, cooperative    CRANIAL NERVES:    II: acuity was: J1+/J2; fields intact to confrontation; pupils 3/3 to 2/2 without a  relative afferent pupillary defect; discs sharp    III/IV/VI: versions intact without nystagmus    V: facial sensation symmetric to light touch    VII: facial expression symmetric    VIII: hearing intact to finger rub    IX/X: palate elevates symmetrically    XI: shoulder shrug symmetric    XII: tongue midline    MOTOR:  - bulk was normal  - tone was normal in the upper extremities, increased in the lower extremities  Upper Extremity Strength  (R/L)    5/5   Elbow flexion 5/5   Elbow extension 5/5   Shoulder abduction 5/5     Lower Extremity Strength  (R/L)   Hip flexion 5/5   Knee extension 5/5   Knee flexion 5/5   Ankle plantarflexion 5/5   Ankle dorsiflexion 5/5     - can walk on toes and heels  - no pronator drift; no abnormal movements    SENSATION:  - light touch: intact over the upper and lower extremities (dysesthesias in the left leg)  - vibration (R/L, seconds): NT at the great toes  - pinprick: NT  - proprioception: NT  - Romberg: absent    COORDINATION:  - finger to nose was normal, no ataxia on exam  - finger tapping was rapid and accurate, bilaterally    REFLEXES:  Reflex Right Left   BR 2+ 2+   Biceps 3+ 3+   Triceps 2+ 2+   Patellae 3+ 3+   Achilles 2+ 2+   Toes down down   - Ho's sign was present, bilaterally    GAIT:  - narrow base and normal  - heel-raised and toe-raised gait: intact  - tandem gait: intact    REVIEW OF IMAGING STUDIES:  No brain imaging available for review.    REVIEW OF LABORATORY STUDIES:  No recent data available for review.    ASSESSMENT:  Roxanna Guzmán is a 35 y.o. woman with chronic migraine, seizure disorder, anxiety, and insomnia.  Plan to hold preventive (Botox) and abortive (rizatriptan) medications for migraine until after the delivery of her child (expected 10/3/2021).  Will continue lacosamide of 200 mg daily.    PLAN:  Chronic Migraine:  - hold Botox and rizatriptan for now  - resume these after delivery (mid-October)    Focal Aware Seizures:  -  "continue lacosamide at 200 mg daily for now    Follow-Up:  - No follow-ups on file.    Signed: Alvaro Leroy M.D. at 4:02 PM on 06/01/21    BILLING DOCUMENTATION:   I spent 53 minutes reviewing the medical record, interviewing and examining the patient, discussing my impression (see \"assessment\" above), and coordinating care.  "

## 2021-07-12 ENCOUNTER — HOSPITAL ENCOUNTER (OUTPATIENT)
Facility: MEDICAL CENTER | Age: 36
End: 2021-07-12
Attending: CLINICAL NURSE SPECIALIST
Payer: COMMERCIAL

## 2021-07-12 LAB
BASOPHILS # BLD AUTO: 0.6 % (ref 0–1.8)
BASOPHILS # BLD: 0.06 K/UL (ref 0–0.12)
EOSINOPHIL # BLD AUTO: 0.27 K/UL (ref 0–0.51)
EOSINOPHIL NFR BLD: 2.7 % (ref 0–6.9)
ERYTHROCYTE [DISTWIDTH] IN BLOOD BY AUTOMATED COUNT: 47.7 FL (ref 35.9–50)
GLUCOSE 1H P 50 G GLC PO SERPL-MCNC: 104 MG/DL (ref 70–139)
HCT VFR BLD AUTO: 38.8 % (ref 37–47)
HGB BLD-MCNC: 12.8 G/DL (ref 12–16)
IMM GRANULOCYTES # BLD AUTO: 0.06 K/UL (ref 0–0.11)
IMM GRANULOCYTES NFR BLD AUTO: 0.6 % (ref 0–0.9)
LYMPHOCYTES # BLD AUTO: 1.69 K/UL (ref 1–4.8)
LYMPHOCYTES NFR BLD: 17.1 % (ref 22–41)
MCH RBC QN AUTO: 30.4 PG (ref 27–33)
MCHC RBC AUTO-ENTMCNC: 33 G/DL (ref 33.6–35)
MCV RBC AUTO: 92.2 FL (ref 81.4–97.8)
MONOCYTES # BLD AUTO: 0.39 K/UL (ref 0–0.85)
MONOCYTES NFR BLD AUTO: 3.9 % (ref 0–13.4)
NEUTROPHILS # BLD AUTO: 7.44 K/UL (ref 2–7.15)
NEUTROPHILS NFR BLD: 75.1 % (ref 44–72)
NRBC # BLD AUTO: 0 K/UL
NRBC BLD-RTO: 0 /100 WBC
PLATELET # BLD AUTO: 249 K/UL (ref 164–446)
PMV BLD AUTO: 10.4 FL (ref 9–12.9)
RBC # BLD AUTO: 4.21 M/UL (ref 4.2–5.4)
TREPONEMA PALLIDUM IGG+IGM AB [PRESENCE] IN SERUM OR PLASMA BY IMMUNOASSAY: NORMAL
WBC # BLD AUTO: 9.9 K/UL (ref 4.8–10.8)

## 2021-07-12 PROCEDURE — 82950 GLUCOSE TEST: CPT

## 2021-07-12 PROCEDURE — 86780 TREPONEMA PALLIDUM: CPT

## 2021-07-12 PROCEDURE — 85025 COMPLETE CBC W/AUTO DIFF WBC: CPT

## 2021-08-09 ENCOUNTER — OFFICE VISIT (OUTPATIENT)
Dept: OBGYN | Facility: CLINIC | Age: 36
End: 2021-08-09
Payer: COMMERCIAL

## 2021-08-09 DIAGNOSIS — O92.70 LACTATION PROBLEM: ICD-10-CM

## 2021-08-09 PROCEDURE — 99205 OFFICE O/P NEW HI 60 MIN: CPT | Performed by: NURSE PRACTITIONER

## 2021-08-09 NOTE — PROGRESS NOTES
Summary Parents inquiring abut the reality of breastfeeding given her personal circumstances. Discussed various options. Parents open to watching it unfold but know they will be supported for their decision. Encouraged their confidence even as it is emerging.      Subjective:     Roxanna Guzmán is a 35 y.o. female here to establish lactation care. She is here today with  (Dru).    Concerns:   Prenatal consultation     HPI:   Pertinent  history: Due 10/3/21  Mother does not have a history of GDM, hypertension prior to pregnancy, insulin resistance, multiple gestation, PCOS and thyroid disease. These are common conditions which may interfere with milk supply.    Mother does have advanced maternal age. This is a condition these are conditions that may interfere in milk supply.  Other risk factors Hx of anxiety    FEEDING HISTORY:    Past breastfeeding history:  First baby     Breast Pumping:   Type of pump:  None now    ROS:  Constitutional: . Feeling well  Breasts: No soreness of breasts and No soreness of nipples  Psychiatric: Managing ok  Mental Health: No mention of feeling irritable, agitated, angry, overwhelmed, apathy, exhaustion nor having sleep changes outside infant feeds/demands or appetite changes     Objective:     General: no acute distress  Neurological:  Alert and oriented x3  Psychiatric:  Normal mood and affect. Her behavior is normal. Judgment and thought content normal   Mental Health:  Did NOT exhibit sadness, crying, feeling overwhelmed, agitation or hypervigilance.     Assessment/Plan & Lactation Counseling:     Diagnosis/Problem  Lactation Problem: Prenatal consultation      Discussed concerns and symptoms as listed above in assessment and guidance summarized below.  Topics reviewed included:  •  Herbs and medications for increasing supply and their potential side effects and efficacy. No evidence base exists to support their use  •  Maternal Mental Health:  Confidence and self efficacy are pillars to support families through post partum experiences. You are getting your support system together along with resources.   •  Milk supply is dependent on glandular tissue development, hormonal influences, how many times the baby removes milk and how well the breasts are emptied in a 24 hour period. This is a biological reality that we can NOT work around. If, for any reason, your baby is not latching, or you are not able to nurse, then it is important for you to remove the milk instead by pumping or hand expression.  There's no magic trick, tea, food, drink, cookie or supplement that will increase your milk supply. One  must  effectively remove milk to continue to make and maximize milk. In the early days and weeks that can be 8+ times in 24 hours. For older babies, on average 6-7 + times in 24 hours.    •  Feeding guidelines, plan on:   o Feed your baby every 1.5-3 hours, more often if baby acts hungry.   o Awaken baby for feeding if going over 3 hours in the day.   o Until back to birth weight, ONE four hour at night is acceptable if has had 8 prior feedings in 24 hours.    o Need to get in 8-12 feedings per 24 hours.     •  Pumping guidelines: Not preventatively, plan will be provided if necessary. Things to consider include:   - Both breasts   - Pumping is effective but can quickly exhaust and overwhelm a new mother  o How long will vary woman to woman, typically 8-15 minutes, or 1-2 minutes after flow slows  o Flange Fit: Freely moving nipple in the tunnel with some movement of the areola.  o Be gentle with your breast pump review the settings with a lactation consultant so the pump does not hurt you  • Pain Persistent pain means something is not right, even if latch looks good, if it hurts it needs investigation. Tenderness may occur but not pain.    A total of 67 minutes,  with more than 50% was spent preparing to see the patient, obtaining and reviewing separately  obtained history, performing a medically appropriate examination and evaluation, counseling and educating the family, documenting clinical information in the electronic health record,  communicating these results to the family and care coordination as detailed in the above note.         PLEASE NOTE: Some of this note was created using voice recognition software. I have made every reasonable attempt to correct obvious errors, but I expect that there may be errors of grammar and possibly content that I did not discover prior finalizing this note.  FRED Hernandez.

## 2021-10-06 ENCOUNTER — APPOINTMENT (OUTPATIENT)
Dept: OBGYN | Facility: MEDICAL CENTER | Age: 36
End: 2021-10-06
Attending: OBSTETRICS & GYNECOLOGY
Payer: COMMERCIAL

## 2021-10-06 PROCEDURE — U0005 INFEC AGEN DETEC AMPLI PROBE: HCPCS

## 2021-10-06 PROCEDURE — U0003 INFECTIOUS AGENT DETECTION BY NUCLEIC ACID (DNA OR RNA); SEVERE ACUTE RESPIRATORY SYNDROME CORONAVIRUS 2 (SARS-COV-2) (CORONAVIRUS DISEASE [COVID-19]), AMPLIFIED PROBE TECHNIQUE, MAKING USE OF HIGH THROUGHPUT TECHNOLOGIES AS DESCRIBED BY CMS-2020-01-R: HCPCS

## 2021-10-06 NOTE — PROGRESS NOTES
Pt presents to L&D for preadmission COVID swab. All questions answered. PT discharged home in stable condition.

## 2021-10-07 ENCOUNTER — HOSPITAL ENCOUNTER (INPATIENT)
Facility: MEDICAL CENTER | Age: 36
LOS: 2 days | End: 2021-10-09
Attending: OBSTETRICS & GYNECOLOGY | Admitting: OBSTETRICS & GYNECOLOGY
Payer: COMMERCIAL

## 2021-10-07 DIAGNOSIS — D62 ACUTE BLOOD LOSS AS CAUSE OF POSTOPERATIVE ANEMIA: ICD-10-CM

## 2021-10-07 PROBLEM — R82.71 BACTERIURIA WITH PYURIA: Status: RESOLVED | Noted: 2021-02-22 | Resolved: 2021-10-07

## 2021-10-07 PROBLEM — Z3A.08 8 WEEKS GESTATION OF PREGNANCY: Status: RESOLVED | Noted: 2021-02-22 | Resolved: 2021-10-07

## 2021-10-07 PROBLEM — R82.81 BACTERIURIA WITH PYURIA: Status: RESOLVED | Noted: 2021-02-22 | Resolved: 2021-10-07

## 2021-10-07 LAB
ALBUMIN SERPL BCP-MCNC: 3.8 G/DL (ref 3.2–4.9)
ALBUMIN/GLOB SERPL: 1.2 G/DL
ALP SERPL-CCNC: 177 U/L (ref 30–99)
ALT SERPL-CCNC: 19 U/L (ref 2–50)
ANION GAP SERPL CALC-SCNC: 12 MMOL/L (ref 7–16)
AST SERPL-CCNC: 27 U/L (ref 12–45)
BASOPHILS # BLD AUTO: 0.5 % (ref 0–1.8)
BASOPHILS # BLD: 0.08 K/UL (ref 0–0.12)
BILIRUB SERPL-MCNC: 0.3 MG/DL (ref 0.1–1.5)
BUN SERPL-MCNC: 10 MG/DL (ref 8–22)
CALCIUM SERPL-MCNC: 9.5 MG/DL (ref 8.5–10.5)
CHLORIDE SERPL-SCNC: 102 MMOL/L (ref 96–112)
CO2 SERPL-SCNC: 20 MMOL/L (ref 20–33)
CREAT SERPL-MCNC: 0.53 MG/DL (ref 0.5–1.4)
CREAT UR-MCNC: 48.72 MG/DL
EOSINOPHIL # BLD AUTO: 0.09 K/UL (ref 0–0.51)
EOSINOPHIL NFR BLD: 0.6 % (ref 0–6.9)
ERYTHROCYTE [DISTWIDTH] IN BLOOD BY AUTOMATED COUNT: 43.7 FL (ref 35.9–50)
GLOBULIN SER CALC-MCNC: 3.2 G/DL (ref 1.9–3.5)
GLUCOSE SERPL-MCNC: 106 MG/DL (ref 65–99)
HCT VFR BLD AUTO: 41.1 % (ref 37–47)
HGB BLD-MCNC: 14.5 G/DL (ref 12–16)
HOLDING TUBE BB 8507: NORMAL
IMM GRANULOCYTES # BLD AUTO: 0.08 K/UL (ref 0–0.11)
IMM GRANULOCYTES NFR BLD AUTO: 0.5 % (ref 0–0.9)
LYMPHOCYTES # BLD AUTO: 1.87 K/UL (ref 1–4.8)
LYMPHOCYTES NFR BLD: 12 % (ref 22–41)
MCH RBC QN AUTO: 30.8 PG (ref 27–33)
MCHC RBC AUTO-ENTMCNC: 35.3 G/DL (ref 33.6–35)
MCV RBC AUTO: 87.3 FL (ref 81.4–97.8)
MONOCYTES # BLD AUTO: 0.6 K/UL (ref 0–0.85)
MONOCYTES NFR BLD AUTO: 3.9 % (ref 0–13.4)
NEUTROPHILS # BLD AUTO: 12.81 K/UL (ref 2–7.15)
NEUTROPHILS NFR BLD: 82.5 % (ref 44–72)
NRBC # BLD AUTO: 0 K/UL
NRBC BLD-RTO: 0 /100 WBC
PLATELET # BLD AUTO: 271 K/UL (ref 164–446)
PMV BLD AUTO: 10.6 FL (ref 9–12.9)
POTASSIUM SERPL-SCNC: 4.1 MMOL/L (ref 3.6–5.5)
PROT SERPL-MCNC: 7 G/DL (ref 6–8.2)
PROT UR-MCNC: 15 MG/DL (ref 0–15)
PROT/CREAT UR: 308 MG/G (ref 10–107)
RBC # BLD AUTO: 4.71 M/UL (ref 4.2–5.4)
SARS-COV+SARS-COV-2 AG RESP QL IA.RAPID: NOTDETECTED
SODIUM SERPL-SCNC: 134 MMOL/L (ref 135–145)
SPECIMEN SOURCE: NORMAL
URATE SERPL-MCNC: 4.2 MG/DL (ref 1.9–8.2)
WBC # BLD AUTO: 15.5 K/UL (ref 4.8–10.8)

## 2021-10-07 PROCEDURE — 84550 ASSAY OF BLOOD/URIC ACID: CPT

## 2021-10-07 PROCEDURE — 85025 COMPLETE CBC W/AUTO DIFF WBC: CPT

## 2021-10-07 PROCEDURE — 59409 OBSTETRICAL CARE: CPT

## 2021-10-07 PROCEDURE — 302449 STATCHG TRIAGE ONLY (STATISTIC)

## 2021-10-07 PROCEDURE — A9270 NON-COVERED ITEM OR SERVICE: HCPCS | Performed by: OBSTETRICS & GYNECOLOGY

## 2021-10-07 PROCEDURE — 700111 HCHG RX REV CODE 636 W/ 250 OVERRIDE (IP): Performed by: OBSTETRICS & GYNECOLOGY

## 2021-10-07 PROCEDURE — 0UQMXZZ REPAIR VULVA, EXTERNAL APPROACH: ICD-10-PCS | Performed by: OBSTETRICS & GYNECOLOGY

## 2021-10-07 PROCEDURE — A9270 NON-COVERED ITEM OR SERVICE: HCPCS

## 2021-10-07 PROCEDURE — 700105 HCHG RX REV CODE 258: Performed by: OBSTETRICS & GYNECOLOGY

## 2021-10-07 PROCEDURE — 304965 HCHG RECOVERY SERVICES

## 2021-10-07 PROCEDURE — 36415 COLL VENOUS BLD VENIPUNCTURE: CPT

## 2021-10-07 PROCEDURE — 700102 HCHG RX REV CODE 250 W/ 637 OVERRIDE(OP): Performed by: OBSTETRICS & GYNECOLOGY

## 2021-10-07 PROCEDURE — 80053 COMPREHEN METABOLIC PANEL: CPT

## 2021-10-07 PROCEDURE — 82570 ASSAY OF URINE CREATININE: CPT

## 2021-10-07 PROCEDURE — 700101 HCHG RX REV CODE 250

## 2021-10-07 PROCEDURE — 84156 ASSAY OF PROTEIN URINE: CPT

## 2021-10-07 PROCEDURE — 10907ZC DRAINAGE OF AMNIOTIC FLUID, THERAPEUTIC FROM PRODUCTS OF CONCEPTION, VIA NATURAL OR ARTIFICIAL OPENING: ICD-10-PCS | Performed by: OBSTETRICS & GYNECOLOGY

## 2021-10-07 PROCEDURE — 0KQM0ZZ REPAIR PERINEUM MUSCLE, OPEN APPROACH: ICD-10-PCS | Performed by: OBSTETRICS & GYNECOLOGY

## 2021-10-07 PROCEDURE — 700102 HCHG RX REV CODE 250 W/ 637 OVERRIDE(OP)

## 2021-10-07 PROCEDURE — 770002 HCHG ROOM/CARE - OB PRIVATE (112)

## 2021-10-07 PROCEDURE — 87426 SARSCOV CORONAVIRUS AG IA: CPT

## 2021-10-07 RX ORDER — METHYLERGONOVINE MALEATE 0.2 MG/ML
0.2 INJECTION INTRAVENOUS
Status: DISCONTINUED | OUTPATIENT
Start: 2021-10-07 | End: 2021-10-09 | Stop reason: HOSPADM

## 2021-10-07 RX ORDER — SODIUM CHLORIDE, SODIUM LACTATE, POTASSIUM CHLORIDE, CALCIUM CHLORIDE 600; 310; 30; 20 MG/100ML; MG/100ML; MG/100ML; MG/100ML
INJECTION, SOLUTION INTRAVENOUS PRN
Status: DISCONTINUED | OUTPATIENT
Start: 2021-10-07 | End: 2021-10-09 | Stop reason: HOSPADM

## 2021-10-07 RX ORDER — IBUPROFEN 600 MG/1
600 TABLET ORAL EVERY 6 HOURS PRN
Status: DISCONTINUED | OUTPATIENT
Start: 2021-10-07 | End: 2021-10-07

## 2021-10-07 RX ORDER — OXYCODONE HYDROCHLORIDE AND ACETAMINOPHEN 5; 325 MG/1; MG/1
1 TABLET ORAL EVERY 4 HOURS PRN
Status: DISCONTINUED | OUTPATIENT
Start: 2021-10-07 | End: 2021-10-09 | Stop reason: HOSPADM

## 2021-10-07 RX ORDER — ONDANSETRON 4 MG/1
4 TABLET, ORALLY DISINTEGRATING ORAL EVERY 6 HOURS PRN
Status: DISCONTINUED | OUTPATIENT
Start: 2021-10-07 | End: 2021-10-09 | Stop reason: HOSPADM

## 2021-10-07 RX ORDER — OMEPRAZOLE 20 MG/1
20 CAPSULE, DELAYED RELEASE ORAL DAILY
COMMUNITY
End: 2021-11-29

## 2021-10-07 RX ORDER — MISOPROSTOL 200 UG/1
800 TABLET ORAL ONCE
Status: COMPLETED | OUTPATIENT
Start: 2021-10-07 | End: 2021-10-07

## 2021-10-07 RX ORDER — MISOPROSTOL 200 UG/1
600 TABLET ORAL
Status: DISCONTINUED | OUTPATIENT
Start: 2021-10-07 | End: 2021-10-09 | Stop reason: HOSPADM

## 2021-10-07 RX ORDER — ONDANSETRON 2 MG/ML
4 INJECTION INTRAMUSCULAR; INTRAVENOUS EVERY 6 HOURS PRN
Status: DISCONTINUED | OUTPATIENT
Start: 2021-10-07 | End: 2021-10-09 | Stop reason: HOSPADM

## 2021-10-07 RX ORDER — CARBOPROST TROMETHAMINE 250 UG/ML
250 INJECTION, SOLUTION INTRAMUSCULAR
Status: DISCONTINUED | OUTPATIENT
Start: 2021-10-07 | End: 2021-10-09 | Stop reason: HOSPADM

## 2021-10-07 RX ORDER — BISACODYL 10 MG
10 SUPPOSITORY, RECTAL RECTAL PRN
Status: DISCONTINUED | OUTPATIENT
Start: 2021-10-07 | End: 2021-10-09 | Stop reason: HOSPADM

## 2021-10-07 RX ORDER — LACOSAMIDE 50 MG/1
200 TABLET ORAL
COMMUNITY
End: 2021-11-29

## 2021-10-07 RX ORDER — DOCUSATE SODIUM 100 MG/1
100 CAPSULE, LIQUID FILLED ORAL 2 TIMES DAILY PRN
Status: DISCONTINUED | OUTPATIENT
Start: 2021-10-07 | End: 2021-10-09 | Stop reason: HOSPADM

## 2021-10-07 RX ORDER — LIDOCAINE HYDROCHLORIDE 10 MG/ML
INJECTION, SOLUTION INFILTRATION; PERINEURAL
Status: COMPLETED
Start: 2021-10-07 | End: 2021-10-07

## 2021-10-07 RX ORDER — VITAMIN A ACETATE, BETA CAROTENE, ASCORBIC ACID, CHOLECALCIFEROL, .ALPHA.-TOCOPHEROL ACETATE, DL-, THIAMINE MONONITRATE, RIBOFLAVIN, NIACINAMIDE, PYRIDOXINE HYDROCHLORIDE, FOLIC ACID, CYANOCOBALAMIN, CALCIUM CARBONATE, FERROUS FUMARATE, ZINC OXIDE, CUPRIC OXIDE 3080; 12; 120; 400; 1; 1.84; 3; 20; 22; 920; 25; 200; 27; 10; 2 [IU]/1; UG/1; MG/1; [IU]/1; MG/1; MG/1; MG/1; MG/1; MG/1; [IU]/1; MG/1; MG/1; MG/1; MG/1; MG/1
1 TABLET, FILM COATED ORAL EVERY MORNING
Status: DISCONTINUED | OUTPATIENT
Start: 2021-10-08 | End: 2021-10-09 | Stop reason: HOSPADM

## 2021-10-07 RX ORDER — ACETAMINOPHEN 325 MG/1
650 TABLET ORAL ONCE
Status: COMPLETED | OUTPATIENT
Start: 2021-10-07 | End: 2021-10-07

## 2021-10-07 RX ORDER — SODIUM CHLORIDE, SODIUM LACTATE, POTASSIUM CHLORIDE, CALCIUM CHLORIDE 600; 310; 30; 20 MG/100ML; MG/100ML; MG/100ML; MG/100ML
INJECTION, SOLUTION INTRAVENOUS CONTINUOUS
Status: DISCONTINUED | OUTPATIENT
Start: 2021-10-07 | End: 2021-10-09 | Stop reason: HOSPADM

## 2021-10-07 RX ORDER — ACETAMINOPHEN 325 MG/1
325 TABLET ORAL EVERY 4 HOURS PRN
Status: DISCONTINUED | OUTPATIENT
Start: 2021-10-07 | End: 2021-10-09 | Stop reason: HOSPADM

## 2021-10-07 RX ORDER — OXYCODONE HYDROCHLORIDE AND ACETAMINOPHEN 5; 325 MG/1; MG/1
1 TABLET ORAL EVERY 6 HOURS PRN
Status: DISCONTINUED | OUTPATIENT
Start: 2021-10-07 | End: 2021-10-09 | Stop reason: HOSPADM

## 2021-10-07 RX ORDER — LACOSAMIDE 100 MG/1
200 TABLET ORAL NIGHTLY
Status: DISCONTINUED | OUTPATIENT
Start: 2021-10-07 | End: 2021-10-09 | Stop reason: HOSPADM

## 2021-10-07 RX ORDER — IBUPROFEN 600 MG/1
600 TABLET ORAL EVERY 6 HOURS PRN
Status: DISCONTINUED | OUTPATIENT
Start: 2021-10-07 | End: 2021-10-09 | Stop reason: HOSPADM

## 2021-10-07 RX ORDER — OXYCODONE AND ACETAMINOPHEN 10; 325 MG/1; MG/1
1 TABLET ORAL EVERY 4 HOURS PRN
Status: DISCONTINUED | OUTPATIENT
Start: 2021-10-07 | End: 2021-10-07

## 2021-10-07 RX ORDER — MISOPROSTOL 200 UG/1
TABLET ORAL
Status: COMPLETED
Start: 2021-10-07 | End: 2021-10-07

## 2021-10-07 RX ADMIN — IBUPROFEN 600 MG: 600 TABLET ORAL at 20:21

## 2021-10-07 RX ADMIN — LACOSAMIDE 200 MG: 100 TABLET, FILM COATED ORAL at 23:04

## 2021-10-07 RX ADMIN — SODIUM CHLORIDE, POTASSIUM CHLORIDE, SODIUM LACTATE AND CALCIUM CHLORIDE: 600; 310; 30; 20 INJECTION, SOLUTION INTRAVENOUS at 08:45

## 2021-10-07 RX ADMIN — PENICILLIN G POTASSIUM 2.5 MILLION UNITS: 20000000 INJECTION, POWDER, FOR SOLUTION INTRAVENOUS at 12:35

## 2021-10-07 RX ADMIN — MISOPROSTOL 800 MCG: 200 TABLET ORAL at 18:30

## 2021-10-07 RX ADMIN — Medication 20 ML: at 18:10

## 2021-10-07 RX ADMIN — SODIUM CHLORIDE 5 MILLION UNITS: 900 INJECTION INTRAVENOUS at 08:37

## 2021-10-07 RX ADMIN — PENICILLIN G POTASSIUM 2.5 MILLION UNITS: 20000000 INJECTION, POWDER, FOR SOLUTION INTRAVENOUS at 16:26

## 2021-10-07 RX ADMIN — OXYTOCIN 2000 ML/HR: 10 INJECTION, SOLUTION INTRAMUSCULAR; INTRAVENOUS at 18:10

## 2021-10-07 RX ADMIN — ACETAMINOPHEN 650 MG: 325 TABLET ORAL at 09:55

## 2021-10-07 RX ADMIN — ACETAMINOPHEN 325 MG: 325 TABLET, FILM COATED ORAL at 22:47

## 2021-10-07 RX ADMIN — LIDOCAINE HYDROCHLORIDE 20 ML: 10 INJECTION, SOLUTION INFILTRATION; PERINEURAL at 18:10

## 2021-10-07 ASSESSMENT — PATIENT HEALTH QUESTIONNAIRE - PHQ9
2. FEELING DOWN, DEPRESSED, IRRITABLE, OR HOPELESS: NOT AT ALL
2. FEELING DOWN, DEPRESSED, IRRITABLE, OR HOPELESS: NOT AT ALL
1. LITTLE INTEREST OR PLEASURE IN DOING THINGS: NOT AT ALL
1. LITTLE INTEREST OR PLEASURE IN DOING THINGS: NOT AT ALL
SUM OF ALL RESPONSES TO PHQ9 QUESTIONS 1 AND 2: 0
SUM OF ALL RESPONSES TO PHQ9 QUESTIONS 1 AND 2: 0

## 2021-10-07 ASSESSMENT — EDINBURGH POSTNATAL DEPRESSION SCALE (EPDS)
I HAVE BEEN SO UNHAPPY THAT I HAVE HAD DIFFICULTY SLEEPING: NOT AT ALL
I HAVE BLAMED MYSELF UNNECESSARILY WHEN THINGS WENT WRONG: NO, NEVER
I HAVE FELT SCARED OR PANICKY FOR NO GOOD REASON: NO, NOT MUCH
I HAVE BEEN SO UNHAPPY THAT I HAVE BEEN CRYING: NO, NEVER
THINGS HAVE BEEN GETTING ON TOP OF ME: NO, MOST OF THE TIME I HAVE COPED QUITE WELL
I HAVE BEEN ABLE TO LAUGH AND SEE THE FUNNY SIDE OF THINGS: AS MUCH AS I ALWAYS COULD
THE THOUGHT OF HARMING MYSELF HAS OCCURRED TO ME: NEVER
I HAVE LOOKED FORWARD WITH ENJOYMENT TO THINGS: AS MUCH AS I EVER DID
I HAVE BEEN ANXIOUS OR WORRIED FOR NO GOOD REASON: HARDLY EVER
I HAVE FELT SAD OR MISERABLE: NO, NOT AT ALL

## 2021-10-07 ASSESSMENT — FIBROSIS 4 INDEX: FIB4 SCORE: 0.61

## 2021-10-07 ASSESSMENT — COPD QUESTIONNAIRES
DURING THE PAST 4 WEEKS HOW MUCH DID YOU FEEL SHORT OF BREATH: NONE/LITTLE OF THE TIME
COPD SCREENING SCORE: 0
DO YOU EVER COUGH UP ANY MUCUS OR PHLEGM?: NO/ONLY WITH OCCASIONAL COLDS OR INFECTIONS
IN THE PAST 12 MONTHS DO YOU DO LESS THAN YOU USED TO BECAUSE OF YOUR BREATHING PROBLEMS: DISAGREE/UNSURE
HAVE YOU SMOKED AT LEAST 100 CIGARETTES IN YOUR ENTIRE LIFE: NO/DON'T KNOW

## 2021-10-07 ASSESSMENT — LIFESTYLE VARIABLES
EVER HAD A DRINK FIRST THING IN THE MORNING TO STEADY YOUR NERVES TO GET RID OF A HANGOVER: NO
ALCOHOL_USE: NO
TOTAL SCORE: 0
HAVE YOU EVER FELT YOU SHOULD CUT DOWN ON YOUR DRINKING: NO
EVER_SMOKED: NEVER
EVER FELT BAD OR GUILTY ABOUT YOUR DRINKING: NO
TOTAL SCORE: 0
TOTAL SCORE: 0
CONSUMPTION TOTAL: INCOMPLETE
HAVE PEOPLE ANNOYED YOU BY CRITICIZING YOUR DRINKING: NO
DOES PATIENT WANT TO STOP DRINKING: NO

## 2021-10-07 ASSESSMENT — PAIN DESCRIPTION - PAIN TYPE
TYPE: ACUTE PAIN

## 2021-10-07 NOTE — H&P
"Labor and Delivery History and Physical    CC: contractions    HPI: Roxanna Guzmán is a 35 yo  who presents at 40w4d with complaints of contractions. She denies bleeding or LOF. Positive FM. She received her prenatal care with Dr Mendoza this pregnancy. It was largely uncomplicated. She had an atomic scan with Dr Donaldson that demonstrated a VSD, requiring  echo. She had NIPT that demonstrated a chromosome structure of 46 XY.     All other systems were reviewed and were negative.    PMH:seizure d/o, migraines, depression  PSH:laminectomy, LEEP  OB HX:current  Gyn Hx:denies STI including HSV for herself or her spouse, denies abnormal pap smears  SH:denies T/E/D  Meds:PNV, vimpat  Allergies:NKDA    /92   Pulse (!) 106   Temp 37.3 °C (99.1 °F) (Temporal)   Resp 16   Ht 1.753 m (5' 9\")   Wt 82.6 kg (182 lb)   LMP 02/10/2021 (Exact Date)   BMI 26.88 kg/m²     Physical Exam  Gen: NAD  Abd: gravid, non tender  Ext: no edema    FHT:130's, moderate variability present, accelerations present, decelerations absent  [place]:2 min  SVE:5/90/-2    Laboratory Evaluation  ABO: A pos  GBS: pos  GTT:104  Rubella: Immune  HIV: Neg  RPR: NR  Hep sAg: neg      Assessment:   1. Term IUP at 40w4d  2. Labor  3. GBS positive  4. AMA  5. Seizure d/o  6.  VSD    Plan: Admit to L&D for management of labor. PCN for GBS prophylaxis. May have epidural on demand. Anticipate vaginal delivery.    Walker Mcnamara M.D.      "

## 2021-10-07 NOTE — PROGRESS NOTES
OB Progress Note    Ready for AROM. FHT with baseline of 120's. Moderate variability present. Accelerations present. Decelerations absent. [place] with contractions every 2-3 min. SVE 9/C/0. Anticipate vaginal delivery.

## 2021-10-07 NOTE — CARE PLAN
Problem: Knowledge Deficit - L&D  Goal: Patient and family/caregivers will demonstrate understanding of plan of care, disease process/condition, diagnostic tests and medications  10/7/2021 1012 by Ping Wilburn RPravinN.  Outcome: Progressing  10/7/2021 1012 by Ping Wilburn R.N.  Outcome: Progressing     Problem: Risk for Infection and Impaired Wound Healing  Goal: Patient will remain free from infection  Outcome: Progressing     Problem: Pain  Goal: Patient's pain will be alleviated or reduced to the patient’s comfort goal  Outcome: Progressing   The patient is Watcher - Medium risk of patient condition declining or worsening

## 2021-10-07 NOTE — PROGRESS NOTES
"0740- 35 yo  presents to L&D with c/o painful contractions that started about 0130 today. Pt reports +FM, denies any LOF or VB. BP is elevated at 162/82, pt denies any BP issues during pregnancy thus far. FOB \"Dru\" in room and providing great support. EFM and TOCO applied, pt oriented to room and monitors.   0800- SVE 5/90/+2 with BBOW. MFTI score unable to file due to system issue. MFTI score of 3.   0810- Dr Mcnamara on unit and updated on pt arrival and status. Pt is GBS +. Orders to admit pt, run pre-eclampsia labs, and start PCN (see MAR).   0845- Dr Mcnamara at bedside to talk with pt. Birth plan reviewed. Pt has cord blood and tissue collection kit.   1145- Pt would like to shower. Dr Mcnamara reviewed strip and recent BP's and she is ok with pt showering at this time. Pt can be off the monitors for estimate of 20 mins.   1328- Dr Mcnamara at bedside to assess pt. SVE 8-9/100/-1. Room ready for delivery.  1538- Dr Mcnamara at bedside to assess pt. SVE 9/100/0. AROM moderated amount of clear fluid. Pt coping well with contractions at this time, FOB providing great support.   1720- SVE complete/+1. Dr Mcnamara made aware and will be in shortly.   1724- Dr Mcnamara in room for delivery. FOB \"Dru\" would like to help delivery and Dr Mcnamara is ok with this. FOB put on sterile gown and gloves for delivery.  1803-  of viable male, apgars 8/9. Cord blood and tissue collection completed by Dr Mcnamara.  - Placenta delivered and Pitocin bolus started per order. Dr Mcnamara straight cathed pt due to bleeding.   0- 800 mcg Cytotec placed rectally by Dr Mcnamara.   - Report given to ALENA Nation.               "

## 2021-10-07 NOTE — PROGRESS NOTES
OB Progress Note    Breathing through her contractions. FHT with baseline of 120's. Moderate variability present. Accelerations present. Decelerations absent. [place] with contractions every 2-3 min. SVE 8-9/C/-1. Anticipate vaginal delivery.

## 2021-10-08 ENCOUNTER — PHARMACY VISIT (OUTPATIENT)
Dept: PHARMACY | Facility: MEDICAL CENTER | Age: 36
End: 2021-10-08
Payer: COMMERCIAL

## 2021-10-08 PROBLEM — D62 ACUTE BLOOD LOSS AS CAUSE OF POSTOPERATIVE ANEMIA: Status: ACTIVE | Noted: 2021-10-08

## 2021-10-08 PROBLEM — O14.93 PRE-ECLAMPSIA IN THIRD TRIMESTER: Status: ACTIVE | Noted: 2021-10-08

## 2021-10-08 LAB
ERYTHROCYTE [DISTWIDTH] IN BLOOD BY AUTOMATED COUNT: 45.1 FL (ref 35.9–50)
HCT VFR BLD AUTO: 24.7 % (ref 37–47)
HGB BLD-MCNC: 8.5 G/DL (ref 12–16)
MCH RBC QN AUTO: 30.8 PG (ref 27–33)
MCHC RBC AUTO-ENTMCNC: 34.4 G/DL (ref 33.6–35)
MCV RBC AUTO: 89.5 FL (ref 81.4–97.8)
PLATELET # BLD AUTO: 193 K/UL (ref 164–446)
PMV BLD AUTO: 10.3 FL (ref 9–12.9)
RBC # BLD AUTO: 2.76 M/UL (ref 4.2–5.4)
SARS-COV-2 RNA RESP QL NAA+PROBE: NOTDETECTED
SPECIMEN SOURCE: NORMAL
WBC # BLD AUTO: 18.3 K/UL (ref 4.8–10.8)

## 2021-10-08 PROCEDURE — 85027 COMPLETE CBC AUTOMATED: CPT

## 2021-10-08 PROCEDURE — RXMED WILLOW AMBULATORY MEDICATION CHARGE: Performed by: OBSTETRICS & GYNECOLOGY

## 2021-10-08 PROCEDURE — A9270 NON-COVERED ITEM OR SERVICE: HCPCS | Performed by: OBSTETRICS & GYNECOLOGY

## 2021-10-08 PROCEDURE — 36415 COLL VENOUS BLD VENIPUNCTURE: CPT

## 2021-10-08 PROCEDURE — 770002 HCHG ROOM/CARE - OB PRIVATE (112)

## 2021-10-08 PROCEDURE — 700102 HCHG RX REV CODE 250 W/ 637 OVERRIDE(OP): Performed by: OBSTETRICS & GYNECOLOGY

## 2021-10-08 RX ORDER — IBUPROFEN 600 MG/1
600 TABLET ORAL EVERY 6 HOURS PRN
Qty: 30 TABLET | Refills: 0 | Status: SHIPPED | OUTPATIENT
Start: 2021-10-08 | End: 2022-02-28

## 2021-10-08 RX ORDER — FERROUS SULFATE 325(65) MG
325 TABLET ORAL DAILY
Qty: 60 TABLET | Refills: 0 | Status: SHIPPED | OUTPATIENT
Start: 2021-10-08 | End: 2022-02-28

## 2021-10-08 RX ADMIN — IBUPROFEN 600 MG: 600 TABLET ORAL at 12:19

## 2021-10-08 RX ADMIN — LACOSAMIDE 200 MG: 100 TABLET, FILM COATED ORAL at 22:35

## 2021-10-08 RX ADMIN — IBUPROFEN 600 MG: 600 TABLET ORAL at 06:39

## 2021-10-08 RX ADMIN — IBUPROFEN 600 MG: 600 TABLET ORAL at 20:10

## 2021-10-08 RX ADMIN — PRENATAL WITH FERROUS FUM AND FOLIC ACID 1 TABLET: 3080; 920; 120; 400; 22; 1.84; 3; 20; 10; 1; 12; 200; 27; 25; 2 TABLET ORAL at 08:11

## 2021-10-08 ASSESSMENT — PAIN DESCRIPTION - PAIN TYPE
TYPE: ACUTE PAIN

## 2021-10-08 NOTE — DISCHARGE PLANNING
Attempted to meet with pt 3x today to complete PP assessment. Pt was resting or receiving care during attempts.     Per RN, pt is anticipated to d/c tomorrow. Will re attempt at another time.

## 2021-10-08 NOTE — CARE PLAN
Problem: Knowledge Deficit - L&D  Goal: Patient and family/caregivers will demonstrate understanding of plan of care, disease process/condition, diagnostic tests and medications  Outcome: Progressing  Note: Mother updated on plan of care for the shift. All questions answered at this time.      Problem: Pain - Standard  Goal: Alleviation of pain or a reduction in pain to the patient’s comfort goal  Outcome: Progressing  Note: Mother reports mild swelling and discomfort to bilateral hands. Medicated with motrin x1 as requested.    The patient is Stable - Low risk of patient condition declining or worsening    Shift Goals  Clinical Goals: remain clinically stable     Progress made toward(s) clinical / shift goals:  progressing    Patient is not progressing towards the following goals:

## 2021-10-08 NOTE — DISCHARGE PLANNING
Meds-to-Beds: Discharge prescription orders listed below delivered to patient's bedside. RN notified. Patient counseled. Patient elected to have co-payment billed to patient account.      Current Outpatient Medications   Medication Sig Dispense Refill   • ibuprofen (MOTRIN) 600 MG Tab Take 1 Tablet by mouth every 6 hours as needed. 30 Tablet 0   • ferrous sulfate 325 (65 Fe) MG tablet Take 1 Tablet by mouth every day. 60 Tablet 0      Kady Willis, PharmD

## 2021-10-08 NOTE — PROGRESS NOTES
6785-Patient transferred from labor and delivery. Two RN verification of infant and parent armbands. Report received from L&D RN. Patient oriented to unit, call light, emergency light, and infant security. Assessment completed, fundus firm @ u, lochia light. Patient declines intervention for pain at this time. Pitocin infusing at 125 ml/hr. Patient encouraged to call with needs.      5531-Dr. Mcnamara notified of patients hemoglobin drop, rounding on patient at this time.

## 2021-10-08 NOTE — L&D DELIVERY NOTE
"Delivery Summary    Roxanna Guzmán is a 37 yo  who presented at 40w4d in labor. She progressed without augmentation or pain intervention to complete dilation where with pushing efforts she underwent a spontaneous vaginal delivery of a viable male infant \"Aniceto\" in CHARAN position with APGARS 8/9. The head delivered atraumatically. The anterior shoulder delivered with gentle downward pressure and the remainder of the body followed. The infant was placed on the maternal chest. The cord was clamped and cut after a minute delay. The placenta was delivered with gentle pressure. The uterus firmed with fundal pressure and pitocin. The perineum was examined and a left labial and second degree laceration were repaired with 3.0 chromic in the usual fashion.    EBL: 650cc  Anesthesia: none  Complications: None    Walker Mcnamara M.D.      "

## 2021-10-08 NOTE — DISCHARGE SUMMARY
"Discharge Summary    Admission Date: 10/7/21    Discharge Date: 10/8/21    Diagnosis:Active Problems:     (spontaneous vaginal delivery)    Acute blood loss as cause of postoperative anemia    Pre-eclampsia in third trimester    Subjective: Pain controlled. Normal lochia. Eating, voiding and ambulating without difficulty. Breast feeding    /70   Pulse (!) 109   Temp 37 °C (98.6 °F) (Temporal)   Resp 16   Ht 1.753 m (5' 9\")   Wt 82.6 kg (182 lb)   LMP 02/10/2021 (Exact Date)   SpO2 96%   Breastfeeding Unknown   BMI 26.88 kg/m²     GEN: NAD  GI:soft, NT, ND  :fundus firm and below umbilicus  EXT:no edema    Hospital Course:Roxanna Guzmán is a 37 yo  who presented at 40w4d in labor. She did have intermittent elevated blood pressures but was asymptomatic. PreE labs were unremarkable outside of elevated urine protein/creatinine ratio.  She is s/p  of a viable male infant with APGARS 8/9 on 10/8/21. EBL of 650cc. Left labial and second degree laceration repaired. Post partum her blood pressures normalized and she remained asymptomatic. Her post partum course was complicated by asymptomatic anemia.  She was meeting all post partum goals and was stable for discharge home on PPD#1.     Discharge Instructions   1. Diet : general  2. Activity: Pelvic rest for 6 weeks    Current Outpatient Medications   Medication Sig Dispense Refill   • ibuprofen (MOTRIN) 600 MG Tab Take 1 Tablet by mouth every 6 hours as needed. 30 Tablet 0   • ferrous sulfate 325 (65 Fe) MG tablet Take 1 Tablet by mouth every day. 60 Tablet 0       Follow up: 6 weeks    Complications:none    Walker Mcnamara M.D.      "

## 2021-10-08 NOTE — LACTATION NOTE
This note was copied from a baby's chart.  Mom is a 35 y/o P1 who delivered baby boy weighing 8 # 2.9 oz at 40.4 wks. Mom reports darker and enlarged areolas during pregnancy. Mom denies any breast surgeries, diabetes, thyroid or fertility issues.Mom reports darker and enlarged areolas during pregnancy. Mom denies any breast surgeries, diabetes, thyroid or fertility issues.  Mom does have a hx of seizures and she is taking Vimpat which is an L3. This LC gave mom a copy of medication information from [facility].  Upon arrival to room mom was latching baby on left side. She was in cradle hold and slightly leaning over to left. LC assisted in sitting mom upright in bed and adjusting hips. Baby was brought more across tummy to tummy and aligned his ear, shoulder and hips. Baby latched easily and deeply. LC demonstrated hand expression and gave mother the information for website to view the [employer] video.  Discussed how to tell if baby is getting enough while nursing. Reviewed on serving for adequate output according to DOL and stool transition color. Mom has a pump at home for personal use and will return to work at 6 wks.     Breastfeeding plan:    Feed baby with feeding cues and at least a minimum of 8x/24 hours.  Expect cluster feeding as this is normal during early days of life.   Place skin to skin to promote feeding interest and milk production.    It is not recommended to use pacifiers or supplementing with formula until breast feeding and milk production is well established or at least 4 weeks.    Make sure infant is getting enough by ensuring frequent feedings as well as looking for transitioning stools from dark meconium to bright yellow/green seedy loose stools by day 5.     Follow up with PEDS PCP as scheduled for weight checks and to make sure feeding is progressing appropriately.    Call for breastfeeding for 1:1 lactation consultation through  [facility] (see information sheet) as needed and  attend the BF [employer] without need for appointment.

## 2021-10-09 VITALS
TEMPERATURE: 98.1 F | SYSTOLIC BLOOD PRESSURE: 112 MMHG | WEIGHT: 182 LBS | RESPIRATION RATE: 18 BRPM | DIASTOLIC BLOOD PRESSURE: 58 MMHG | OXYGEN SATURATION: 97 % | BODY MASS INDEX: 26.96 KG/M2 | HEART RATE: 86 BPM | HEIGHT: 69 IN

## 2021-10-09 PROBLEM — E87.1 HYPONATREMIA: Status: RESOLVED | Noted: 2021-02-23 | Resolved: 2021-10-09

## 2021-10-09 PROBLEM — H92.09 OTALGIA: Status: RESOLVED | Noted: 2018-02-27 | Resolved: 2021-10-09

## 2021-10-09 PROBLEM — F51.04 PSYCHOPHYSIOLOGICAL INSOMNIA: Status: RESOLVED | Noted: 2020-12-23 | Resolved: 2021-10-09

## 2021-10-09 PROBLEM — H69.93 DYSFUNCTION OF BOTH EUSTACHIAN TUBES: Status: RESOLVED | Noted: 2018-02-05 | Resolved: 2021-10-09

## 2021-10-09 PROBLEM — J30.1 ALLERGIC RHINITIS DUE TO POLLEN: Status: RESOLVED | Noted: 2020-12-23 | Resolved: 2021-10-09

## 2021-10-09 PROBLEM — J32.4 CHRONIC PANSINUSITIS: Status: RESOLVED | Noted: 2020-12-23 | Resolved: 2021-10-09

## 2021-10-09 PROBLEM — Z98.890 HX OF TYMPANOSTOMY TUBES: Status: RESOLVED | Noted: 2020-12-23 | Resolved: 2021-10-09

## 2021-10-09 PROCEDURE — 700102 HCHG RX REV CODE 250 W/ 637 OVERRIDE(OP): Performed by: OBSTETRICS & GYNECOLOGY

## 2021-10-09 PROCEDURE — A9270 NON-COVERED ITEM OR SERVICE: HCPCS | Performed by: OBSTETRICS & GYNECOLOGY

## 2021-10-09 RX ADMIN — PRENATAL WITH FERROUS FUM AND FOLIC ACID 1 TABLET: 3080; 920; 120; 400; 22; 1.84; 3; 20; 10; 1; 12; 200; 27; 25; 2 TABLET ORAL at 08:14

## 2021-10-09 RX ADMIN — IBUPROFEN 600 MG: 600 TABLET ORAL at 04:54

## 2021-10-09 ASSESSMENT — PAIN DESCRIPTION - PAIN TYPE: TYPE: ACUTE PAIN

## 2021-10-09 NOTE — PROGRESS NOTES
2015 Assessment complete. VSS. Fundus firm, lochia light. Pain controlled with PRN medications per MAR. Pt will call to request pain medications. FOB at bedside bonding with pt and baby. States voiding without difficulty. POC discussed. Encouraged to call with need. Call light in place

## 2021-10-09 NOTE — PROGRESS NOTES
Patient given discharge instructions voicing understanding. Car seat checked. Patient discharged to home with  and baby walking out with escort.

## 2021-10-09 NOTE — DISCHARGE SUMMARY
Obstetrics Discharge Summary    Admission Date: 10/7/2021         Discharge Date: 10/9/2021    ADMISSION DIAGNOSIS:  1. Term intrauterine pregnancy at 40+4 weeks  2. Labor  3. GBS positive  4. Advanced maternal age  5. Seizure disorder  6.  VSD    DISCHARGE DIAGNOSIS:  1. Term intrauterine pregnancy at 40+4 weeks  2. Labor  3. GBS positive  4. AMA  5. Seizure disorder  6.  VSD  7. Anemia secondary to acute blood loss    DETAILS OF HOSPITAL STAY  Presenting Problem/History of Present Illness: Active labor.     Hospital Course:  Patient is a 36 y.o. , who presented to [facility] at 40w4d with a chief complaint of contractions. She had prenatal care with [facility] with Dr. Mckinney. Pregnancy was complicated by Seizure disorder, AMA and GBS positive. For full details of the delivery please refer to the delivery note dictation. Briefly, the patient underwent an uncomplicated normal spontaneous vaginal delivery. There were no complications. Estimated blood loss was 650ml. The patient delivered a viable male infant (baby boy Isaish) infant weighing 3,710g (8lbs 2.9oz.) with Apgars as below in delivery summary. Patient’s recovery and postpartum course were unremarkable. By postpartum day #2 patient met all appropriate milestones and was stable to be discharged to home.    APGARs:   8   9      COMPLICATIONS: none    PHYSICAL EXAM:  Vitals:   Vitals:    10/09/21 0600   BP: 112/58   Pulse: 86   Resp: 18   Temp: 36.7 °C (98.1 °F)   SpO2: 97%   General: Alert, conversational, pleasant, no acute distress  CVS: Regular rate  PULM: No respiratory distress, symmetric expansion  ABD: Soft, non-tender, non-distended, fundus firm, non-tender, below the umbilicus  : Deferred  Extremities: Moves all, trace edema     LABS/STUDIES:   Results for DOE MURIEL RODRÍGUEZEVANS (MRN [id number]) as of 10/9/2021 08:03   Ref. Range 10/7/2021 08:20 10/8/2021 05:19   WBC Latest Ref Range: 4.8 - 10.8  K/uL 15.5 (H) 18.3 (H)   RBC Latest Ref Range: 4.20 - 5.40 M/uL 4.71 2.76 (L)   Hemoglobin Latest Ref Range: 12.0 - 16.0 g/dL 14.5 8.5 (L)   Hematocrit Latest Ref Range: 37.0 - 47.0 % 41.1 24.7 (L)   MCV Latest Ref Range: 81.4 - 97.8 fL 87.3 89.5   MCH Latest Ref Range: 27.0 - 33.0 pg 30.8 30.8   MCHC Latest Ref Range: 33.6 - 35.0 g/dL 35.3 (H) 34.4   RDW Latest Ref Range: 35.9 - 50.0 fL 43.7 45.1   Platelet Count Latest Ref Range: 164 - 446 K/uL 271 193   MPV Latest Ref Range: 9.0 - 12.9 fL 10.6 10.3     DISPOSITION: Home.    DISCHARGE MEDICATIONS:  - Ibuprofen 800 mg every 8 hours as needed for pain.  - Colace 100 mg twice daily.  - Ferrous sulfate 325 mg twice daily.    DISCHARGE INSTRUCTIONS:  1. Pelvic rest for 6 weeks postpartum.   2. Postpartum visit in 6 weeks at [facility] [phone].  3. Return to the emergency department if experiencing increased vaginal bleeding, severe pain, temperature greater than 100.4, or any other concerns.    DISCHARGE CONDITION: Stable.    Ro Denise M.D.

## 2021-10-09 NOTE — CARE PLAN
Problem: Psychosocial  Goal: Patient's ability to identify and develop effective coping behaviors will improve  Outcome: Progressing     Problem: Pain - Standard  Goal: Alleviation of pain or a reduction in pain to the patient’s comfort goal  Outcome: Progressing   The patient is Stable - Low risk of patient condition declining or worsening    Shift Goals  Clinical Goals: pain control, mobility  Patient Goals: pain control, mobility    Progress made toward(s) clinical / shift goals:  yes    Patient is not progressing towards the following goals:

## 2021-10-09 NOTE — DISCHARGE INSTRUCTIONS
PATIENT DISCHARGE EDUCATION INSTRUCTION SHEET  REASONS TO CALL YOUR OBSTETRICIAN  · Persistent fever, shaking, chills (Temperature higher than 100.4) may indicate you have an infection  · Heavy bleeding: soaking more than 1 pad per hour; Passing clots an egg-sized clot or bigger may mean you have an postpartum hemorrhage  · Foul odor from vagina or bad smelling or discolored discharge or blood  · Breast infection (Mastitis symptoms); breast pain, chills, fever, redness or red streaks, may feel flu like symptoms  · Urinary pain, burning or frequency  · Incision that is not healing, increased redness, swelling, tenderness or pain, or any pus from episiotomy or  site may mean you have an infection  · Redness, swelling, warmth, or painful to touch in the calf area of your leg may mean you have a blood clot  · Severe or intensified depression, thoughts or feelings of wanting to hurt yourself or someone else   · Pain in chest, obstructed breathing or shortness of breath (trouble catching your breath) may mean you are having a postpartum complication. Call your provider immediately   · Headache that does not get better, even after taking medicine, a bad headache with vision changes or pain in the upper right area of your belly may mean you have high blood pressure or post birth preeclampsia. Call your provider immediately    HAND WASHING  All family and friends should wash their hands:  · Before and after holding the baby  · Before feeding the baby  · After using the restroom or changing the baby's diaper    WOUND CARE  Ask your physician for additional care instructions. In general:  ·  Incision:  · May shower and pat incision dry   · Keep the incision clean and dry  · There should not be any opening or pus from the incision  · Continue to walk at home 3 times a day   · Do NOT lift anything heavier than your baby (over 10 pounds)  · Encourage family to help participate in care of the  to allow  rest and mom time to heal  · Episiotomy/Laceration  · May use kings-spray bottle, witch hazel pads and dermaplast spray for comfort  · Use kings-spray bottle after urinating to cleanse perineal area  · To prevent burning during urination spray kings-water bottle on labial area   · Pat perineal area dry until episiotomy/laceration is healed  · Continue to use kings-bottle until bleeding stops as needed  · If have a 2nd degree laceration or greater, a Sitz bath can offer relief from soreness, burning, and inflammation   · Sitz Bath   · Sit in 6 inches of warm water and soak laceration as needed until the laceration heals    VAGINAL CARE AND BLEEDING  · Nothing inside vagina for 6 weeks:   · No sexual intercourse, tampons or douching  · Bleeding may continue for 2-4 weeks. Amount and color may vary  · Soaking 1 pad or more in an hour for several hours is considered heavy bleeding  · Passing large egg sized blood clots can be concerning  · If you feel like you have heavy bleeding or are having increasing amount of blood clots call your Obstetrician immediately  · If you begin feeling faint upon standing, feeling sick to your stomach, have clammy skin, a really fast heartbeat, have chills, start feeling confused, dizzy, sleepy or weak, or feeling like you're going to faint call your Obstetrician immediately    HYPERTENSION   Preeclampsia or gestational hypertension are types of high blood pressure that only pregnant women can get. It is important for you to be aware of symptoms to seek early intervention and treatment. If you have any of these symptoms immediately call your Obstetrician    · Vision changes or blurred vision   · Severe headache or pain that is unrelieved with medication and will not go away  · Persistent pain in upper abdomen or shoulder   · Increased swelling of face, feet, or hands  · Difficulty breathing or shortness of breath at rest  · Urinating less than usual    URINATION AND BOWEL MOVEMENTS  · Eating  "more fiber (bran cereal, fruits, and vegetables) and drinking plenty of fluids will help to avoid constipation  · Urinary frequency and urgency after childbirth is normal  · If you experience any urinary pain, burning or frequency call your provider    BIRTH CONTROL  · It is possible to become pregnant at any time after delivery and while breastfeeding  · Plan to discuss a method of birth control with your physician at your post delivery follow up visit    POSTPARTUM BLUES  During the first few days after birth, you may experience a sense of the \"blues\" which may include impatience, irritability or even crying. These feelings come and go quickly. However, as many as 1 in 10 women experience emotional symptoms known as postpartum depression.     POSTPARTUM DEPRESSION    May start as early as the second or third day after delivery or take several weeks or months to develop. Symptoms of \"blues\" are present, but are more intense: Crying spells; loss of appetite; feelings of hopelessness or loss of control; fear of touching the baby; over concern or no concern at all about the baby; little or no concern about your own appearance/caring for yourself; and/or inability to sleep or excessive sleeping. Contact your Obstetrician if you are experiencing any of these symptoms     PREVENTING SHAKEN BABY  If you are angry or stressed, PUT THE BABY IN THE CRIB, step away, take some deep breaths, and wait until you are calm to care for the baby. DO NOT SHAKE THE BABY. You are not alone, call a supporter for help.  · [facility] 24/7 crisis call line (563-177-2860) or (1-854.517.4501)  · You can also text them, text \"ANSWER\" (930157)      "

## 2021-10-09 NOTE — LACTATION NOTE
This note was copied from a baby's chart.  Follow up lactation visit. Mom preparing for discharge. Mom reports baby has fed well throughout the night. Stools are transitioning to brownish color. Baby has had 2 large voids reviewed normal output with mom and FOB. FOB asked LC about small bump on baby's head at crown. Baby does have a mildly pointed head as well. LC reviewed normal head molding during delivery and that the head looks normal. However, referred to bedside RN to assess and educate patient.   Discussed feeding plan with mom and to be aware of swallows and efficacy of feeds. Mom will follow up with peds Monday or Tuesday and LC encouraged to call earlier if parents have any concerns.  Provided handouts for outpatient breastfeeding support [place] and phone number for private lactation consults

## 2021-10-09 NOTE — DISCHARGE PLANNING
"Discharge Planning Assessment Post Partum    Reason for Referral: Hx of depression/THC  Address: [facility] [address] [place] [address]  Type of Living Situation:Lives with spouse  Mom Diagnosis:     (spontaneous vaginal delivery  Baby Diagnosis: Birth -APGARS 8/9  Primary Language: English    Name of Baby: Aniceto Rice  Father of the Baby: Dru Rice  Involved in baby’s care? Yes. Per MOB, FOB is with the baby now as baby is getting circumcised  Contact Information: [phone]  Prenatal Care: Yes  Mom's PCP: Chapis Alexis  PCP for new baby:Katy Levi    Support System: MOB reports her parents, friends, and staff  Coping/Bonding between mother & baby: Yes, per MOB  Source of Feeding: Breastfeeding. MOB reports,\" baby is getting the hang of it.\"  Supplies for Infant: Car seat, bassinet, crib, and etc.    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   Mom's Mental status: Alert and oriented. Mood: \"good\" /Affect: Congruent with mood. MOB denied suicidal ideation.  Services used prior to admit: None    CPS History: No  Psychiatric History: Yes, MOB reports his of depression and suicide attempt in  during her residency. MOB reports she stopped taking medication 2 years ago and started therapy with Latrell Marie twice a month. MOB reports she will set up an follow up appointment with Latrell Marie when she and the baby have a schedule. MOB reports she has researched Post partum depression and declined additional education. MOB declined needing a counseling service list or post partum supports and resources.   Domestic Violence History: None  Drug/ETOH History: None, per MOB. Urine drug screen on baby was negative.    Resources Provided: None  Referrals Made: None     Clearance for Discharge: MOB and baby are cleared by . Bedside RN " updated.

## 2021-10-09 NOTE — CARE PLAN
The patient is Stable - Low risk of patient condition declining or worsening    Shift Goals  Clinical Goals: breastfeeding    Problem: Psychosocial - Postpartum  Goal: Patient will verbalize and demonstrate effective bonding and parenting behavior  Outcome: Progressing     Problem: Altered Physiologic Condition  Goal: Patient physiologically stable as evidenced by normal lochia, palpable uterine involution and vitals within normal limits  Outcome: Progressing  Note: VSS. Fundus firm and lochia scant.

## 2021-10-14 ENCOUNTER — OFFICE VISIT (OUTPATIENT)
Dept: OBGYN | Facility: CLINIC | Age: 36
End: 2021-10-14
Payer: COMMERCIAL

## 2021-10-14 DIAGNOSIS — S20.129A MILK BLEB, POSTPARTUM: ICD-10-CM

## 2021-10-14 DIAGNOSIS — O92.29 MILK BLEB, POSTPARTUM: ICD-10-CM

## 2021-10-14 DIAGNOSIS — O92.29 SORE NIPPLES DUE TO LACTATION: ICD-10-CM

## 2021-10-14 PROCEDURE — 99215 OFFICE O/P EST HI 40 MIN: CPT | Performed by: NURSE PRACTITIONER

## 2021-10-14 RX ORDER — TRIAMCINOLONE ACETONIDE 1 MG/G
1 CREAM TOPICAL 3 TIMES DAILY
Qty: 15 G | Refills: 0 | Status: SHIPPED | OUTPATIENT
Start: 2021-10-14 | End: 2021-10-21

## 2021-10-14 NOTE — PROGRESS NOTES
Summary: Sleepy baby for the first 36 hours then once home milk in and breastfeeding established. Sore on the left nipple. Exclusive breastmilk and at the breast. Above birth weight at day 7.  Today Independently latched to the right side, no pain and easily removed 2oz.Content and we practiced with the Cook Sta pump for 20ml then offered baby left side for an additional 0.8oz. Pumped with Platinum for less than 5ml. Learning the Cook Sta.   Plan Continue with exclusive breastfeeding, todays weights who 1/3 milk on the left, 2/3 on the right, baby very effective at the breast. Pump once in the morning for practice, trade willow and spectra (settings in the notes). Introduce a bottle by 4 weeks, but sooner if desired given excellent weight gain for baby.   Follow up Ped appt 2 week [facility] and Lactation as needed but follow weight to make any adjustments with good production and pumping.     Subjective:   Roxanna Guzmán is a 36 y.o. female here for lactation care. She is here today with baby and  (Dru).    Concerns:   nipple pain , questions about pumping and bottles    HPI:   Mother does not have a history of GDM, hypertension prior to pregnancy, insulin resistance, multiple gestation, PCOS and thyroid disease. These are common conditions which may interfere with milk supply.     Mother does have advanced maternal age. This is a condition these are conditions that may interfere in milk supply.  Other risk factors Hx of anxiety    FEEDING HISTORY:    Past breastfeeding history:  First baby   Hospital course: Exclusive breastfeeding  Currently 10/14/2021  Sore on the left nipple. Exclusive breastmilk and at the breast.    Both breasts: Yes offers both, not always interested  Bottle feeds: 0x/24h    Supplement:   None     Nipple Shield Use: None    Breast Pumping:   Not pumping    Maternal ROS:  Constitutional: No fever, chills. Feeling well  Breasts: No soreness of breasts and Soreness of nipples on the  left only  Psychiatric: Managing ok  Mental Health: No mention of feeling irritable, agitated, angry, overwhelmed, apathy, exhaustion nor having sleep changes outside infant feeds/demands or appetite changes     Objective:     Maternal Physical   General: No acute distress  Breasts: Symetrical , Full, Plugged Duct - no evidence and Mastitis  - no S/S  Nipples: intact bleb on left nipple 1o clock position  Psychiatric:  Normal mood and affect. Her behavior is normal. Judgment and thought content normal   Mental Health:  Did NOT exhibit sadness, crying, feeling overwhelmed, agitation or hypervigilance.     Assessment/Plan & Lactation Counseling:     Infant exam on infant chart    Infant Weight History:   10/7/21 8#2.9oz  10/14/2021     8#7.8oz    Infant intake at Breast:: Riht 2.0oz     Left 0.8oz    Total: 2.8oz  Milk Transfer at this feeding:   Effective breastfeeding  Pumped: Type of Pump: Platinum  And willow  Quantity Pumped:  Total: 20ml  Initiation of Feeding: Infant initiates  Position of Feeding:    Right: cross cradle  Left: cross cradle  Attachment Achieved: rapidly  Nipple shield: N/A      Suck Pattern at the breast: Suck burst and normal rest  Behavior Following Observed Feeding: content  Nipple Pain from:Contact forces of the tongue causing nipple strain resulting in damage     Latch: Mom latches independently  Suckling/Feeding: attaches, baby fed effectively, baby roots, elicits INEZ and rhythmic  Milk Supply Available: normal      Maternal Diagnosis/Problem:  Sore nipples  Bleb    BREASTFEEDING PLAN  Discussed concerns and symptoms as listed above in assessment and guidance summarized below.  • Topics reviewed included:  •  Maternal Mental Health: Having a new baby can be joyful time for some new moms, but a difficult time for others. For all, it is a time of profound physical and emotional change. Balancing baby, family, partners and friends, work, pets, and preexisting or new life stressors . Good  support system established.Sleeping well.  •  Self -care through relational support and interaction.   o Encouraged  support, rest, getting out of the house each day, walk or drive somewhere,  a coffee/tea at a drive through  • Allow others to bring you food, help with chores and errands, meeting for a cup of coffee    •  Feeding:   o Baby back to birth weight so evidence of reading him well and meeting his needs  o Feed your baby every 1.5-3 hours, more often if baby acts hungry.   o Awaken baby for feeding if going over 3 hours in the day.   o Given infants weight you may allow baby to go longer at night but that generally means shorter durations in the day.    •  Supplement:   • No supplement is needed     When bottle-feeding, there are three primary things to consider:    o Nipple Shape:  - Look for a nipple that looks like a “breast at work” not a “breast at rest.”  A “breast at work” has a somewhat cone shape as the nipple and breast tissue is pulled into the baby’s mouth while feeding.  Your baby’s mouth should be able to go around the widest part of the nipple to form a wide-open gape on the bottle like that of a good latch at the breast. In contrast, a breast at rest might look more like, well, a breast: a roundish base with a  nipple.  If the bottle looks like this, your baby’s lips may not be able to get around the widest part of the nipple because it is just too wide resulting in a narrow gape that would hurt your nipple. This nipple shape may also make it difficult for your baby to make a complete seal with their lips which leads to air intake and milk spillage.Wide or narrow neck bottles are your choice.   o Flow Rate of the Nipple:  - The nipple flow should be slow.  Don’t just read the label, but notice how your baby is feeding and trust what you observe.  A study done a few years ago found that “slow flow” varied widely between brands and even between nipples of the same brand.  Try several  "nipples until you find one that results in a rhythmic sucking pattern but not chugging and gulping.  o Pacing the feeding:  - A slow flow nipple helps, but how you feed the baby is more important.  Good positioning can compensate for a faster flow nipple.  When bottle-feeding, the baby should control how much is consumed at a feeding.  Holding the baby in an upright position with the bottle horizontal ensures that the baby gets milk only when sucking.  Here is a nice video demonstrating this concept of paced bottle feeding,  https://www.[employer].com/watch?v=GwUXY5zCN1K    •  Latch on Techniques for bare breast.   o Modify for nipple shield use soon enough you will move back to bare breast.  o Fine tune latch:  - By holding your baby more securely at the breast, assisting your baby to stay attached by:  • Bringing your baby to your breast, not breast to the baby  • Your baby's cheek to touch breast securely, nose tipped back  • Hold your baby firmly in place so when your baby forgets to suck and picks it back up again your baby is in the correct spot. You will be extinguishing behavior and replacing it with a deeper latch to stimulate suck and provide satisfaction at the breast.  • Your baby needs as much breast as deep in the mouth as possible to allow your nipples to heal and for you more importantly to maximize efficiency at the breast  o Latch is asymmetrical, leading with the chin, getting the baby below the breast, as if offering a large \"deli agustin sandwich\".    •  Pumping Guidelines:  o Both breasts   o Pump 1 times in 24 hours but may only practice 3-5x/week, no hurry at this time  o Type of pump:  [employer]   Spectra Settings  1.  Press letdown button when first starting         § Cycle: 70 / Vacuum: L1 - L 5 (To comfort)  2. Once milk lets down, press letdown button again  § Cycle: 54 / Vacuum: L1 - L12 (To comfort)  - Always double pump  o How long will vary woman to woman, typically 8-15 minutes, or 1-2 " minutes after flow slows  o Flange Fit: Freely moving nipple in the tunnel with some movement of the areola.  - Today's evaluation indicates a flange change would be appropriate on the right breast and not urgent. Let the nipples adjust to the elasticity of the breast. Pain with pumping indicates flanges being more critically assessed.     • Storage (Acceptable guidelines for healthy term babies)  o 10 hours at room temp including your pieces, may rinse but not mandatory  o 8 days refrigerator, don't need  to refrigerate right away if using fresh pumped milk at the next feeding  o Freezer 1 year  o Deep freezer 2 years  o If baby drinks breastmilk from a fresh or refrigerated bottle of breastmilk,  you may return the unused portion to the refrigerator and use once at next feeding.   • Nipple care:  • May apply breastmilk  • Moist-oily ointment after feeding/pumping, ie Lanolin nipple butter, coconut or olive oil, if desired/needed 2-3 times/day until nipples are healed  • You do not need to wash this off before pumping or feeding the baby    •  Breast Care    BLEB:   This appears as a small white dot on the tip of the nipple and is usually very painful. It is one milk duct that has become plugged.         Remedy:   • Soften the plug with a warm compress, keep the area moist with olive or coconut oil, nipple butter or lanolin.   May apply those oils to a cotton ball to keep that area moist to allow the covering to soften for removal.   Or  Apply steroid cream and may cover steroid cream with hydrogel dressing.    ORDERED 0.1% triamcinolone cream applied three times a day for the first week, and if much better, then decrease to twice a day for 3-4 days, and as long as continuing to improve, decrease to once a day for 3-4 days, then stop.Dispense 15g tube This topical steroid is being used to reduce pain and thin the inflamed, fibrinous tissue obstructing the nipple orifice.   If it is used three times a day regularly  and stopped abruptly, the rash can rebound.  If  chronic, may need something a bit stronger    •  Connect with other mothers:  o Facebook:   - [place] [facility]: https://www.facebook.com/[place].breastfeeds/  - Well-Nourished Babies (Private group for questions and support): https://www.facebook.com/groups/345715550462336/  o Weight Check Group  - Tuesdays 10am - 11am. Women's Health at [address], 3rd floor conference room  - Come and check your baby's weight, do a feeding and see how your baby is growing, visit with other mothers, plan on a walk or coffee date after group.  • Please wear a mask  • Due to space limitations - no strollers please (Fresh c/section moms should use the stroller)  • We would love to have dads stay, but moms won't breastfeed.  • The room is only available from 10am -11am, there is a meeting prior and after.   • All diapers must be taken with you   o Breastfeeding [place]   - This is an emotional, informational and safe support [place] with your like-minded community that builds solidarity among moms  - The honest experiences of mothers are highly valued among the other mothers  - Tuesday  at 11am by [employer],  you will be sent an invitation each week, please unsubscribe as you wish  - You may instead copy and paste this link: [email or web address]    - Passcode  [id number]  - You may share this link with friends; please don't post on social media.      In Conclusion:   Family present has verbalized what they can realistically do based on family dynamics, understanding a plan has to be doable to be effective and can be renegotiated at any time.  This is a complex and intimate journey. When obstacles present themselves, it takes confidence, persistence and support. You are now the focus of our Breastfeeding Medicine team; we are here to support your decisions and goals.      Follow up  Mom is encouraged to e-mail to update  how the plan is working.    Pediatrician appointment: 2 week [facility]    Follow-up for infant weight check and dyad breastfeeding evaluation in 2  week(s)  Please call [phone] if you have not scheduled your next appointment    A total of 95 minutes, not including infant assessment time, with more than 50% was spent preparing to see the patient, obtaining and reviewing separately obtained history, performing a medically appropriate examination and evaluation, counseling and educating the family, ordering medications, test or procedures,  documenting clinical information in the electronic health record, independently interpreting weighted feeds and infant growth results, communicating these results to the family and care coordination as detailed in the above note.       FRED Hernandez.

## 2021-11-01 ENCOUNTER — OFFICE VISIT (OUTPATIENT)
Dept: OBGYN | Facility: CLINIC | Age: 36
End: 2021-11-01
Payer: COMMERCIAL

## 2021-11-01 DIAGNOSIS — O92.70 LACTATION PROBLEM: ICD-10-CM

## 2021-11-01 DIAGNOSIS — O92.29 SORE NIPPLES DUE TO LACTATION: ICD-10-CM

## 2021-11-01 DIAGNOSIS — O92.29 MILK BLEB, POSTPARTUM: ICD-10-CM

## 2021-11-01 DIAGNOSIS — S20.129A MILK BLEB, POSTPARTUM: ICD-10-CM

## 2021-11-01 PROCEDURE — 99215 OFFICE O/P EST HI 40 MIN: CPT | Performed by: NURSE PRACTITIONER

## 2021-11-01 NOTE — PROGRESS NOTES
Summary: Exclusive breastmilk, mostly breastfeeding, occasional bottle. Pumping 2x/day to save milk. Slow gain over the the 2-3 week. Consistently offered both sides and weight gain is within normal limits now. Left nipple persistently sore, bleb remains but that is not the source of the pain. Uses the spectra pump, willow seems to small causing pain to nipples and not getting same volumes as the spectra pump.    Moms hands with neuropathy, numbness, more than with carpal tunnel syndrome. Today Overcorrected latch on the left side and less pain experienced.  Moved baby  On the left breast towards the right before latching Independently. No nipple creasing. Removed 2.3oz and sleeping deeply. Mom has noted that in the day he can take both sides, so diaper changed and moved to the right for an additional 1.9oz. Total inake 4.2oz.    Plan Continue with exclusive breastfeeding,bottles several times per week in preparation for return to work. Adjust latch to decrease pain. May use NS on that side. Seeing OB today to evaluate hand neuropathy.  Baby needs both sides throughout the day for good gain  Follow up Ped appt 2 month [facility] and Lactation in 10-14 days.     Subjective:   Roxanna Guzmán is a 36 y.o. female here for lactation care. She is here today with baby.    Concerns:   nipple pain , hand numbness, latch, weight gain    HPI:   Mother does not have a history of GDM, hypertension prior to pregnancy, insulin resistance, multiple gestation, PCOS and thyroid disease. These are common conditions which may interfere with milk supply.     Mother does have advanced maternal age. This is a condition these are conditions that may interfere in milk supply.  Other risk factors Hx of anxiety    FEEDING HISTORY:    Past breastfeeding history:  First baby   Hospital course: Exclusive breastfeeding  Prior to consultation on 10/14/2021  Sore on the left nipple. Exclusive breastmilk and at the breast.  Currently  11/1/2021Exclusive breastmilk, mostly breastfeeding, occasional bottle. Pumping 2x/day to save milk. Slow gain over the the 2-3 week. Consistently offered both sides and weight gain is within normal limits now. Left nipple persistently sore, bleb remains but that is not the source of the pain.     Both breasts: Yes offers both, not always interested but mom works with him except at night  Bottle feeds: 0x/24h    Supplement:   Expressed milk when using a bottle    Nipple Shield Use: None    Breast Pumping:   Not pumping    Maternal ROS:  Constitutional: No fever, chills. Feeling well  Breasts: No soreness of breasts and Soreness of nipples on the left only persists  Psychiatric: Managing ok  Mental Health: No mention of feeling irritable, agitated, angry, overwhelmed, apathy, exhaustion nor having sleep changes outside infant feeds/demands or appetite changes     Objective:     Maternal Physical   General: No acute distress  Breasts: Symetrical , Full, Plugged Duct - no evidence and Mastitis  - no S/S  Nipples: intact, bleb on left nipple 1o clock position persists  Psychiatric:  Normal mood and affect. Her behavior is normal. Judgment and thought content normal   Mental Health:  Did NOT exhibit sadness, crying, feeling overwhelmed, agitation or hypervigilance.     Assessment/Plan & Lactation Counseling:     Infant exam on infant chart    Infant Weight History:   10/7/21 8#2.9oz  10/14/2021     8#7.8oz  11/1/2021     9#3.8oz    Infant intake at Breast:: Left 2.3oz  Right 1.9oz     Total: 4.2oz  Milk Transfer at this feeding:   Effective breastfeeding  Initiation of Feeding: Infant initiates  Position of Feeding:    Right: cross cradle  Left: cross cradle  Attachment Achieved: rapidly  Nipple shield: N/A      Suck Pattern at the breast: Suck burst and normal rest  Behavior Following Observed Feeding: content  Nipple Pain from:Contact forces of the tongue causing nipple strain resulting in damage     Latch: Mom latches  "independently  Suckling/Feeding: attaches, baby fed effectively, baby roots, elicits INEZ and rhythmic  Milk Supply Available: normal to abundant, able to pump 7-8oz extra per day    Maternal Diagnosis/Problem:  Sore nipples  Bleb  Lactation problem  Slowed infant growth    BREASTFEEDING PLAN  Discussed concerns and symptoms as listed above in assessment and guidance summarized below.  Topics reviewed included:  •  Maternal Mental Health: Has professional support. Discussed difficulty with this  time    •  Feeding:   o Wake for both sides in the day  o Every 3 hours sufficient  o Both sides at night to facilitate longer stretch of sleep or [place] other side for comfort  o Weight is consistent with a month old with given birth weight    •  Supplement:   - No supplement is needed    •  Latch on Techniques for bare breast.   o When you start him on the left breast where his mouth is better aligned with the nipple, so his body therefore is further over to moms left.  o Latch is asymmetrical, leading with the chin, getting the baby below the breast, as if offering a large \"deli agustin sandwich\".    •  Pumping Guidelines:  o Both breasts   - Routine is good with 2 pumps per day   • Nipple care:  • May apply breastmilk  • Moist-oily ointment after feeding/pumping, ie Lanolin nipple butter, coconut or olive oil, if desired/needed 2-3 times/day until nipples are healed  • You do not need to wash this off before pumping or feeding the baby   • Bleb has triamcinolone cream if desired for the bleb, but the bleb does not seem to be the point of pain.     •  Connect with other mothers:  o Weight Check Group  -  10am - 11am. Women's Health at [address], 3rd floor conference room  - Come and check your baby's weight, do a feeding and see how your baby is growing, visit with other mothers, plan on a walk or coffee date after group.  • Please wear a mask  • Due to space limitations - no strollers please " (Fresh c/section moms should use the stroller)  • We would love to have dads stay, but moms won't breastfeed.  • The room is only available from 10am -11am, there is a meeting prior and after.   • All diapers must be taken with you   o Breastfeeding [place]   - This is an emotional, informational and safe support [place] with your like-minded community that builds solidarity among moms  - The honest experiences of mothers are highly valued among the other mothers  - Tuesday  at 11am by [employer],  you will be sent an invitation each week, please unsubscribe as you wish  - You may instead copy and paste this link: [email or web address]    - Passcode  [id number]  - You may share this link with friends; please don't post on social media.       Follow up  Mom is encouraged to e-mail to update how the plan is working.    Pediatrician appointment: 2 month [facility]    Follow-up for infant weight check and dyad breastfeeding evaluation in 10 days to 2  week(s)  Please call [phone] if you have not scheduled your next appointment    A total of 70 minutes, not including infant assessment time, with more than 50% was spent preparing to see the patient, obtaining and reviewing separately obtained history, performing a medically appropriate examination and evaluation, counseling and educating the family, ordering medications, test or procedures,  documenting clinical information in the electronic health record, independently interpreting weighted feeds and infant growth results, communicating these results to the family and care coordination as detailed in the above note.       FRED Hernandez.

## 2021-11-09 DIAGNOSIS — G43.709 CHRONIC MIGRAINE W/O AURA W/O STATUS MIGRAINOSUS, NOT INTRACTABLE: ICD-10-CM

## 2021-11-18 ENCOUNTER — OFFICE VISIT (OUTPATIENT)
Dept: OBGYN | Facility: CLINIC | Age: 36
End: 2021-11-18
Payer: COMMERCIAL

## 2021-11-18 DIAGNOSIS — O92.29 MILK BLEB, POSTPARTUM: ICD-10-CM

## 2021-11-18 DIAGNOSIS — O92.70 LACTATION PROBLEM: ICD-10-CM

## 2021-11-18 DIAGNOSIS — S20.129A MILK BLEB, POSTPARTUM: ICD-10-CM

## 2021-11-18 DIAGNOSIS — O92.29 SORE NIPPLES DUE TO LACTATION: ICD-10-CM

## 2021-11-18 PROCEDURE — 99215 OFFICE O/P EST HI 40 MIN: CPT | Performed by: NURSE PRACTITIONER

## 2021-11-18 NOTE — PROGRESS NOTES
Summary: Exclusive breastmilk, mostly breastfeeding, occasional bottle. Pumping 2x/day to save milk. Gain just under and ounce per day but length increased significantly accounting for weight. Mom having difficulty with baby staying latched and the on/off behavior is causing pain. New bleb on the right side, left side healed.  More difficulty in the afternoon (less supply). Oral exam consistent with high compression and tongue not 100% effective, referred to Ped PT for evaluation. Frenum exists but type 3 and less likely the causative issue.   Uses the spectra pump. Moms hands with neuropathy, numbness, more than with carpal tunnel syndrome, injection provided no relief, will have surgery in December which is an added stressor.   Today Tried the nipple shield without success for transfer of milk. Infant intake at Breast:: Left 2.3oz  Right after  0.5oz  +0.2ozTotal: 3.0oz. Discussed alignment and position to remain asymmetrical.   Plan Continue with exclusive breastfeeding, BUT, with pain, stop the feeding and go to the left side, finish and bottles if needed.  Skip pumping if first time to skip, pump if second time.  Baby needs both sides throughout the day for good gain  Follow up Ped appt 2 month [facility] 12/7/21, and Lactation to follow up as there is still pain with breastfeeding. Referred  To Ped PT Johanna Guerrero [phone]     Subjective:   Roxanna Guzmán is a 36 y.o. female here for lactation care. She is here today with baby.    Concerns:   nipple pain , hand numbness, latch, weight gain    HPI:   Mother does not have a history of GDM, hypertension prior to pregnancy, insulin resistance, multiple gestation, PCOS and thyroid disease. These are common conditions which may interfere with milk supply.     Mother does have advanced maternal age. This is a condition these are conditions that may interfere in milk supply.  Other risk factors Hx of anxiety    FEEDING HISTORY:    Past breastfeeding  history:  First baby   Hospital course: Exclusive breastfeeding  Prior to consultation on 10/14/2021  Sore on the left nipple. Exclusive breastmilk and at the breast.   Prior to consultation on 11/1/2021 Exclusive breastmilk, mostly breastfeeding, occasional bottle. Pumping 2x/day to save milk. Slow gain over the the 2-3 week. Consistently offered both sides and weight gain is within normal limits now. Left nipple persistently sore, bleb remains but that is not the source of the pain.   Currently 11/18/2021 Exclusive breastmilk, mostly breastfeeding, occasional bottle. Pumping 2x/day to save milk. Gain just under and ounce per day but length increased significantly accounting for weight. Mom having difficulty with baby staying latched and the on/off behavior is causing pain. New bleb on the right side, left side healed.  More difficulty in the afternoon (less supply). Oral exam consistent with high compression and tongue not 100% effective, referred to Ped PT for evaluation.    Both breasts: Yes offers both, not always interested but mom works with him except at night  Bottle feeds: 1x/24h and if not available.     Supplement:   Expressed milk when using a bottle    Nipple Shield Use: Attempted with very poor transfer, not worth the protection to the nipple    Breast Pumping:   Pumping in the morning and putting away 9oz each day, pumps late morning as needed for occassional bottle if out    Maternal ROS:  Constitutional: No fever, chills. Feeling well  Breasts: No soreness of breasts and Soreness of nipples on the right now, left better  Psychiatric: Managing ok  Mental Health: No mention of feeling irritable, agitated, angry, overwhelmed, apathy, exhaustion nor having sleep changes outside infant feeds/demands or appetite changes     Objective:     Maternal Physical   General: No acute distress  Breasts: Symetrical , Full, Plugged Duct - no evidence and Mastitis  - no S/S  Nipples: intact, bleb on right nipple 1 o  clock position, left side heals  Psychiatric:  Normal mood and affect. Her behavior is normal. Judgment and thought content normal   Mental Health:  Did NOT exhibit sadness, crying, feeling overwhelmed, agitation or hypervigilance.     Assessment/Plan & Lactation Counseling:     Infant exam on infant chart    Infant Weight History:   10/7/21 8#2.9oz  10/14/2021     8#7.8oz  11/1/2021     9#3.8oz  11/18/2021  10#5.6oz  59cm    Infant intake at Breast:: Left 2.3oz  Right 0.5 +0.2     Total: 3.0oz  Milk Transfer at this feeding:   Effective breastfeeding when hungry and bare breast.   Initiation of Feeding: Infant initiates  Position of Feeding:    Right: cross cradle  Left: cross cradle  Attachment Achieved: rapidly  Nipple shield: Attempted with minimal milk removal      Suck Pattern at the breast: Suck burst and normal rest  Behavior Following Observed Feeding: content  Nipple Pain from:Contact forces of the tongue causing nipple strain resulting in damage    White tongue, 2 tone lips suggesting an inconsistent movement of the tongue    Latch: Mom latches independently  Suckling/Feeding: attaches, baby fed effectively, baby roots, elicits INEZ and rhythmic  Milk Supply Available: normal to abundant, able to pump 9oz extra per day, freezes it and then dad gives frozen milk in the morning bottle 3-4oz easily.     Maternal Diagnosis/Problem:  Sore nipple right now  Bleb right nipple now  Lactation problem - increasingly wiggly at the breast, not staying attached, pulling off  At risk for PPD    BREASTFEEDING PLAN  Discussed concerns and symptoms as listed above in assessment and guidance summarized below.  Topics reviewed included:  •  Maternal Mental Health: Has professional support. More discussion on failing, difficulty with breastfeeding which is also difficulty just trying to do something with no control and added concern about surgery.     •  Feeding:   o Wake for both sides in the day  o Every 3 hours sufficient,  "he seems to do better if he decides - that is fair.   o Both sides at night to facilitate longer stretch of sleep or [place] other side for comfort  o Weight is consistent   - Small change is made up with extra length    •  Supplement:   - No supplement is needed, ample breastmilk.    •  Latch on Techniques for bare breast.   o When you start him on the left breast where his mouth is better aligned with the nipple, so his body therefore is further over to moms left, and now as well on the right.  - He is so long that he can easily hit the side of the chair or couch and cause the position and latch to be off, they love to touch something with their feet and exercise the stepping reflex.  o Latch is asymmetrical, leading with the chin, getting the baby below the breast, as if offering a large \"deli agustin sandwich\".    •  Pumping Guidelines:  - For pain on the right side, skip the feeding. Pump if you have skipped 2 in a row  - Plan on pumping if you know you are not going to offer either side, like evening/night  - Turn suction down a little to allow for some healing  • May need an extra minute to pump but may be worthwhile  • Nipple care:  • May apply breastmilk  • Moist-oily ointment after feeding/pumping, ie Lanolin nipple butter, coconut or olive oil, if desired/needed 2-3 times/day until nipples are healed  • You do not need to wash this off before pumping or feeding the baby   • Bleb has triamcinolone cream if desired for the bleb, but the bleb does not seem to be the point of pain.     •  Connect with other mothers:  o Weight Check Group  - Tuesdays 10am - 11am. [facility] at [facility] [place], [address] [place] [address], 3rd floor conference room  - Come and check your baby's weight, do a feeding and see how your baby is growing, visit with other mothers, plan on a walk or coffee date after group.  • Please wear a mask  • Due to space limitations - no strollers please (Fresh c/section moms should use the stroller)  • We would " love to have dads stay, but moms won't breastfeed.  • The room is only available from 10am -11am, there is a meeting prior and after.   • All diapers must be taken with you   o Breastfeeding [place]   - This is an emotional, informational and safe support [place] with your like-minded community that builds solidarity among moms  - The honest experiences of mothers are highly valued among the other mothers  - Tuesday  at 11am by [employer],  you will be sent an invitation each week, please unsubscribe as you wish  - You may instead copy and paste this link: [email or web address]    - Passcode  [id number]  - You may share this link with friends; please don't post on social media.       Follow up  Mom is encouraged to e-mail to update how the plan is working.    Pediatrician appointment: 2 month [facility], December 7, 2021      Follow-up for infant weight check and dyad breastfeeding evaluation as needed but follow up to your concerns is important. Please call [phone] if you have not scheduled your next appointment    A total of 81 minutes, not including infant assessment time, with more than 50% was spent preparing to see the patient, obtaining and reviewing separately obtained history, performing a medically appropriate examination and evaluation, counseling and educating the family, ordering medications, test or procedures, referral, documenting clinical information in the electronic health record, independently interpreting weighted feeds and infant growth results, communicating these results to the family and care coordination as detailed in the above note.       FRED Hernandez.

## 2021-11-29 ENCOUNTER — OFFICE VISIT (OUTPATIENT)
Dept: NEUROLOGY | Facility: MEDICAL CENTER | Age: 36
End: 2021-11-29
Attending: PSYCHIATRY & NEUROLOGY
Payer: COMMERCIAL

## 2021-11-29 VITALS
OXYGEN SATURATION: 100 % | HEIGHT: 69 IN | BODY MASS INDEX: 24.29 KG/M2 | TEMPERATURE: 98.7 F | DIASTOLIC BLOOD PRESSURE: 80 MMHG | HEART RATE: 87 BPM | WEIGHT: 164.02 LBS | RESPIRATION RATE: 12 BRPM | SYSTOLIC BLOOD PRESSURE: 124 MMHG

## 2021-11-29 DIAGNOSIS — G43.709 CHRONIC MIGRAINE W/O AURA W/O STATUS MIGRAINOSUS, NOT INTRACTABLE: ICD-10-CM

## 2021-11-29 DIAGNOSIS — G40.909 SEIZURE DISORDER (HCC): ICD-10-CM

## 2021-11-29 DIAGNOSIS — M54.16 LUMBAR RADICULOPATHY: ICD-10-CM

## 2021-11-29 PROCEDURE — 99214 OFFICE O/P EST MOD 30 MIN: CPT | Mod: 25 | Performed by: PSYCHIATRY & NEUROLOGY

## 2021-11-29 PROCEDURE — 64615 CHEMODENERV MUSC MIGRAINE: CPT | Performed by: PSYCHIATRY & NEUROLOGY

## 2021-11-29 PROCEDURE — 700111 HCHG RX REV CODE 636 W/ 250 OVERRIDE (IP): Performed by: PSYCHIATRY & NEUROLOGY

## 2021-11-29 PROCEDURE — 999999 HB NO CHARGE: Performed by: PSYCHIATRY & NEUROLOGY

## 2021-11-29 RX ORDER — LACOSAMIDE 200 MG/1
200 TABLET, FILM COATED ORAL DAILY
Qty: 30 TABLET | Refills: 5 | Status: SHIPPED | OUTPATIENT
Start: 2021-11-29 | End: 2022-05-31

## 2021-11-29 RX ADMIN — ONABOTULINUMTOXINA 155 UNITS: 200 INJECTION, POWDER, LYOPHILIZED, FOR SOLUTION INTRADERMAL; INTRAMUSCULAR at 11:35

## 2021-11-29 ASSESSMENT — FIBROSIS 4 INDEX: FIB4 SCORE: 1.19

## 2021-11-29 NOTE — PROGRESS NOTES
Botulinum Toxin Injection for Chronic Migraine    Last injection date: 11/2020  Response: Significant reduction in frequency of severe migraine  Complication: Possible mild eyelid droop  Subjective: 16+ migraine days per month, severe, 24 hours     Procedure: Botulinum toxin injection per PREEMPT protocol  Diagnosis: Chronic Migraine  Performed: [facility]  Performed by: Mila Moyer MD  Consent Obtained: Yes (including risks, benefits, and alternatives)  The risk include but not limited to neck pain, headache, migraine, slight or partial facial paralysis, eyelid drooping, distant side effects such as breathing problems.  Follow up/Discharge Instructions: Given     Description: The skin was exposed and prepped with alcohol. Injections were done using sterile techniques. Verbal consent was obtained. Immediately prior to procedure, a time out was called to verify the correct patient, procedure, equipment, support staff and site/side marked as required. Pre-procedure pain reported as 0/10 and post-procedure was 0/10.   Botox (lot #[id number], expiration date 10/20/2023) was mixed in presence of the patient with preservative free normal saline in standard dilution 10 units in 0.1 ml and injected as follows:  - Procerus 5 units  -  10 units (5 unit per site, 1 site each side)  - Frontalis 20 units (5 units per site, 2 sites each side)  - Temporalis 40 units (5 units per site, 4 sites each side)  - Occipitalis 30 units (5 units per site, 3 sites each side)  - Cervical paraspinals 20 units (5 units per site, 2 sites each side)  - Trapezius 30 units (5 units per site, 3 sites each side)     Total 155 units used and 45 units wasted.   Patient tolerated procedure well with minimal blood loss.     Mila Moyer MD  Neurology - Neurophysiology

## 2021-11-29 NOTE — PROGRESS NOTES
"Chief Complaint   Patient presents with   • Follow-Up     Chronic migraine     Patient is referred by Mila Moyer M.D.for initial consult.    History of present illness:  Roxanna Guzmán 36 y.o. female presents today for migraine.   History is obtained from patient.  and Patient is accompanied by self. She is a periodontist.    Duration/timing: onset age 15, 16+ migraines per month currently  Context: Migraine: aphasia followed by headache as documented below  Location: right frontotemporal or bilateral  Quality: stabbing  Severity: 7-10 intensity, missing work at times  Modifying factors: see med list below  Associated signs/symptoms: +photophobia/phonophobia  Denies: bladder incontinence, bowel incontinence, vision changes/loss or diplopia, head trauma , weakness, numbness/tingling, swallowing difficulties, speech disturbance, loss of consciousness, falls, family hx seizure and febrile seizure as child, N/V      Seizures: last seizure 5/2019   Per Dr. Leroy note: \"Roxanna has a history of seizures.  These are characterized by auditory hallucinations and aphasia.  Each episode lasts ~60 seconds at a time.  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  She saw a psychiatrist who diagnosed her with seizures.  EEG was reportedly normal but didn't capture a typical event. MRI brain in [place] also normal. She started lacosamide and hasn't had any events since.  She is prescribed lacosamide 200 mg BID, but she usually only takes this once daily after she missed doses and realized no breakthrough seizures. She thinks she was having them as a child.     Familial syndrome: None of the time to go into detail today.  She would like to discuss this on follow-up.  She has no symptoms of a syndrome in the family. 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  Per Dr. Leroy note: \"Family History:  Roxanna has a strong family history (father, paternal aunts, paternal grandmother, cousins [on father's side], " "brother, and niece [brother's daughter]) all suffer from a syndrome characterized by hyper-reflexia, clonus, gait abnormalities, and speech difficulties which arose in their early 20s and progressed to the extent that they required a wheel chair.  Workup for hereditary spastic paraparesis (genetic testing) was reportedly unremarkable.  Roxnana wonders if she might be a carrier for this condition (she has brisk reflexes), and she worries that she might pass this down to her son.\"     Patient has tried:  -Vimpat - started 2014 as BID, taking nightly since 5/2019   -Maxalt - 10mg PRN headache, fair abortive with likely room for improvement  -Emgality injections - no benefit  -Sumatriptan - no benefit, cardiovascular side effects  -Other triptans - no benefit  -Propranolol - no benefit   -Topiramate - side effects  -Botox - reduced frequency significantly, last injection 11/2020, no issues - eyelid droops sometimes, first injection 2016  -Birth control implant - progestrone       Of note patient is actively breast-feeding she is 2 months postpartum    Past medical history:   Past Medical History:   Diagnosis Date   • Anesthesia 02/10/2021    mother hx apnea   • Anxiety    • Depression    • Migraine    • Seizure disorder, complex partial (HCC) 02/10/2021    hx of (last 1/2021)   • TMJ arthropathy        Past surgical history:   Past Surgical History:   Procedure Laterality Date   • PB LAMINOTOMY,LUMBAR DISK,1 INTRSP Left 2/24/2021    Procedure: LAMINECTOMY, SPINE, LUMBAR, WITH [id number];  Surgeon: Indra Goode M.D.;  [facility]GERY [facility];  Service: Neurosurgery   • LUMBAR TRANSFORAMINAL EPIDURAL STEROID INJECTION Bilateral 2/1/2021    Procedure: INJECTION, STEROID, SPINE, LUMBAR, EPIDURAL, TRANSFORAMINAL APPROACH;  Surgeon: Aldair Segovia M.D.;  [facility];  Service: Pain Management   • BLEPHAROPLASTY Bilateral 2019   • LEEP  2003   • HERNIA REPAIR     • LIPOMA EXCISION   " "      Family history:   Family History   Problem Relation Age of Onset   • Hypertension Mother    • Cancer Father         prostate cancer   • Depression Father    • Genetic Disorder Father         Unknown Neuromusk disease   • Hypertension Maternal Grandmother    • Hyperlipidemia Maternal Grandmother    • Genetic Disorder Brother         Unknown Neuromusk disease   • Genetic Disorder Paternal Aunt         Unknown Neuromusk disease   • Genetic Disorder Paternal Grandmother         Unknown Neuromusk disease       Social history:   Tobacco Use   • Smoking status: Never Smoker   • Smokeless tobacco: Never Used   Vaping Use   • Vaping Use: Never used   Substance and Sexual Activity   • Alcohol use: Not Currently   • Drug use: Not Currently     Types: Oral     Comment: marijuana edibles     Current medications:   Current Outpatient Medications   Medication   • VIMPAT 200 MG Tab tablet   • ibuprofen (MOTRIN) 600 MG Tab   • ferrous sulfate 325 (65 Fe) MG tablet   • acetaminophen (TYLENOL) 325 MG Tab   • prenatal plus vitamin (STUARTNATAL 1+1) 27-1 MG Tab tablet   • rizatriptan (MAXALT-MLT) 10 MG disintegrating tablet     No current facility-administered medications for this visit.       Medication Allergy:  No Known Allergies    ROS -per HPI    Physical examination:   Vitals:    11/29/21 1036   BP: 124/80   BP Location: Right arm   Patient Position: Sitting   BP Cuff Size: Adult   Pulse: 87   Resp: 12   Temp: 37.1 °C (98.7 °F)   TempSrc: Temporal   SpO2: 100%   Weight: 74.4 kg (164 lb 0.4 oz)   Height: 1.753 m (5' 9\")     General: Patient in well nourished in no apparent distress.  HENT: Normocephalic, atraumatic.  Cardiovascular: No lower extremity edema.  Respiratory: Normal respiratory effort.   Psychiatric: Pleasant.     NEUROLOGICAL EXAM:   Mental status: Awake, alert and fully oriented to person, place, time and situation. Normal attention, concentration and fund of knowledge for education level.   Speech and " language: Speech is fluent without errors and clear.  Cranial nerve exam:  II: Pupils are equally round and reactive to light. Visual fields are intact by confrontation.  III, IV, VI: EOMI, no diplopia, no ptosis.  V: Sensation to light touch is normal over V1-3 distributions bilaterally.  .  VII: Facial movements are symmetrical. There is no facial droop. .  VIII: Hearing intact to soft speech and finger rub bilaterally  IX: Palate elevates symmetrically, uvula is midline. Dysarthria is not present.  XI: Shoulder shrug are symmetrical and strong.   XII: Tongue protrudes midline.    Motor exam:  Muscle tone is normal in all 4 limbs. and No abnormal movements appreciated.    Muscle strength:     Right  Left  Deltoid   5/5  5/5      Biceps   5/5  5/5  Triceps  5/5  5/5   Wrist extensors 5/5  5/5  Wrist flexors  5/5  5/5     5/5  5/5  Interossei  5/5  5/5  Thenar (APB)  NT/5  NT/5   Hip flexors  5/5  5/5  Quadriceps  5/5  5/5   Hamstrings  5/5  5/5  Dorsiflexors  5/5  5/5  Plantarflexors  5/5  5/5  Toe extension  NT/5  NT/5  NT = not tested    Sensory exam:  Intact to Light touch and Temperature in bilateral upper and lower extremity - except for mild numbness to LT on the left 3rd toe and sole.    Reflexes:       Right  Left  Biceps   1/4  1/4  Triceps  0/4  0/4  Brachioradialis 0/4  0/4  Knee jerk  2/4  2/4 - exaggerated?  Ankle jerk  2/4  0/4   bilateral toes are downgoing to plantar stimulation..    Coordination: shows a normal finger-nose-finger and heel to shin bilaterally.   Gait: Casual gait is normal.      ANCILLARY DATA REVIEWED:   Lab Data Review:  Lab Results   Component Value Date/Time    WBC 18.3 (H) 10/08/2021 05:19 AM    RBC 2.76 (L) 10/08/2021 05:19 AM    HEMOGLOBIN 8.5 (L) 10/08/2021 05:19 AM    HEMATOCRIT 24.7 (L) 10/08/2021 05:19 AM    MCV 89.5 10/08/2021 05:19 AM    MCH 30.8 10/08/2021 05:19 AM    MCHC 34.4 10/08/2021 05:19 AM    MPV 10.3 10/08/2021 05:19 AM    NEUTSPOLYS 82.50 (H) 10/07/2021  08:20 AM    LYMPHOCYTES 12.00 (L) 10/07/2021 08:20 AM    MONOCYTES 3.90 10/07/2021 08:20 AM    EOSINOPHILS 0.60 10/07/2021 08:20 AM    BASOPHILS 0.50 10/07/2021 08:20 AM      Lab Results   Component Value Date/Time    SODIUM 134 (L) 10/07/2021 08:20 AM    POTASSIUM 4.1 10/07/2021 08:20 AM    CHLORIDE 102 10/07/2021 08:20 AM    CO2 20 10/07/2021 08:20 AM    GLUCOSE 106 (H) 10/07/2021 08:20 AM    BUN 10 10/07/2021 08:20 AM    CREATININE 0.53 10/07/2021 08:20 AM     Lab Results   Component Value Date/Time    ASTSGOT 27 10/07/2021 0820    ALTSGPT 19 10/07/2021 0820    ALKPHOSPHAT 177 (H) 10/07/2021 0820    ALBUMIN 3.8 10/07/2021 0820     Lab Results   Component Value Date/Time    HBA1C 5.2 01/13/2020 09:01 AM        Imaging:   MRI L-spine without contrast February 2021:Interval enlargement of left paracentral L5/S1 extruded disc which causes severe lateral recess narrowing, mass effect upon the left sacral nerve roots. No other change.  Ambiguous segmentation.    Records reviewed: Neurology note reviewed dated 6/20/2021.      ASSESSMENT AND PLAN:    1. Chronic migraine w/o aura w/o status migrainosus, not intractable: Patient with a chronic history of migraine failed multiple first-line therapies.  Currently not at goal.  She responded well to Botox however stopped due to pregnancy.  She is currently postpartum about 2 months and is actively breast-feeding.  We had an extensive discussion with regards to the risk and benefits of Botox injections in the setting of breast-feeding.  Unfortunately we do not have enough data, Botox can be present in breastmilk but this is uncertain.  We discussed red flag symptoms to monitor for which may indicate transfer to her baby and if present to seek urgent medical care.  Currently given the severe nature of her migraines affecting her quality of life and career, we agreed to proceed with Botox injections for further management.  I do not agree with a CGRP antagonists at this point in  time while breast-feeding.  - onabotulinum toxin A (Botox) injection 155 Units (see procedure note today)  -Continue Maxalt 10 mg as needed headache, consider combining with NSAID for abortive therapy  -When she is no longer breast-feeding we can consider Nurtec versus Ubrelvy    2. Seizure disorder (HCC): Briefly addressed today.  Apparently controlled with Vimpat.  Will have a longer follow-up to reevaluate.  I think it may be helpful to have a repeat EEG on this lower dose -we will discuss on follow-up.  - VIMPAT 200 MG Tab tablet; Take 1 Tablet by mouth every day for 180 days.  Dispense: 30 Tablet; Refill: 5  -Consider repeat EEG    3. Lumbar radiculopathy: History of with chronic left foot numbness.  Status post spine intervention.  Monitor clinically.    4. Family history of spasticity: Per HPI.  Patient would like discussed this in further detail on follow-up.    FOLLOW-UP: Return in about 3 months (around 2/28/2022).  Extended visit to evaluate for her seizure disorder and spasticity concerns  EDUCATION AND COUNSELING:  -I personally discussed the following with the patient:   Diagnosis, prognosis, and treatment options discussed with patient at length.   and Risks/benefits/side effects/alternatives to treatment of chronic intractable migraine including but not limited to side effects to Botox (bruising and injection site reactions, facial asymmetry, trouble swallowing, head drop, systemic side effects such as shortness of breath which can be life-threatening)    This dictation was created using voice recognition software. I have made every reasonable attempt to avoid dictation errors, but this document may contain an error not identified before finalizing. If the error changes the accuracy of the document, I would appreciate it being brought to my attention. Thank you.    Mila Moyer MD  Neurology

## 2021-11-30 DIAGNOSIS — G43.701 CHRONIC MIGRAINE WITHOUT AURA WITH STATUS MIGRAINOSUS, NOT INTRACTABLE: ICD-10-CM

## 2021-11-30 RX ORDER — RIZATRIPTAN BENZOATE 10 MG/1
TABLET, ORALLY DISINTEGRATING ORAL
Qty: 9 TABLET | Refills: 5 | Status: SHIPPED | OUTPATIENT
Start: 2021-11-30 | End: 2022-02-28 | Stop reason: SDUPTHER

## 2022-01-10 ENCOUNTER — OFFICE VISIT (OUTPATIENT)
Dept: OBGYN | Facility: CLINIC | Age: 37
End: 2022-01-10
Payer: COMMERCIAL

## 2022-01-10 DIAGNOSIS — O92.29 SORE NIPPLES DUE TO LACTATION: ICD-10-CM

## 2022-01-10 PROCEDURE — 99215 OFFICE O/P EST HI 40 MIN: CPT | Performed by: NURSE PRACTITIONER

## 2022-01-10 NOTE — PROGRESS NOTES
Summary: Exclusive breastmilk, back to work, pumps 6a, 8a, 1p, nursing 6p, 7p, 8p, cluster in the evening. Sleeps long hours appropriately. Gain just under and ounce per day but length increasedaccounting for SOME of the weight.Length for weight suggests more milk per 24 hours is warranted.  Seeing Ped PT for high infant compression. Moms hands with neuropathy, numbness, delayed response to  injection for  relief, will have surgery eventually.   Today Independently latched and baby removed 4.4oz from the right and additional 2.4oz from the left - total 6.8oz. Nipples no longer pink after feeding, purplish coloring - vasospasm. Pumped with 22mm flange, initial less pain and then felt similar to the 24mm, although suction higher than she usually uses.   Plan Continue with pumping and breastfeeding. Trying to find ways to address the pain which primarily is coming from compression both with the baby and pumping. There has been some improvement in the past week - 10 days.  Recommending vasospasm tx plan with Vit b6 and L-arginine.   Follow up Ped appt 4 month WC, Lactation to follow up from vasospasm tx. Following up with ped  PT Johanna Guerrero      Subjective:   Roxanna Guzmán is a 36 y.o. female here for lactation care. She is here today with baby.    Concerns:   Nipple pain with feeding and pumping.    HPI:   Mother does not have a history of GDM, hypertension prior to pregnancy, insulin resistance, multiple gestation, PCOS and thyroid disease. These are common conditions which may interfere with milk supply.     Mother does have advanced maternal age. This is a condition these are conditions that may interfere in milk supply.  Other risk factors Hx of anxiety    FEEDING HISTORY:    Past breastfeeding history:  First baby   Hospital course: Exclusive breastfeeding  Prior to consultation on 10/14/2021  Sore on the left nipple. Exclusive breastmilk and at the breast.  Prior to consultation on 11/1/2021  Exclusive breastmilk, mostly breastfeeding, occasional bottle. Pumping 2x/day to save milk. Slow gain over the the 2-3 week. Consistently offered both sides and weight gain is within normal limits now. Left nipple persistently sore, bleb remains but that is not the source of the pain.   Prior to consultation on  11/18/2021 Exclusive breastmilk, mostly breastfeeding, occasional bottle. Pumping 2x/day to save milk. Gain just under and ounce per day but length increased significantly accounting for weight. Mom having difficulty with baby staying latched and the on/off behavior is causing pain. New bleb on the right side, left side healed.  More difficulty in the afternoon (less supply). Oral exam consistent with high compression and tongue not 100% effective, referred to Ped PT for evaluation.  Currently 1/10/2022  Exclusive breastmilk, back to work, pumps 6a, 8a, 1p, nursing 6p, 7p, 8p, cluster in the evening. Gain just under and ounce per day but length increasedaccounting for SOME of the weight.Length for weight suggests more milk per 24 hours is warranted.  Seeing Ped PT for high infant compression.    Both breasts: Yes offers both  Bottle feeds: 4x/24h while at work    Supplement:   Expressed milk when using a bottle    Nipple Shield Use: None did not transfer well when used in November    Breast Pumping:   Pumps with Spectra at  6a, 8a, 1p. Initially 12oz, then 4 and 4 about - about 22-23oz per day    Maternal ROS:  Constitutional: No fever, chills. Feeling well  Breasts: No soreness of breasts and Soreness of nipples both sides, feeling overall better but persistent  Psychiatric: Managing ok  Mental Health: No mention of feeling irritable, agitated, angry, overwhelmed, apathy, exhaustion nor having sleep changes outside infant feeds/demands or appetite changes     Objective:     Maternal Physical   General: No acute distress  Breasts: Symetrical , Full, Plugged Duct - no evidence and Mastitis  - no S/S  Nipples:  intact,   Psychiatric:  Normal mood and affect. Her behavior is normal. Judgment and thought content normal   Mental Health:  Did NOT exhibit sadness, crying, feeling overwhelmed, agitation or hypervigilance.     Assessment/Plan & Lactation Counseling:     Infant exam on infant chart    Infant Weight History:   10/7/21 8#2.9oz  10/14/2021     8#7.8oz  11/1/2021     9#3.8oz  11/18/2021  10#5.6oz  59cm  12/8/21  11#4oz  1/10/2022 12#7.6oz  64cm      Infant intake at Breast:: Left  2.4ozoz  Right 4.4oz    Total: 6.8oz  Milk Transfer at this feeding:   Effective breastfeeding when hungry and bare breast.   Initiation of Feeding: Infant initiates  Position of Feeding:    Right: cross cradle  Left: cross cradle  Attachment Achieved: rapidly  Nipple shield: Attempted with minimal milk removal      Suck Pattern at the breast: Suck burst and normal rest  Behavior Following Observed Feeding: content  Nipple Pain from: compression resulting in vasospasm   White tongue, 2 tone lips suggesting an inconsistent movement of the tongue - improving    Latch: Mom latches independently  Suckling/Feeding: attaches, baby fed effectively, baby roots, elicits INEZ and rhythmic  Milk Supply Available: normal to abundant,     Maternal Diagnosis/Problem:  Sore nipples due to lactation       BREASTFEEDING PLAN  Discussed concerns and symptoms as listed above in assessment and guidance summarized below.  Topics reviewed included:    •  Maternal Mental Health: Has professional support. Discussed and SSRI for nipple pain but mom's experience with SSRIs prevents this approach.     •  Feeding: Weight trending down, although length increasing w/l  Z= -2.73oz  - Increase intake by 2oz per 24 hours  - Baby manages 6-7oz per feeding with no difficulty.    •  Supplement:   - No supplement is needed, ample breastmilk.  - Increasing 24 hour  intake     This is not a latch or positioning issue on the moms part.    •  Pumping Guidelines:  • Pumping with the  spectra, L3 to L7max - no change  • Jeovany flowers competitor of [place] - no guarantee, just a strategy for the left side when you have to go longer  • Trialed the 22mm and initially less pain. Probably  not be the answer/solution.  • Nipple care:  • May apply breastmilk  • Moist-oily ointment after feeding/pumping, ie Lanolin nipple butter, coconut or olive oil, if desired/needed 2-3 times/day until nipples are healed  • You do not need to wash this off before pumping or feeding the baby   • Bleb has triamcinolone cream if desired for the bleb, but the bleb does not seem to be the point of pain.   Vasospasm These conditions are due to a spasm of blood vessels preventing blood from getting to a particular area of the body, typically the end of an extremity, though not necessarily. They often occur in response to a drop in temperature. Vasospasm can also occur in nipples. In fact, it is much more common than generally believed. It can occur along with any cause of sore nipples, and is, in fact, probably a result of damage, but it may also, on occasion, occur without any other kind of nipple pain at all. That the nipple changes color is not the concern; that the mother is in pain is a concern.The treatment for vasospasm is to fix the original cause of the pain.  Almost always, as the nipple soreness from another cause is getting better, so will the pain from the vasospasm, but more slowly. Fixing the original cause of the pain (improving the latch, etc) should be the focus of treatment.  Treatments   *No Air Drying of the nipples. When baby comes off the breast, immediately cover the nipple with your warm hand while you get your bra done up. After talking a shower, avoid going out of the shower enclosure until the breasts are completely covered and kept warmed so the cold air cannot reach the nipples.  *Olive Oil Warming olive oil in mother’s fingers and then gently massaging the oil into the nipples during the burning  may be very soothing. We have heard from many mothers that this gave them instant relief and seemed to decrease the occurrence of the vasospasm overall. It’s important that the oil be really massaged into the nipples and not just dabbed on  *Vitamin B6 Multi Complex. There have not yet been studies done to show that vitamin B6 works, but enough anecdotal evidence has come forward to support that it does work at least some of the time. It is safe and will do no harm. It is best that B6 not be taken on its own but instead as part of a B complex of vitamins that includes niacin. Depending on the overall dose of the B complex, the amount of B6 itself should be approximately 100 mg 2x/day for at least a couple of weeks. So, for example, if the overall capsule is 125 mg of B complex and there is only 50 mg of B6 in that capsule, then mother would need to take 2 capsules at a time to equal one dose and that dose would need to be taken 2x/day. The mother continues it until she is pain free for a few weeks. It can be restarted if necessary. If you have been pain free for a week or two, try going off the vitamin B6. If vitamin B6 does not work within a week, it probably won’t.  *L-arginine is converted in the body into a chemical called nitric oxide. Nitric oxide causes blood vessels to open wider for improved blood flow. L-arginine also stimulates the release of growth hormone, insulin, and other substances in the body.   L-arginine 500mg three times a day. Start with 500mg and build to 1500mg. Can give a slight headache when you begin.  This was shared by an IBCLC who specializes in complimentary medicine.  It has been vetted through a  naturopath and several physicians.  It is an alternative to nifedipine especially if there is pain while you are taking care of the cause of the pain.   *Warm dry compresses can be very effective at stopping the vasospasm as it is occurring and for treating the pain.   When this is not  enough:  Nifedipine. This is a drug used for hypertension. One 30 mg tablet of the slow release formulation once a day often takes away the pain of vasospasm. After two weeks, stop the medication. If pain returns (about 10% of mothers), start it again. After two weeks, stop the medication. If pain returns (a very small number of mothers), start it again. No mothers I am aware of took more than three, two week courses. Side effects are uncommon, but headache may occur. It is a prescription drug. The dose can be increased if 1 tablet is insufficient. The nifedipine treatment may be used in conjunction with all of the other treatments listed above.       Follow up  Mom is encouraged to e-mail to update how the plan is working.    Pediatrician appointment: 4 month [facility]    Follow-up for infant weight check and dyad breastfeeding evaluation as needed but follow up to your concerns is important. Please call [phone] if you have not scheduled your next appointment    A total of 55  minutes, not including infant assessment time, with more than 50% was spent preparing to see the patient, obtaining and reviewing separately obtained history, performing a medically appropriate examination and evaluation, counseling and educating the family, ordering medications, test or procedures, referral, documenting clinical information in the electronic health record, independently interpreting weighted feeds and infant growth results, communicating these results to the family and care coordination as detailed in the above note.       FRED Hernandez.

## 2022-01-24 PROBLEM — G56.03 BILATERAL CARPAL TUNNEL SYNDROME: Status: ACTIVE | Noted: 2022-01-24

## 2022-02-28 ENCOUNTER — OFFICE VISIT (OUTPATIENT)
Dept: NEUROLOGY | Facility: MEDICAL CENTER | Age: 37
End: 2022-02-28
Attending: PSYCHIATRY & NEUROLOGY
Payer: COMMERCIAL

## 2022-02-28 VITALS
BODY MASS INDEX: 23.58 KG/M2 | HEIGHT: 69 IN | OXYGEN SATURATION: 96 % | WEIGHT: 159.17 LBS | TEMPERATURE: 98.4 F | SYSTOLIC BLOOD PRESSURE: 112 MMHG | HEART RATE: 86 BPM | DIASTOLIC BLOOD PRESSURE: 76 MMHG

## 2022-02-28 DIAGNOSIS — G43.701 CHRONIC MIGRAINE WITHOUT AURA WITH STATUS MIGRAINOSUS, NOT INTRACTABLE: ICD-10-CM

## 2022-02-28 PROCEDURE — 700111 HCHG RX REV CODE 636 W/ 250 OVERRIDE (IP): Mod: JW | Performed by: PSYCHIATRY & NEUROLOGY

## 2022-02-28 PROCEDURE — 64615 CHEMODENERV MUSC MIGRAINE: CPT | Performed by: PSYCHIATRY & NEUROLOGY

## 2022-02-28 RX ORDER — IBUPROFEN 800 MG/1
800 TABLET ORAL EVERY 8 HOURS PRN
COMMUNITY

## 2022-02-28 RX ORDER — RIZATRIPTAN BENZOATE 10 MG/1
TABLET, ORALLY DISINTEGRATING ORAL
Qty: 14 TABLET | Refills: 5 | Status: SHIPPED | OUTPATIENT
Start: 2022-02-28 | End: 2022-12-07

## 2022-02-28 RX ADMIN — ONABOTULINUMTOXINA 155 UNITS: 200 INJECTION, POWDER, LYOPHILIZED, FOR SOLUTION INTRADERMAL; INTRAMUSCULAR at 15:57

## 2022-02-28 ASSESSMENT — FIBROSIS 4 INDEX: FIB4 SCORE: 1.19

## 2022-02-28 ASSESSMENT — PATIENT HEALTH QUESTIONNAIRE - PHQ9: CLINICAL INTERPRETATION OF PHQ2 SCORE: 0

## 2022-02-28 NOTE — PROGRESS NOTES
Botulinum Toxin Injection for Chronic Migraine    Last injection date: 11/29/2021  Response: Significant reduction in frequency of severe migraine, she still gets frequent headaches though responds to PRN meds.  Complication: None  Subjective: 16+ migraine days per month, severe, 24 hours     Procedure: Botulinum toxin injection per PREEMPT protocol  Diagnosis: Chronic Migraine  Performed: [facility]  Performed by: Mila Moyer MD  Consent Obtained: Yes (including risks, benefits, and alternatives)  The risk include but not limited to neck pain, headache, migraine, diplopia, slight or partial facial paralysis, eyelid drooping, distant side effects such as breathing problems.  Follow up/Discharge Instructions: Given     Description: The skin was exposed and prepped with alcohol. Verbal/written consent was obtained. Immediately prior to procedure, a time out was called to verify the correct patient, procedure, equipment, support staff and site/side marked as required. Pre-procedure pain reported as 0/10 and post-procedure was 0/10.   Botox (lot #[id number], expiration date 4/2024) was mixed in presence of the patient with preservative free normal saline in standard dilution 10 units in 0.1 ml and injected as follows:  - Procerus 5 units  -  10 units (5 unit per site, 1 site each side)  - Frontalis 20 units (5 units per site, 2 sites each side)  - Temporalis 40 units (5 units per site, 4 sites each side)  - Occipitalis 30 units (5 units per site, 3 sites each side)  - Cervical paraspinals 20 units (5 units per site, 2 sites each side)  - Trapezius 30 units (5 units per site, 3 sites each side)     Total 155 units used and 45 units wasted.   Patient tolerated procedure well with minimal blood loss.   F/u 12 weeks.    Mila Moyer MD  Neurology - Neurophysiology

## 2022-03-01 NOTE — PROGRESS NOTES
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  Patient has brisk patellar reflexes today similar to prior exam but no other clinical symptoms otherwise -exam is otherwise stable compared to prior.  We discussed the diagnostic yield of testing without symptoms.  Given dad has symptoms a thorough evaluation would likely be more revealing.  I will follow her clinically for any symptoms that may suggest she has similar condition.

## 2022-03-15 PROBLEM — Z47.89 ORTHOPEDIC AFTERCARE: Status: ACTIVE | Noted: 2022-03-15

## 2022-03-29 NOTE — ASSESSMENT & PLAN NOTE
----- Message from Mikaela De La Paz sent at 3/28/2022  4:00 PM CDT -----  Regarding: MRI Brain and MRI Brachial Plexus Right  Hello,    We have made two attempts to schedule the patient for the MRI Brain and MRI Brachial Plexus Right that you ordered.    At this time we will be contacting the patient only once more to schedule over the phone. We will keep the order on file if you wish to have the patient call us to schedule. Our phone number is [phone].  We will also send the patient a post card with our phone number and hours if they decide to schedule they can call us at their convenience. If you feel the patient no longer needs this order, please advise the patient and cancel the order.      Thank you,  Mikaela De La Paz  3/28/2022     -Likely due to pain.  Euvolemic.  -Monitor.

## 2022-06-01 ENCOUNTER — TELEPHONE (OUTPATIENT)
Dept: NEUROLOGY | Facility: MEDICAL CENTER | Age: 37
End: 2022-06-01
Payer: COMMERCIAL

## 2022-06-01 NOTE — TELEPHONE ENCOUNTER
Contacted patient at [phone] to discuss [facility] and services/benefits offered. No answer, left voicemail.    Rashmi Toussaint  Rx Coordinator   [phone]

## 2022-06-27 ENCOUNTER — TELEPHONE (OUTPATIENT)
Dept: NEUROLOGY | Facility: MEDICAL CENTER | Age: 37
End: 2022-06-27

## 2022-06-27 ENCOUNTER — OFFICE VISIT (OUTPATIENT)
Dept: NEUROLOGY | Facility: MEDICAL CENTER | Age: 37
End: 2022-06-27
Attending: PSYCHIATRY & NEUROLOGY
Payer: COMMERCIAL

## 2022-06-27 VITALS
HEIGHT: 69 IN | TEMPERATURE: 98.5 F | HEART RATE: 104 BPM | BODY MASS INDEX: 24.62 KG/M2 | SYSTOLIC BLOOD PRESSURE: 118 MMHG | RESPIRATION RATE: 12 BRPM | DIASTOLIC BLOOD PRESSURE: 72 MMHG | WEIGHT: 166.2 LBS | OXYGEN SATURATION: 98 %

## 2022-06-27 DIAGNOSIS — G43.709 CHRONIC MIGRAINE W/O AURA W/O STATUS MIGRAINOSUS, NOT INTRACTABLE: ICD-10-CM

## 2022-06-27 DIAGNOSIS — G40.802 OTHER EPILEPSY WITHOUT STATUS EPILEPTICUS, NOT INTRACTABLE (HCC): ICD-10-CM

## 2022-06-27 PROCEDURE — 64615 CHEMODENERV MUSC MIGRAINE: CPT | Performed by: PSYCHIATRY & NEUROLOGY

## 2022-06-27 PROCEDURE — 99214 OFFICE O/P EST MOD 30 MIN: CPT | Mod: 25 | Performed by: PSYCHIATRY & NEUROLOGY

## 2022-06-27 PROCEDURE — 700111 HCHG RX REV CODE 636 W/ 250 OVERRIDE (IP): Performed by: PSYCHIATRY & NEUROLOGY

## 2022-06-27 RX ORDER — LACOSAMIDE 100 MG/1
100 TABLET ORAL 2 TIMES DAILY
Qty: 60 TABLET | Refills: 3 | Status: SHIPPED | OUTPATIENT
Start: 2022-06-27 | End: 2022-10-25

## 2022-06-27 RX ADMIN — ONABOTULINUMTOXINA 155 UNITS: 200 INJECTION, POWDER, LYOPHILIZED, FOR SOLUTION INTRADERMAL; INTRAMUSCULAR at 16:21

## 2022-06-27 ASSESSMENT — FIBROSIS 4 INDEX: FIB4 SCORE: 1.19

## 2022-06-27 NOTE — PROGRESS NOTES
"Chief Complaint   Patient presents with   • Follow-Up     Botox     History of present illness:  Roxanna Guzmán 36 y.o. female presents today for migraine & seizure f/u.   History is obtained from patient.  and Patient is accompanied by self. She is a periodontist.    Duration/timing: onset age 15, 16+ migraines per month currently  Context: Migraine: aphasia followed by headache as documented below  Location: right frontotemporal or bilateral  Quality: stabbing  Severity: 7-10 intensity, missing work at times  Modifying factors: see med list below  Associated signs/symptoms: +photophobia/phonophobia  Denies: bladder incontinence, bowel incontinence, vision changes/loss or diplopia, head trauma , weakness, numbness/tingling, swallowing difficulties, speech disturbance, loss of consciousness, falls, family hx seizure and febrile seizure as child, N/V      Seizures: last seizure 5/2019   Per Dr. Leroy note: \"Roxanna has a history of seizures.  These are characterized by auditory hallucinations and aphasia.  Each episode lasts ~60 seconds at a time.  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  She saw a psychiatrist who diagnosed her with seizures.  EEG was reportedly normal but didn't capture a typical event. MRI brain in [place] also normal. She started lacosamide and hasn't had any events since.  She is prescribed lacosamide 200 mg BID, but she usually only takes this once daily after she missed doses and realized no breakthrough seizures. She thinks she was having them as a child.     Familial syndrome: None of the time to go into detail today.  She would like to discuss this on follow-up.  She has no symptoms of a syndrome in the family. 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  Per Dr. Leroy note: \"Family History:  Roxanna has a strong family history (father, paternal aunts, paternal grandmother, cousins [on father's side], brother, and niece [brother's daughter]) all suffer from a syndrome " "characterized by hyper-reflexia, clonus, gait abnormalities, and speech difficulties which arose in their early 20s and progressed to the extent that they required a wheel chair.  Workup for hereditary spastic paraparesis (genetic testing) was reportedly unremarkable.  Roxanna wonders if she might be a carrier for this condition (she has brisk reflexes), and she worries that she might pass this down to her son.\"     Patient has tried:  -Vimpat - started 2014 as BID, taking nightly since 5/2019   -Maxalt - 10mg PRN headache, fair abortive with likely room for improvement  -Emgality injections - no benefit  -Sumatriptan - no benefit, cardiovascular side effects  -Other triptans - no benefit  -Propranolol - no benefit   -Topiramate - side effects  -Botox - reduced frequency significantly, last injection 11/2020, no issues - eyelid droops sometimes, first injection 2016  -Birth control implant - progestrone     Subjective: Patient was last seen in neurology clinic on 2/2022.     Migraines: She is looking for a change in abortive medication.  She has bad after effects of taking the Maxalt.  She is no longer breast-feeding.    Epilepsy: History of epilepsy.  Last seizure was prior to pregnancy in 2020.  She previously saw a EDGARDO Tierney.  She was compliant with her Vimpat total daily dose of 200 mg until recently.  She has not had any Vimpat for 1 month and has felt fine.  She is working, driving, caring for her son.      Past medical history:   Past Medical History:   Diagnosis Date   • Anesthesia 02/10/2021    mother hx apnea   • Anxiety    • Depression    • Migraine    • Seizure disorder, complex partial (HCC) 02/10/2021    hx of (last 1/2021)   • TMJ arthropathy        Past surgical history:   Past Surgical History:   Procedure Laterality Date   • PB REVISE MEDIAN N/CARPAL TUNNEL SURG Bilateral 3/15/2022    Procedure: LEFT OPEN CARPAL TUNNEL RELEASE, RIGHT OPEN CARPAL TUNNEL RELEASE;  Surgeon: Marlene Odom, " M.D.;  Location: [facility];  Service: Orthopedics   • PB LAMINOTOMY,LUMBAR DISK,1 INTRSP Left 2/24/2021    Procedure: LAMINECTOMY, SPINE, LUMBAR, WITH [id number];  Surgeon: Indra Goode M.D.;  Location: [facility];  Service: Neurosurgery   • LUMBAR TRANSFORAMINAL EPIDURAL STEROID INJECTION Bilateral 2/1/2021    Procedure: INJECTION, STEROID, SPINE, LUMBAR, EPIDURAL, TRANSFORAMINAL APPROACH;  Surgeon: Aldair Segovia M.D.;  [facility];  Service: Pain Management   • BLEPHAROPLASTY Bilateral 2019   • LEEP  2003   • HERNIA REPAIR     • LIPOMA EXCISION         Family history:   Family History   Problem Relation Age of Onset   • Hypertension Mother    • Cancer Father         prostate cancer   • Depression Father    • Genetic Disorder Father         Unknown Neuromusk disease   • Hypertension Maternal Grandmother    • Hyperlipidemia Maternal Grandmother    • Genetic Disorder Brother         Unknown Neuromusk disease   • Genetic Disorder Paternal Aunt         Unknown Neuromusk disease   • Genetic Disorder Paternal Grandmother         Unknown Neuromusk disease       Social history:   Tobacco Use   • Smoking status: Never Smoker   • Smokeless tobacco: Never Used   Vaping Use   • Vaping Use: Never used   Substance and Sexual Activity   • Alcohol use: Not Currently   • Drug use: Not Currently     Types: Oral     Comment: marijuana edibles     Current medications:   Current Outpatient Medications   Medication   • lacosamide (VIMPAT) 100 MG Tab tablet   • ibuprofen (MOTRIN) 800 MG Tab   • rizatriptan (MAXALT-MLT) 10 MG disintegrating tablet     Current Facility-Administered Medications   Medication Dose   • onabotulinum toxin A (Botox) injection 155 Units  155 Units       Medication Allergy:  No Known Allergies    ROS -per HPI    Physical examination:   Vitals:    06/27/22 1521   BP: 118/72   BP Location: Left arm   Patient Position: Sitting   BP Cuff Size:  "Adult   Pulse: (!) 104   Resp: 12   Temp: 36.9 °C (98.5 °F)   TempSrc: Temporal   SpO2: 98%   Weight: 75.4 kg (166 lb 3.2 oz)   Height: 1.753 m (5' 9\")     General: Patient in well nourished in no apparent distress.  HENT: Normocephalic, atraumatic.  Cardiovascular: No lower extremity edema.  Respiratory: Normal respiratory effort.   Psychiatric: Pleasant.     NEUROLOGICAL EXAM:   Mental status: Awake, alert and fully oriented to person, place, time and situation. Normal attention, concentration and fund of knowledge for education level.   Speech and language: Speech is fluent without errors and clear.  Cranial nerve exam: Prior  II: Pupils are equally round and reactive to light. Visual fields are intact by confrontation.  III, IV, VI: EOMI, no diplopia, no ptosis.  V: Sensation to light touch is normal over V1-3 distributions bilaterally.  .  VII: Facial movements are symmetrical. There is no facial droop. .  VIII: Hearing intact to soft speech and finger rub bilaterally  IX: Palate elevates symmetrically, uvula is midline. Dysarthria is not present.  XI: Shoulder shrug are symmetrical and strong.   XII: Tongue protrudes midline.    Motor exam: Prior  Muscle tone is normal in all 4 limbs. and No abnormal movements appreciated.    Muscle strength:     Right  Left  Deltoid   5/5  5/5      Biceps   5/5  5/5  Triceps  5/5  5/5   Wrist extensors 5/5  5/5  Wrist flexors  5/5  5/5     5/5  5/5  Interossei  5/5  5/5  Thenar (APB)  NT/5  NT/5   Hip flexors  5/5  5/5  Quadriceps  5/5  5/5   Hamstrings  5/5  5/5  Dorsiflexors  5/5  5/5  Plantarflexors  5/5  5/5  Toe extension  NT/5  NT/5  NT = not tested    Sensory exam: Prior  Intact to Light touch and Temperature in bilateral upper and lower extremity - except for mild numbness to LT on the left 3rd toe and sole.    Reflexes:  Prior     Right  Left  Biceps   1/4  1/4  Triceps  0/4  0/4  Brachioradialis 0/4  0/4  Knee jerk  2/4  2/4 - exaggerated?  Ankle " jerk  2/4  0/4   bilateral toes are downgoing to plantar stimulation..    Coordination: shows a normal finger-nose-finger and heel to shin bilaterally. Prior  Gait: Casual gait is normal. Prior      ANCILLARY DATA REVIEWED:   Lab Data Review:  Lab Results   Component Value Date/Time    WBC 18.3 (H) 10/08/2021 05:19 AM    RBC 2.76 (L) 10/08/2021 05:19 AM    HEMOGLOBIN 8.5 (L) 10/08/2021 05:19 AM    HEMATOCRIT 24.7 (L) 10/08/2021 05:19 AM    MCV 89.5 10/08/2021 05:19 AM    MCH 30.8 10/08/2021 05:19 AM    MCHC 34.4 10/08/2021 05:19 AM    MPV 10.3 10/08/2021 05:19 AM    NEUTSPOLYS 82.50 (H) 10/07/2021 08:20 AM    LYMPHOCYTES 12.00 (L) 10/07/2021 08:20 AM    MONOCYTES 3.90 10/07/2021 08:20 AM    EOSINOPHILS 0.60 10/07/2021 08:20 AM    BASOPHILS 0.50 10/07/2021 08:20 AM      Lab Results   Component Value Date/Time    SODIUM 134 (L) 10/07/2021 08:20 AM    POTASSIUM 4.1 10/07/2021 08:20 AM    CHLORIDE 102 10/07/2021 08:20 AM    CO2 20 10/07/2021 08:20 AM    GLUCOSE 106 (H) 10/07/2021 08:20 AM    BUN 10 10/07/2021 08:20 AM    CREATININE 0.53 10/07/2021 08:20 AM     Lab Results   Component Value Date/Time    ASTSGOT 27 10/07/2021 0820    ALTSGPT 19 10/07/2021 0820    ALKPHOSPHAT 177 (H) 10/07/2021 0820    ALBUMIN 3.8 10/07/2021 0820     Lab Results   Component Value Date/Time    HBA1C 5.2 01/13/2020 09:01 AM        Imaging: None    Records reviewed: None      ASSESSMENT AND PLAN:    1. Chronic migraine w/o aura w/o status migrainosus, not intractable: Patient with a chronic history of migraine failed multiple first-line therapies.   -Onabotulinum toxin A (Botox) injection 155 Units (see procedure note today)   -Trial of Ubrelvy 100mg tablet, one tablet PRN migraine, ok to repeat in 2 hours if no benefit - samples provided    2. Seizure disorder (HCC): Previously controlled with Vimpat.  Recently noncompliant.  Risks and benefits discussion as below.  -Restart VIMPAT 100 MG Tab tablet; Take 1 Tablet by mouth BID   -Discussed  risk and benefits of weaning off seizure medications.  Ideally she would need a repeat EEG.  I think she is high risk based on her clinical history of having recurrent spells.  After discussing the risk and benefit she is agreeable to restart her Vimpat.    3. Family history of spasticity, not addressed today: Per HPI.  Patient would like discussed this in further detail on follow-up.    FOLLOW-UP: Return in about 3 months (around 9/27/2022).  Extended visit to evaluate for her seizure disorder   EDUCATION AND COUNSELING:  -I personally discussed the following with the patient:   Risks/benefits/side effects/alternatives of medication including but not limited to drowsiness, sedation, dizziness, increased risk for falls, cardiovascular effects (hypotension, cardiac arrhythmias, death), weight changes, increased risk for depression, anxiety, suicide, psychosis and mood changes and avoid abrupt cessation of medication. and [facility] motor vehicle saftey laws and mandatory reporting.    This dictation was created using voice recognition software. I have made every reasonable attempt to avoid dictation errors, but this document may contain an error not identified before finalizing. If the error changes the accuracy of the document, I would appreciate it being brought to my attention. Thank you.    Mila Moyer MD  Neurology

## 2022-06-27 NOTE — TELEPHONE ENCOUNTER
Lacosamide (Vimpat) 100mg Tablet    Request rec'd via [facility], RTPEGGY Zapata paid copay $60.00 #180/90 DS or $20.00 #60/30 DS no $$ benefit as $20.00 per 30 DS, notifying liaison Kim Joseph. - 06/27/2022 4:15pm

## 2022-06-27 NOTE — PROGRESS NOTES
Botulinum Toxin Injection for Chronic Migraine    Last injection date: 2/28/2022  Response: Significant reduction in frequency of severe migraine, she still gets frequent headaches though responds to PRN meds. Stable.  Complication: None  Subjective: 16+ migraine days per month, severe, 24 hours     Procedure: Botulinum toxin injection per PREEMPT protocol  Diagnosis: Chronic Migraine  Performed: [facility]  Performed by: Mila Moyer MD  Consent Obtained: Yes (including risks, benefits, and alternatives)  The risk include but not limited to neck pain, headache, migraine, diplopia, slight or partial facial paralysis, eyelid drooping, distant side effects such as breathing problems.  Follow up/Discharge Instructions: Given     Description: The skin was exposed and prepped with alcohol. Verbal/written consent was obtained. Immediately prior to procedure, a time out was called to verify the correct patient, procedure, equipment, support staff and site/side marked as required. Pre-procedure pain reported as 0/10 and post-procedure was 0/10.   Botox (lot #[id number], expiration date 12/2024) was mixed in presence of the patient with preservative free normal saline in standard dilution 10 units in 0.1 ml and injected as follows:  - Procerus 5 units  -  10 units (5 unit per site, 1 site each side)  - Frontalis 20 units (5 units per site, 2 sites each side)  - Temporalis 40 units (5 units per site, 4 sites each side)  - Occipitalis 30 units (5 units per site, 3 sites each side)  - Cervical paraspinals 20 units (5 units per site, 2 sites each side)  - Trapezius 30 units (5 units per site, 3 sites each side)     Total 155 units used and 45 units wasted.   Patient tolerated procedure well with minimal blood loss.   F/u 12 weeks.    Mila Moyer MD  Neurology - Neurophysiology

## 2022-09-19 ENCOUNTER — OFFICE VISIT (OUTPATIENT)
Dept: NEUROLOGY | Facility: MEDICAL CENTER | Age: 37
End: 2022-09-19
Attending: PSYCHIATRY & NEUROLOGY
Payer: COMMERCIAL

## 2022-09-19 VITALS
TEMPERATURE: 98.1 F | DIASTOLIC BLOOD PRESSURE: 78 MMHG | HEART RATE: 112 BPM | WEIGHT: 163.36 LBS | BODY MASS INDEX: 24.2 KG/M2 | OXYGEN SATURATION: 99 % | SYSTOLIC BLOOD PRESSURE: 138 MMHG | HEIGHT: 69 IN

## 2022-09-19 DIAGNOSIS — G43.709 CHRONIC MIGRAINE W/O AURA W/O STATUS MIGRAINOSUS, NOT INTRACTABLE: ICD-10-CM

## 2022-09-19 PROCEDURE — 700111 HCHG RX REV CODE 636 W/ 250 OVERRIDE (IP): Performed by: PSYCHIATRY & NEUROLOGY

## 2022-09-19 PROCEDURE — 64615 CHEMODENERV MUSC MIGRAINE: CPT | Performed by: PSYCHIATRY & NEUROLOGY

## 2022-09-19 PROCEDURE — 700101 HCHG RX REV CODE 250: Performed by: PSYCHIATRY & NEUROLOGY

## 2022-09-19 RX ADMIN — SODIUM CHLORIDE 155 UNITS: 9 INJECTION INTRAMUSCULAR; INTRAVENOUS; SUBCUTANEOUS at 10:05

## 2022-09-19 ASSESSMENT — FIBROSIS 4 INDEX: FIB4 SCORE: 1.22

## 2022-09-19 NOTE — PROGRESS NOTES
Botulinum Toxin Injection for Chronic Migraine    Last injection date: 6/27/2022  Response: Significant reduction in frequency of severe migraine, she still gets frequent headaches though responds to PRN meds. Stable.   Complication: None but she would like her frontalis injected lower.  Subjective: 16+ migraine days per month, severe, 24 hours      Procedure: Botulinum toxin injection per PREEMPT protocol  Diagnosis: Chronic Migraine  Performed: [facility]  Performed by: Mila Moyer MD  Consent Obtained: Yes (including risks, benefits, and alternatives)  The risk include but not limited to neck pain, headache, migraine, diplopia, slight or partial facial paralysis, eyelid drooping, distant side effects such as breathing problems.  Follow up/Discharge Instructions: Given     Description: The skin was exposed and prepped with alcohol. Verbal/written consent was obtained. Immediately prior to procedure, a time out was called to verify the correct patient, procedure, equipment, support staff and site/side marked as required. Pre-procedure pain reported as 0/10 and post-procedure was 0/10.   Botox (lot #[id number], expiration date 2/2025) was mixed in presence of the patient with preservative free normal saline in standard dilution 10 units in 0.1 ml and injected as follows:  - Procerus 5 units  -  10 units (5 unit per site, 1 site each side)  - Frontalis 20 units (5 units per site, 2 sites each side)  - Temporalis 40 units (5 units per site, 4 sites each side)  - Occipitalis 30 units (5 units per site, 3 sites each side)  - Cervical paraspinals 20 units (5 units per site, 2 sites each side)  - Trapezius 30 units (5 units per site, 3 sites each side)     Total 155 units used and 45 units wasted.   Patient tolerated procedure well with minimal blood loss.   F/u 12 weeks.    Mila Moyer MD  Neurology - Neurophysiology

## 2022-10-17 DIAGNOSIS — M25.561 CHRONIC PAIN OF BOTH KNEES: ICD-10-CM

## 2022-10-17 DIAGNOSIS — M25.562 CHRONIC PAIN OF BOTH KNEES: ICD-10-CM

## 2022-10-17 DIAGNOSIS — G89.29 CHRONIC PAIN OF BOTH KNEES: ICD-10-CM

## 2022-10-17 NOTE — PROGRESS NOTES
I discussed the case with the patient.  She is having a recurrence of left low back pain rating down the left leg.  She is also having worsening bilateral knee pain.  I have ordered x-rays of the bilateral knees.

## 2022-10-20 ENCOUNTER — HOSPITAL ENCOUNTER (OUTPATIENT)
Dept: RADIOLOGY | Facility: MEDICAL CENTER | Age: 37
End: 2022-10-20
Attending: PHYSICAL MEDICINE & REHABILITATION
Payer: COMMERCIAL

## 2022-10-20 ENCOUNTER — OFFICE VISIT (OUTPATIENT)
Dept: PHYSICAL MEDICINE AND REHAB | Facility: MEDICAL CENTER | Age: 37
End: 2022-10-20
Payer: COMMERCIAL

## 2022-10-20 VITALS
SYSTOLIC BLOOD PRESSURE: 138 MMHG | HEART RATE: 100 BPM | WEIGHT: 161.6 LBS | OXYGEN SATURATION: 98 % | DIASTOLIC BLOOD PRESSURE: 70 MMHG | BODY MASS INDEX: 23.93 KG/M2 | HEIGHT: 69 IN | TEMPERATURE: 98.1 F

## 2022-10-20 DIAGNOSIS — M54.16 LUMBAR RADICULOPATHY: ICD-10-CM

## 2022-10-20 DIAGNOSIS — M25.562 BILATERAL CHRONIC KNEE PAIN: ICD-10-CM

## 2022-10-20 DIAGNOSIS — G89.29 CHRONIC THORACIC BACK PAIN, UNSPECIFIED BACK PAIN LATERALITY: ICD-10-CM

## 2022-10-20 DIAGNOSIS — M25.562 ACUTE PAIN OF LEFT KNEE: ICD-10-CM

## 2022-10-20 DIAGNOSIS — R29.898 LEFT LEG WEAKNESS: ICD-10-CM

## 2022-10-20 DIAGNOSIS — Z98.890 HISTORY OF CARPAL TUNNEL RELEASE OF BOTH WRISTS: ICD-10-CM

## 2022-10-20 DIAGNOSIS — M54.2 CERVICALGIA: ICD-10-CM

## 2022-10-20 DIAGNOSIS — R29.2 HYPERREFLEXIA: ICD-10-CM

## 2022-10-20 DIAGNOSIS — G89.29 CHRONIC NECK PAIN: ICD-10-CM

## 2022-10-20 DIAGNOSIS — M25.561 BILATERAL CHRONIC KNEE PAIN: ICD-10-CM

## 2022-10-20 DIAGNOSIS — S83.242A TEAR OF MEDIAL MENISCUS OF LEFT KNEE, CURRENT, UNSPECIFIED TEAR TYPE, INITIAL ENCOUNTER: ICD-10-CM

## 2022-10-20 DIAGNOSIS — M54.6 CHRONIC THORACIC BACK PAIN, UNSPECIFIED BACK PAIN LATERALITY: ICD-10-CM

## 2022-10-20 DIAGNOSIS — Z98.890 HISTORY OF LUMBOSACRAL SPINE SURGERY: ICD-10-CM

## 2022-10-20 DIAGNOSIS — M25.561 CHRONIC PAIN OF BOTH KNEES: ICD-10-CM

## 2022-10-20 DIAGNOSIS — G12.20 MOTOR NEURON DISEASE (HCC): ICD-10-CM

## 2022-10-20 DIAGNOSIS — G89.29 BILATERAL CHRONIC KNEE PAIN: ICD-10-CM

## 2022-10-20 DIAGNOSIS — M25.562 CHRONIC PAIN OF BOTH KNEES: ICD-10-CM

## 2022-10-20 DIAGNOSIS — G89.29 CHRONIC PAIN OF BOTH KNEES: ICD-10-CM

## 2022-10-20 DIAGNOSIS — M54.2 CHRONIC NECK PAIN: ICD-10-CM

## 2022-10-20 PROCEDURE — 99215 OFFICE O/P EST HI 40 MIN: CPT | Performed by: PHYSICAL MEDICINE & REHABILITATION

## 2022-10-20 PROCEDURE — 99417 PROLNG OP E/M EACH 15 MIN: CPT | Performed by: PHYSICAL MEDICINE & REHABILITATION

## 2022-10-20 PROCEDURE — 73564 X-RAY EXAM KNEE 4 OR MORE: CPT | Mod: RT

## 2022-10-20 PROCEDURE — 73564 X-RAY EXAM KNEE 4 OR MORE: CPT | Mod: LT

## 2022-10-20 ASSESSMENT — PAIN SCALES - GENERAL: PAINLEVEL: 2=MINIMAL-SLIGHT

## 2022-10-20 ASSESSMENT — FIBROSIS 4 INDEX: FIB4 SCORE: 1.22

## 2022-10-20 ASSESSMENT — PATIENT HEALTH QUESTIONNAIRE - PHQ9: CLINICAL INTERPRETATION OF PHQ2 SCORE: 0
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
## 2022-11-04 DIAGNOSIS — G43.709 CHRONIC MIGRAINE W/O AURA W/O STATUS MIGRAINOSUS, NOT INTRACTABLE: ICD-10-CM

## 2022-11-11 ENCOUNTER — APPOINTMENT (OUTPATIENT)
Dept: RADIOLOGY | Facility: MEDICAL CENTER | Age: 37
End: 2022-11-11
Attending: PHYSICAL MEDICINE & REHABILITATION
Payer: COMMERCIAL

## 2022-11-11 DIAGNOSIS — M54.6 CHRONIC THORACIC BACK PAIN, UNSPECIFIED BACK PAIN LATERALITY: ICD-10-CM

## 2022-11-11 DIAGNOSIS — M54.2 CHRONIC NECK PAIN: ICD-10-CM

## 2022-11-11 DIAGNOSIS — G89.29 CHRONIC THORACIC BACK PAIN, UNSPECIFIED BACK PAIN LATERALITY: ICD-10-CM

## 2022-11-11 DIAGNOSIS — G89.29 CHRONIC NECK PAIN: ICD-10-CM

## 2022-11-11 PROCEDURE — 72040 X-RAY EXAM NECK SPINE 2-3 VW: CPT

## 2022-11-11 PROCEDURE — 72070 X-RAY EXAM THORAC SPINE 2VWS: CPT

## 2022-12-01 ENCOUNTER — HOSPITAL ENCOUNTER (OUTPATIENT)
Dept: RADIOLOGY | Facility: MEDICAL CENTER | Age: 37
End: 2022-12-01
Attending: PHYSICAL MEDICINE & REHABILITATION
Payer: COMMERCIAL

## 2022-12-01 DIAGNOSIS — G89.29 BILATERAL CHRONIC KNEE PAIN: ICD-10-CM

## 2022-12-01 DIAGNOSIS — M25.562 BILATERAL CHRONIC KNEE PAIN: ICD-10-CM

## 2022-12-01 DIAGNOSIS — M54.16 LUMBAR RADICULOPATHY: ICD-10-CM

## 2022-12-01 DIAGNOSIS — M54.2 CERVICALGIA: ICD-10-CM

## 2022-12-01 DIAGNOSIS — R29.898 LEFT LEG WEAKNESS: ICD-10-CM

## 2022-12-01 DIAGNOSIS — G12.20 MOTOR NEURON DISEASE (HCC): ICD-10-CM

## 2022-12-01 DIAGNOSIS — S83.242A TEAR OF MEDIAL MENISCUS OF LEFT KNEE, CURRENT, UNSPECIFIED TEAR TYPE, INITIAL ENCOUNTER: ICD-10-CM

## 2022-12-01 DIAGNOSIS — M25.561 BILATERAL CHRONIC KNEE PAIN: ICD-10-CM

## 2022-12-01 DIAGNOSIS — Z98.890 HISTORY OF LUMBOSACRAL SPINE SURGERY: ICD-10-CM

## 2022-12-01 DIAGNOSIS — M25.562 ACUTE PAIN OF LEFT KNEE: ICD-10-CM

## 2022-12-01 PROCEDURE — 72146 MRI CHEST SPINE W/O DYE: CPT

## 2022-12-01 PROCEDURE — A9576 INJ PROHANCE MULTIPACK: HCPCS | Performed by: PHYSICAL MEDICINE & REHABILITATION

## 2022-12-01 PROCEDURE — 73721 MRI JNT OF LWR EXTRE W/O DYE: CPT | Mod: LT

## 2022-12-01 PROCEDURE — 72141 MRI NECK SPINE W/O DYE: CPT

## 2022-12-01 PROCEDURE — 700117 HCHG RX CONTRAST REV CODE 255: Performed by: PHYSICAL MEDICINE & REHABILITATION

## 2022-12-01 PROCEDURE — 72158 MRI LUMBAR SPINE W/O & W/DYE: CPT

## 2022-12-01 RX ADMIN — GADOTERIDOL 15 ML: 279.3 INJECTION, SOLUTION INTRAVENOUS at 10:47

## 2022-12-07 DIAGNOSIS — M25.562 BILATERAL CHRONIC KNEE PAIN: ICD-10-CM

## 2022-12-07 DIAGNOSIS — M54.6 THORACIC BACK PAIN, UNSPECIFIED BACK PAIN LATERALITY, UNSPECIFIED CHRONICITY: ICD-10-CM

## 2022-12-07 DIAGNOSIS — M54.2 CHRONIC NECK PAIN: ICD-10-CM

## 2022-12-07 DIAGNOSIS — G89.29 BILATERAL CHRONIC KNEE PAIN: ICD-10-CM

## 2022-12-07 DIAGNOSIS — M50.30 DDD (DEGENERATIVE DISC DISEASE), CERVICAL: ICD-10-CM

## 2022-12-07 DIAGNOSIS — M17.10 ARTHRITIS OF KNEE: ICD-10-CM

## 2022-12-07 DIAGNOSIS — Z98.890 HISTORY OF LUMBOSACRAL SPINE SURGERY: ICD-10-CM

## 2022-12-07 DIAGNOSIS — G89.29 CHRONIC NECK PAIN: ICD-10-CM

## 2022-12-07 DIAGNOSIS — M47.812 CERVICAL SPONDYLOSIS: ICD-10-CM

## 2022-12-07 DIAGNOSIS — M25.561 BILATERAL CHRONIC KNEE PAIN: ICD-10-CM

## 2022-12-07 DIAGNOSIS — M54.16 LUMBAR RADICULOPATHY: ICD-10-CM

## 2022-12-08 NOTE — PROGRESS NOTES
I reviewed the patient's imaging studies with the MRI of the lumbar spine, MRI of the knee, MRI of the thoracic and cervical spine as well as the x-rays.  I called the patient and discussed the results of these.      We decided to proceed with physical therapy.    I sent a prescription of meloxicam to the pharmacy.  The patient can use this as needed.

## 2022-12-15 ENCOUNTER — OFFICE VISIT (OUTPATIENT)
Dept: NEUROLOGY | Facility: MEDICAL CENTER | Age: 37
End: 2022-12-15
Attending: PSYCHIATRY & NEUROLOGY
Payer: COMMERCIAL

## 2022-12-15 VITALS
OXYGEN SATURATION: 100 % | TEMPERATURE: 97.3 F | WEIGHT: 168.65 LBS | BODY MASS INDEX: 24.91 KG/M2 | SYSTOLIC BLOOD PRESSURE: 126 MMHG | HEART RATE: 98 BPM | DIASTOLIC BLOOD PRESSURE: 82 MMHG

## 2022-12-15 DIAGNOSIS — G40.909 SEIZURE DISORDER (HCC): ICD-10-CM

## 2022-12-15 DIAGNOSIS — G43.701 CHRONIC MIGRAINE WITHOUT AURA WITH STATUS MIGRAINOSUS, NOT INTRACTABLE: ICD-10-CM

## 2022-12-15 PROCEDURE — 700101 HCHG RX REV CODE 250: Performed by: PSYCHIATRY & NEUROLOGY

## 2022-12-15 PROCEDURE — 700111 HCHG RX REV CODE 636 W/ 250 OVERRIDE (IP): Performed by: PSYCHIATRY & NEUROLOGY

## 2022-12-15 PROCEDURE — 64615 CHEMODENERV MUSC MIGRAINE: CPT | Performed by: PSYCHIATRY & NEUROLOGY

## 2022-12-15 RX ORDER — LACOSAMIDE 100 MG/1
100 TABLET ORAL
COMMUNITY
Start: 2022-12-07 | End: 2022-12-15 | Stop reason: SDUPTHER

## 2022-12-15 RX ORDER — LACOSAMIDE 100 MG/1
100 TABLET ORAL 2 TIMES DAILY
Qty: 60 TABLET | Refills: 5 | Status: SHIPPED | OUTPATIENT
Start: 2022-12-15 | End: 2023-06-13

## 2022-12-15 RX ADMIN — SODIUM CHLORIDE 155 UNITS: 9 INJECTION INTRAMUSCULAR; INTRAVENOUS; SUBCUTANEOUS at 08:34

## 2022-12-15 ASSESSMENT — FIBROSIS 4 INDEX: FIB4 SCORE: 1.22

## 2022-12-15 NOTE — PROGRESS NOTES
Botulinum Toxin Injection for Chronic Migraine    Last injection date: 9/19/2022  Response: 7-8 migraines per month, most mild   Complication: None but she would like her frontalis injected lower.  Subjective: 16+ migraine days per month, severe, 24 hours     Procedure: Botulinum toxin injection per PREEMPT protocol  Diagnosis: Chronic Migraine  Performed: [facility]  Performed by: Mila Moyer MD  Consent Obtained: Yes (including risks, benefits, and alternatives)  The risk include but not limited to neck pain, headache, migraine, diplopia, slight or partial facial paralysis, eyelid drooping, distant side effects such as breathing problems.  Follow up/Discharge Instructions: Given     Description: The skin was exposed and prepped with alcohol. Verbal consent was obtained.  Pre-procedure pain reported as 0/10 and post-procedure was 0/10.   Botox (lot #[id number], expiration date 03/2025) was mixed in presence of the patient with preservative free normal saline in standard dilution 10 units in 0.1 ml and injected as follows:  - Procerus 5 units  -  10 units (5 unit per site, 1 site each side)  - Frontalis 20 units (5 units per site, 2 sites each side)  - Temporalis 40 units (5 units per site, 4 sites each side)  - Occipitalis 30 units (5 units per site, 3 sites each side)  - Cervical paraspinals 20 units (5 units per site, 2 sites each side)  - Trapezius 30 units (5 units per site, 3 sites each side)     Total 155 units used and 45 units wasted.   Patient tolerated procedure well with minimal blood loss.   F/u 12 weeks.  Patient reminded to get EKG. She communicated understanding.     Mila Moyer MD  Neurology - Neurophysiology

## 2023-01-12 ENCOUNTER — PHYSICAL THERAPY (OUTPATIENT)
Dept: PHYSICAL THERAPY | Facility: MEDICAL CENTER | Age: 38
End: 2023-01-12
Attending: PHYSICAL MEDICINE & REHABILITATION
Payer: COMMERCIAL

## 2023-01-12 DIAGNOSIS — M54.2 CHRONIC NECK PAIN: ICD-10-CM

## 2023-01-12 DIAGNOSIS — M54.6 THORACIC BACK PAIN, UNSPECIFIED BACK PAIN LATERALITY, UNSPECIFIED CHRONICITY: ICD-10-CM

## 2023-01-12 DIAGNOSIS — M54.16 LUMBAR RADICULOPATHY: ICD-10-CM

## 2023-01-12 DIAGNOSIS — M50.30 DDD (DEGENERATIVE DISC DISEASE), CERVICAL: ICD-10-CM

## 2023-01-12 DIAGNOSIS — Z98.890 HISTORY OF LUMBOSACRAL SPINE SURGERY: ICD-10-CM

## 2023-01-12 DIAGNOSIS — M47.812 CERVICAL SPONDYLOSIS: ICD-10-CM

## 2023-01-12 DIAGNOSIS — M25.561 BILATERAL CHRONIC KNEE PAIN: ICD-10-CM

## 2023-01-12 DIAGNOSIS — M25.562 BILATERAL CHRONIC KNEE PAIN: ICD-10-CM

## 2023-01-12 DIAGNOSIS — G89.29 CHRONIC NECK PAIN: ICD-10-CM

## 2023-01-12 DIAGNOSIS — M17.10 ARTHRITIS OF KNEE: ICD-10-CM

## 2023-01-12 DIAGNOSIS — G89.29 BILATERAL CHRONIC KNEE PAIN: ICD-10-CM

## 2023-01-12 PROCEDURE — 97110 THERAPEUTIC EXERCISES: CPT

## 2023-01-12 PROCEDURE — 97162 PT EVAL MOD COMPLEX 30 MIN: CPT

## 2023-01-12 ASSESSMENT — ENCOUNTER SYMPTOMS
EXACERBATED BY: CARRYING
EXACERBATED BY: BENDING
EXACERBATED BY: STAIR CLIMBING
EXACERBATED BY: LIFTING
ALLEVIATING FACTORS: HEAT APPLICATION
ALLEVIATING FACTORS: COLD/ICE
MIGRAINE HEADACHES: 1

## 2023-01-12 NOTE — OP THERAPY EVALUATION
Outpatient Physical Therapy  INITIAL EVALUATION    [facility] Outpatient Physical Therapy  [address]  [place] [address]  Phone:  [phone]  Fax:  [phone]    Date of Evaluation: 01/12/2023    Patient: Roxanna Guzmán  YOB: 1985  MRN: [id number]     Referring Provider: Aldair Segovia M.D.  [address]  [address]  [place] [address]   Referring Diagnosis Bilateral chronic knee pain [M25.561, M25.562, G89.29];Arthritis of knee [M17.10];Lumbar radiculopathy [M54.16];History of lumbosacral spine surgery [Z98.890];Chronic neck pain [M54.2, G89.29];Cervical spondylosis [M47.812];DDD (degenerative disc disease), cervical [M50.30];Thoracic back pain, unspecified back pain laterality, unspecified chronicity [M54.6]     Time Calculation  Start time: 0936  Stop time: 1022 Time Calculation (min): 46 minutes         Chief Complaint: Neck Pain, Back Problem, Post-Op Pain, and Knee Injury    Visit Diagnoses     ICD-10-CM   1. Bilateral chronic knee pain  M25.561    M25.562    G89.29   2. Arthritis of knee  M17.10   3. Lumbar radiculopathy  M54.16   4. History of lumbosacral spine surgery  Z98.890   5. Chronic neck pain  M54.2    G89.29   6. Cervical spondylosis  M47.812   7. DDD (degenerative disc disease), cervical  M50.30   8. Thoracic back pain, unspecified back pain laterality, unspecified chronicity  M54.6       Date of onset of impairment: 1/12/2020    Subjective:   History of Present Illness:     Date of onset:  1/12/2019    Mechanism of injury:  Pt referred from physiatry for PT for persistent, chronic, widespread pain.  Includes neck pain, lumbar radiculopathy (post-op laminectomy 2021), SI pain from pregnancy, knee pain.  Pt had PT after lumbar surgery, but was pregnant at the time, with significant SI pain, so PT focused on that pain and was limited in exercises.  Pt reports chin tucks were helpful for her neck pain.  Pt reports yoga  "provides pain relief during class.  Pt does a 60 min flow/ vinyasana yoga class about 1x/wk.    Son is 15 months old, she holds him on her left hip, which immediately increases Right back pain.  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  Pt also reports frequent migraines.  Walking and standing are not aggravating factors.  Pt reports it is hard to get to sleep, uses medication.  Walking when she wakes from supine or prone is painful, feels like her back will \"go out\". Braces her back and abdomen with hands.  Pt reports altered sensation in left shin and foot s/p lumbar surgery (whereas it had been numb prior to surgery).  Sometimes gets numbness/tingling in hands during night, cassy if sidelying.  Had B CTR March 2022 due to constant pain and numbness.  Currently, achy again in wrists, cassy with holding son and with repetitive motion at work.  Left knee injury last fall.  States it is not as painful/ bothersome as neck, back, and left leg.  Feels painful with stair climbing.  Weakness in left leg from lumbar disc herniation has not resolved post-op.  Feels good to stretch shin and foot and glute.    Pt works 3-4 days a week.  Has mostly decreased from 4 to 3 days a week due to pain.  Pt reports she is also suffering from depression, and is trying to treat it.  Pt reports it is very difficult with a young child and a busy life to set aside time for herself to do exercises or activities.  Headaches:  migraine headaches  Sleep disturbance:  Difficulty falling asleep and uses sleep aids to fall asleep  Pain:     Relieving factors:  Heat application and cold/ice (ibuprofen, icy hot, yoga)    Aggravating factors:  Stair climbing, bending, carrying and lifting  Social Support:     Lives with:  Spouse and young children  Treatments:     Previous treatment:  Physical therapy    Current treatment:  Massage, activity modification, heat, ice, yoga and " medication  Patient Goals:     Patient goals for therapy:  Decreased pain, independence with ADLs/IADLs and increased strength    Past Medical History:   Diagnosis Date    Anesthesia 02/10/2021    mother hx apnea    Anxiety     Depression     Migraine     Seizure disorder, complex partial (HCC) 02/10/2021    hx of (last 1/2021)    TMJ arthropathy      Past Surgical History:   Procedure Laterality Date    UT NEUROPLASTY & OR TRANSPOS MEDIAN NRV CARPAL MATIAS Bilateral 3/15/2022    Procedure: LEFT OPEN CARPAL TUNNEL RELEASE, RIGHT OPEN CARPAL TUNNEL RELEASE;  Surgeon: Marlene Odom M.D.;  Location: [facility];  Service: Orthopedics    PB LAMINOTOMY,LUMBAR DISK,1 INTRSP Left 2/24/2021    Procedure: LAMINECTOMY, SPINE, LUMBAR, WITH RHIPRWILBV-V3-3;  Surgeon: Indra Goode M.D.;  Location: [facility];  Service: Neurosurgery    LUMBAR TRANSFORAMINAL EPIDURAL STEROID INJECTION Bilateral 2/1/2021    Procedure: INJECTION, STEROID, SPINE, LUMBAR, EPIDURAL, TRANSFORAMINAL APPROACH;  Surgeon: Aldair Segovia M.D.;  [facility];  Service: Pain Management    BLEPHAROPLASTY Bilateral 2019    LEE  2003    HERNIA REPAIR      LIPOMA EXCISION       Social History     Tobacco Use    Smoking status: Never    Smokeless tobacco: Never   Substance Use Topics    Alcohol use: Yes     Alcohol/week: 1.2 oz     Types: 2 Glasses of wine per week     Family and Occupational History     Socioeconomic History    Marital status:      Spouse name: Not on file    Number of children: Not on file    Years of education: Not on file    Highest education level: Not on file   Occupational History    Not on file       Objective     Static Posture   General Observations  Guarded.     Thoracic Spine  Hypokyphosis.    Hip Screen   Hip range of motion within functional limits.    Angel LumbarTest   Static tests   Lying prone: no change  Lying prone on elbows: pressure or stretch  "reported      Therapeutic Exercises (CPT 50934):     1. bent knee BRANDON stretch    2. bent knee piriformis stretch    3. hooklying TA bracing    4. hookling TA march    5. DKC ball roll with PF/DF for sciatic nerve glide    6. alt UE flexion at wall    7. Ws against wall    9. scap retraction against wall      Therapeutic Exercise Summary: Pt performed these exercises with instruction and SPV.  Provided handout with these exercises for daily HEP.   Recommend performing lumbar/hip exercises first thing in bed in morning, 4-5 minutes, daily, and performing neck/shoulder exercises on lunch break on work days, and sometime midday when not working.  Provided pt with \"Why Do I Hurt\" workbook.  Used images and text in book to provide education re: nervous system, the body's alarm system, an extra sensitive alarm system, the brain's natural pain relievers, and the four pillars of treatment for chronic pain.      Time-based treatments/modalities:    Physical Therapy Timed Treatment Charges  Therapeutic exercise minutes (CPT 59458): 10 minutes      Assessment, Response and Plan:   Impairments: difficulty performing job, impaired physical strength, lacks appropriate home exercise program and pain with function    Assessment details:  37 y.o. female presents with chronic neck, back, and leg pain and weakness.  Pt demonstrates decreased lumbar stabilization strength, impaired postural endurance, and signs/symptoms of central sensitization.  Pt will benefit from skilled PT services for pain neuroscience education, core strengthening, and increased activity tolerance.   Prognosis: fair    Goals:   Short Term Goals:   1. Pt able to hold son for 5 minutes without increased pain.  2. Pt reports less neck pain at end of work day.  3. Pt able to participate in cumulative 20 min of physical activity most days of the week.  Short term goal time span:  2-4 weeks      Long Term Goals:    1. Pt able to hold son for 12 minutes without " increased pain.  2. Pt reports less difficulty falling asleep.  3. Pt participates in at least 10 minutes aerobic activity most days of the week.  4. Pt reports less pain and difficulty with getting up from bed and walking in the morning.  Long term goal time span:  6-8 weeks    Plan:   Therapy options:  Physical therapy treatment to continue  Planned therapy interventions:  E Stim Unattended (CPT 04242), Mechanical Traction (CPT 18491), Neuromuscular Re-education (CPT 29042), Therapeutic Activities (CPT 12246), Therapeutic Exercise (CPT 19012) and Manual Therapy (CPT 60618)  Frequency:  1x week  Duration in visits:  8  Discussed with:  Patient          Referring provider co-signature:  I have reviewed this plan of care and my co-signature certifies the need for services.    Certification Period: 01/12/2023 to  03/13/23    Physician Signature: ________________________________ Date: ______________

## 2023-01-19 ENCOUNTER — PHYSICAL THERAPY (OUTPATIENT)
Dept: PHYSICAL THERAPY | Facility: MEDICAL CENTER | Age: 38
End: 2023-01-19
Attending: PHYSICAL MEDICINE & REHABILITATION
Payer: COMMERCIAL

## 2023-01-19 DIAGNOSIS — M25.561 BILATERAL CHRONIC KNEE PAIN: ICD-10-CM

## 2023-01-19 DIAGNOSIS — M54.16 LUMBAR RADICULOPATHY: ICD-10-CM

## 2023-01-19 DIAGNOSIS — M54.2 CHRONIC NECK PAIN: ICD-10-CM

## 2023-01-19 DIAGNOSIS — M50.30 DDD (DEGENERATIVE DISC DISEASE), CERVICAL: ICD-10-CM

## 2023-01-19 DIAGNOSIS — M47.812 CERVICAL SPONDYLOSIS: ICD-10-CM

## 2023-01-19 DIAGNOSIS — M54.6 THORACIC BACK PAIN, UNSPECIFIED BACK PAIN LATERALITY, UNSPECIFIED CHRONICITY: ICD-10-CM

## 2023-01-19 DIAGNOSIS — Z98.890 HISTORY OF LUMBOSACRAL SPINE SURGERY: ICD-10-CM

## 2023-01-19 DIAGNOSIS — M17.10 ARTHRITIS OF KNEE: ICD-10-CM

## 2023-01-19 DIAGNOSIS — G89.29 BILATERAL CHRONIC KNEE PAIN: ICD-10-CM

## 2023-01-19 DIAGNOSIS — M25.562 BILATERAL CHRONIC KNEE PAIN: ICD-10-CM

## 2023-01-19 DIAGNOSIS — G89.29 CHRONIC NECK PAIN: ICD-10-CM

## 2023-01-19 PROCEDURE — 97110 THERAPEUTIC EXERCISES: CPT

## 2023-01-19 PROCEDURE — 97535 SELF CARE MNGMENT TRAINING: CPT

## 2023-01-19 NOTE — OP THERAPY DAILY TREATMENT
Outpatient Physical Therapy  DAILY TREATMENT     [facility] Outpatient Physical Therapy  [address]  [place] [address]  Phone:  [phone]  Fax:  [phone]    Date: 01/19/2023    Patient: Roxanna Guzmán  YOB: 1985  MRN: [id number]     Time Calculation    Start time: 0930  Stop time: 1040 Time Calculation (min): 70 minutes         Chief Complaint: Back Problem and Neck Problem    Visit #: 2    SUBJECTIVE:  Frustrated and tearful. Was unable to find time to complete her HEP. Continued pain complaints: neck pain, B wrist pain, lumbar stiffness and L LE are her biggest complaints. She is just coming off having a migraine. These have been occurring 3 days/week, typically R temporal.      OBJECTIVE:        Therapeutic Exercises (CPT 31282):     1. 3D pelvic tilt, x 20 ea    2. LTR, x 10 ea    3. KTOS, x 1 min    4. Supine TA march, x 2 min    5. TA BKFO, x 2 min    6. SL t/s openers, x 10 ea    7. Self SOR with peanut ball, x 5 min    8. Self txn through pillow case, x 1 min, did not feel relief. prefers yoga style inversions/fwd fold    9. 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 Self Care ADL Training (CPT 13549), Extensive discussion reviewing education from last visit regarding pain pathways, interaction of biopsychosocial features, importance of positive mindset with regard to movement. Discussed pt's daily routine and identified time in the evening that may be her best opportunity to perform her HEP. Set goal for 5 mins 3 days a week for now.  Time-based treatments/modalities:    Physical Therapy Timed Treatment Charges  Functional training, self care minutes (CPT 53833): 40 minutes  Therapeutic exercise minutes (CPT 33432): 30 minutes    ASSESSMENT:   Response to treatment: Pt is receptive and engaged during education. Work/family demands are a barrier to  compliance with the HEP, though it does seem she is ready to move past the contemplative phase. Reduced goal to 5 mins of HEP 3 days a week, hoping to be able to build off success at this level. Reports relief with SOR and will be purchasing a peanut ball for home.     PLAN/RECOMMENDATIONS:   Plan for treatment: therapy treatment to continue next visit.  Planned interventions for next visit: continue with current treatment.

## 2023-02-02 ENCOUNTER — PHYSICAL THERAPY (OUTPATIENT)
Dept: PHYSICAL THERAPY | Facility: MEDICAL CENTER | Age: 38
End: 2023-02-02
Attending: PHYSICAL MEDICINE & REHABILITATION
Payer: COMMERCIAL

## 2023-02-02 DIAGNOSIS — M54.16 LUMBAR RADICULOPATHY: ICD-10-CM

## 2023-02-02 DIAGNOSIS — M50.30 DDD (DEGENERATIVE DISC DISEASE), CERVICAL: ICD-10-CM

## 2023-02-02 DIAGNOSIS — M54.2 CHRONIC NECK PAIN: ICD-10-CM

## 2023-02-02 DIAGNOSIS — G89.29 BILATERAL CHRONIC KNEE PAIN: ICD-10-CM

## 2023-02-02 DIAGNOSIS — M25.562 BILATERAL CHRONIC KNEE PAIN: ICD-10-CM

## 2023-02-02 DIAGNOSIS — G89.29 CHRONIC NECK PAIN: ICD-10-CM

## 2023-02-02 DIAGNOSIS — Z98.890 HISTORY OF LUMBOSACRAL SPINE SURGERY: ICD-10-CM

## 2023-02-02 DIAGNOSIS — M47.812 CERVICAL SPONDYLOSIS: ICD-10-CM

## 2023-02-02 DIAGNOSIS — M17.10 ARTHRITIS OF KNEE: ICD-10-CM

## 2023-02-02 DIAGNOSIS — M25.561 BILATERAL CHRONIC KNEE PAIN: ICD-10-CM

## 2023-02-02 PROCEDURE — 97110 THERAPEUTIC EXERCISES: CPT

## 2023-02-09 ENCOUNTER — APPOINTMENT (OUTPATIENT)
Dept: PHYSICAL THERAPY | Facility: MEDICAL CENTER | Age: 38
End: 2023-02-09
Attending: PHYSICAL MEDICINE & REHABILITATION
Payer: COMMERCIAL

## 2023-02-16 ENCOUNTER — APPOINTMENT (OUTPATIENT)
Dept: PHYSICAL THERAPY | Facility: MEDICAL CENTER | Age: 38
End: 2023-02-16
Attending: PHYSICAL MEDICINE & REHABILITATION
Payer: COMMERCIAL

## 2023-02-23 ENCOUNTER — APPOINTMENT (OUTPATIENT)
Dept: PHYSICAL THERAPY | Facility: MEDICAL CENTER | Age: 38
End: 2023-02-23
Attending: PHYSICAL MEDICINE & REHABILITATION
Payer: COMMERCIAL

## 2023-03-01 ENCOUNTER — TELEPHONE (OUTPATIENT)
Dept: PHYSICAL THERAPY | Facility: MEDICAL CENTER | Age: 38
End: 2023-03-01
Payer: COMMERCIAL

## 2023-03-01 NOTE — OP THERAPY DISCHARGE SUMMARY
Outpatient Physical Therapy  DISCHARGE SUMMARY NOTE      [facility] Outpatient Physical Therapy  [address]  [place] [address]  Phone:  [phone]  Fax:  [phone]    Date of Visit: 03/01/2023    Patient: Roxanna Guzmán  YOB: 1985  MRN: [id number]     Referring Provider: Aldair Segovia M.D.  [address]  [address]  [place] [address]   Referring Diagnosis Bilateral chronic knee pain [M25.561, M25.562, G89.29];Arthritis of knee [M17.10];Lumbar radiculopathy [M54.16];History of lumbosacral spine surgery [Z98.890];Chronic neck pain [M54.2, G89.29];Cervical spondylosis [M47.812];DDD (degenerative disc disease), cervical [M50.30];Thoracic back pain, unspecified back pain laterality, unspecified chronicity [M54.6]         Functional Assessment Used        Your patient is being discharged from Physical Therapy with the following comments:   Other    Comments:  Ms. Guzmán was seen for 3 PT sessions treating her global pain complaints with signs of central sensitization. She was responsive to PNE and was working on carving out time to dedicate to her mindfulness and movement practices.  Ms. Guzmán called to cancel the remaining sessions today. She will obtain a new referral when she feels it to be appropriate to return to therapy for skilled guidance.     Stephanie Carey, PT, DPT    Date: 3/1/2023

## 2023-03-02 ENCOUNTER — APPOINTMENT (OUTPATIENT)
Dept: PHYSICAL THERAPY | Facility: MEDICAL CENTER | Age: 38
End: 2023-03-02
Attending: PHYSICAL MEDICINE & REHABILITATION
Payer: COMMERCIAL

## 2023-03-07 ENCOUNTER — OFFICE VISIT (OUTPATIENT)
Dept: NEUROLOGY | Facility: MEDICAL CENTER | Age: 38
End: 2023-03-07
Attending: PHYSICAL MEDICINE & REHABILITATION
Payer: COMMERCIAL

## 2023-03-07 VITALS
WEIGHT: 169.09 LBS | OXYGEN SATURATION: 98 % | HEART RATE: 73 BPM | BODY MASS INDEX: 25.04 KG/M2 | HEIGHT: 69 IN | RESPIRATION RATE: 16 BRPM | DIASTOLIC BLOOD PRESSURE: 68 MMHG | TEMPERATURE: 98.1 F | SYSTOLIC BLOOD PRESSURE: 114 MMHG

## 2023-03-07 DIAGNOSIS — G43.701 CHRONIC MIGRAINE WITHOUT AURA WITH STATUS MIGRAINOSUS, NOT INTRACTABLE: Primary | ICD-10-CM

## 2023-03-07 PROCEDURE — 700111 HCHG RX REV CODE 636 W/ 250 OVERRIDE (IP): Performed by: PHYSICAL MEDICINE & REHABILITATION

## 2023-03-07 PROCEDURE — 64615 CHEMODENERV MUSC MIGRAINE: CPT | Performed by: PHYSICAL MEDICINE & REHABILITATION

## 2023-03-07 PROCEDURE — 700101 HCHG RX REV CODE 250: Performed by: PHYSICAL MEDICINE & REHABILITATION

## 2023-03-07 RX ADMIN — SODIUM CHLORIDE 155 UNITS: 9 INJECTION, SOLUTION INTRAMUSCULAR; INTRAVENOUS; SUBCUTANEOUS at 08:22

## 2023-03-07 ASSESSMENT — PATIENT HEALTH QUESTIONNAIRE - PHQ9: CLINICAL INTERPRETATION OF PHQ2 SCORE: 0

## 2023-03-07 ASSESSMENT — FIBROSIS 4 INDEX: FIB4 SCORE: 1.22

## 2023-03-07 NOTE — PROCEDURES
[facility]  Physical Medicine & Rehabilitation Clinic  [address] [place] [address]  Ph: [phone]    PROCEDURE NOTE    Procedure: Botulinum Injection  Primary Dx:   1. Chronic migraine without aura with status migrainosus, not intractable  onabotulinum toxin A (Botox) injection 155 Units            SUBJECTIVE:  Botox has been successful in reducing the intensity and frequency of migraines.  Denies recent illnesses or use of antibiotics.  Denies any significant negative side effects from the injections.   Has been doing Botox for about 5 years.   Is on Maxalt for rescue. She might have clusters where she uses 3-4 per week and other times can go several week without.     OBJECTIVE:  Vitals:    03/07/23 0759   BP: 114/68   Pulse: 73   Resp: 16   Temp: 36.7 °C (98.1 °F)   SpO2: 98%         ASSESSMENT&PLAN:  Botox therapy has reduced patient’s migraines by more than 7 days and/or 100 hours per month.  Although this patient is responding to botox, the patient is  NOT migraine free, however, and it is my recommendation Botox be continued at this time.  Frequency of headaches is >15 days monthly with at least 8 migraines monthly; Migraines include at least two of the following: worsened with activity or avoidance of activity with migraines (ie they go lie down), moderate to severe pain intensity, pulsing headache, unilateral headache and has  have either nausea or vomiting OR sensitivity to light and sound.    INFORMED CONSENT   The risks and benefits of the procedure were explained to the patient, and all questions were answered. Benefits of the injection include spasticity reduction by decrease in muscle activation following toxin injection. Adverse effects from toxin injection include but are not limited to: excessive weakness, infection, breathing and/or swallowing difficulty.  Common adverse effects from the injection itself are pain and bruising. The patient demonstrated good understanding of risks and  benefits and consents to botulinum toxin injection.     Pre-procedure time out was performed.     PROCEDURE:  After clean preparation of skin using an alcohol swab, a specialized hollow electromyography needle was inserted into the muscle(s) listed below.         10 units divided in 2 sites   Procerus  5 units in 1 site   Frontalis  20 units divided in 4 sites   Temporalis  40 units divided into 8 sites   Occipitalis  30 units divided into 6 sites   Cervical paraspinals  20 units divided into 4 sites   Trapezius 30 units divided into 6 sites     I treated this patient in clinic today with BotoxA 155 units according to the dosing/injection paradigm currently mandated by the FDA for the management of chronic migraine. Specifically, I injected 5 units to the procerus, 5 units to the corrugators bilaterally, a total of 20 units to the frontalis musculature, 20 units to the temporalis bilaterally, 15 units to the occipitalis bilaterally, 10 units to the cervical paraspinals bilaterally and 15 units to the trapezius musculature bilaterally. The remainder of the Botox 200 units mixed but not administered was discarded as wastage per FDA guidelines  Consent on file.  Patient identify verified with 2 patient identifiers.    Injection sites were cleaned with alcohol and band aid was applied afterwards.  The patient tolerated the procedure well.  There were no complications.     MEDICATION USED:  200 units of botulinum toxin type A, mixed with 4 cc of sterile, preservative-free normal saline in a 4 separate 1 cc syringes.    Total units injected: 155 units  Total units discarded: 45 units    This patient has chronic migraines, defined as having 15 or more headaches days per month, 8 of which are migraines, over a minimum of the last three months. Episodes last more than 4 hours (untreated).  Pt has 2 or more of following (see initial note) -  Headache worsened with activity, pain is moderate to severe intensity,  pulsing in nature, unilateral and patient either has nausea/vomiting OR sensitivity to light and sound. This patient does not also have medication overuse headache.    No adverse effect of Botox noted at conclusion of today's appointment. Pt was instructed to seek immediate medical attention if fever, difficulty breathing, difficulty swallowing, or other concerning sxs arise. Counseled patient not to rub face, exercise, or use heat for at least 24 hours to prevent spreading of medication. Counseled that while every attempt possible is made to avoid minor and major blood vessels, mild bruising can occur due to injection and should resolve within 5-7 days.    RTC in 3 months for repeat injection, or sooner if concerns arise.     Dr. Elba Thomas DO, MS  Department of Physical Medicine & Rehabilitation  Neuro Rehabilitation Clinic  [facility]

## 2023-03-09 ENCOUNTER — APPOINTMENT (OUTPATIENT)
Dept: PHYSICAL THERAPY | Facility: MEDICAL CENTER | Age: 38
End: 2023-03-09
Attending: PHYSICAL MEDICINE & REHABILITATION
Payer: COMMERCIAL

## 2023-03-16 ENCOUNTER — APPOINTMENT (OUTPATIENT)
Dept: PHYSICAL THERAPY | Facility: MEDICAL CENTER | Age: 38
End: 2023-03-16
Attending: PHYSICAL MEDICINE & REHABILITATION
Payer: COMMERCIAL

## 2023-05-15 NOTE — PROGRESS NOTES
Med rec complete per pt at bedside  Interviewed pt with family at bedside with permission from pt  Allergies reviewed and updated.       Improved

## 2023-05-16 ENCOUNTER — TELEPHONE (OUTPATIENT)
Dept: HEALTH INFORMATION MANAGEMENT | Facility: OTHER | Age: 38
End: 2023-05-16
Payer: COMMERCIAL

## 2023-06-06 ENCOUNTER — OFFICE VISIT (OUTPATIENT)
Dept: NEUROLOGY | Facility: MEDICAL CENTER | Age: 38
End: 2023-06-06
Attending: PHYSICAL MEDICINE & REHABILITATION
Payer: COMMERCIAL

## 2023-06-06 VITALS
TEMPERATURE: 98.2 F | HEART RATE: 102 BPM | WEIGHT: 160 LBS | OXYGEN SATURATION: 97 % | SYSTOLIC BLOOD PRESSURE: 122 MMHG | RESPIRATION RATE: 16 BRPM | HEIGHT: 69 IN | DIASTOLIC BLOOD PRESSURE: 74 MMHG | BODY MASS INDEX: 23.7 KG/M2

## 2023-06-06 DIAGNOSIS — G43.701 CHRONIC MIGRAINE WITHOUT AURA WITH STATUS MIGRAINOSUS, NOT INTRACTABLE: Primary | ICD-10-CM

## 2023-06-06 PROCEDURE — 3074F SYST BP LT 130 MM HG: CPT | Performed by: PHYSICAL MEDICINE & REHABILITATION

## 2023-06-06 PROCEDURE — 700101 HCHG RX REV CODE 250: Performed by: PHYSICAL MEDICINE & REHABILITATION

## 2023-06-06 PROCEDURE — 700111 HCHG RX REV CODE 636 W/ 250 OVERRIDE (IP): Performed by: PHYSICAL MEDICINE & REHABILITATION

## 2023-06-06 PROCEDURE — 64615 CHEMODENERV MUSC MIGRAINE: CPT | Performed by: PHYSICAL MEDICINE & REHABILITATION

## 2023-06-06 PROCEDURE — 3078F DIAST BP <80 MM HG: CPT | Performed by: PHYSICAL MEDICINE & REHABILITATION

## 2023-06-06 RX ADMIN — SODIUM CHLORIDE 155 UNITS: 9 INJECTION, SOLUTION INTRAMUSCULAR; INTRAVENOUS; SUBCUTANEOUS at 08:40

## 2023-06-06 ASSESSMENT — FIBROSIS 4 INDEX: FIB4 SCORE: 1.22

## 2023-06-06 NOTE — PROCEDURES
[facility]  Physical Medicine & Rehabilitation Clinic  [address] [place] [address]  Ph: [phone]    PROCEDURE NOTE    Procedure: Botulinum Injection  Primary Dx:   1. Chronic migraine without aura with status migrainosus, not intractable  onabotulinum toxin A (Botox) injection 155 Units            SUBJECTIVE:  Botox has been successful in reducing the intensity and frequency of migraines.  Denies recent illnesses or use of antibiotics.  Denies any significant negative side effects from the injections.   Botox is working for her. She is wondering if maybe it ever starts to plateau.   It is not inefficacious for her, it still works.     OBJECTIVE:  Vitals:    06/06/23 0809   BP: 122/74   Pulse: (!) 102   Resp: 16   Temp: 36.8 °C (98.2 °F)   SpO2: 97%         ASSESSMENT&PLAN:  Botox therapy has reduced patient’s migraines by more than 7 days and/or 100 hours per month.  Although this patient is responding to botox, the patient is  NOT migraine free, however, and it is my recommendation Botox be continued at this time.  Frequency of headaches is >15 days monthly with at least 8 migraines monthly; Migraines include at least two of the following: worsened with activity or avoidance of activity with migraines (ie they go lie down), moderate to severe pain intensity, pulsing headache, unilateral headache and has  have either nausea or vomiting OR sensitivity to light and sound.    INFORMED CONSENT   The risks and benefits of the procedure were explained to the patient, and all questions were answered. Benefits of the injection include spasticity reduction by decrease in muscle activation following toxin injection. Adverse effects from toxin injection include but are not limited to: excessive weakness, infection, breathing and/or swallowing difficulty.  Common adverse effects from the injection itself are pain and bruising. The patient demonstrated good understanding of risks and benefits and consents to  botulinum toxin injection.     Pre-procedure time out was performed.     PROCEDURE:  After clean preparation of skin using an alcohol swab, a specialized hollow electromyography needle was inserted into the muscle(s) listed below.         10 units divided in 2 sites   Procerus  5 units in 1 site   Frontalis  20 units divided in 4 sites   Temporalis  40 units divided into 8 sites   Occipitalis  30 units divided into 6 sites   Cervical paraspinals  20 units divided into 4 sites   Trapezius 30 units divided into 6 sites     I treated this patient in clinic today with BotoxA 155 units according to the dosing/injection paradigm currently mandated by the FDA for the management of chronic migraine. Specifically, I injected 5 units to the procerus, 5 units to the corrugators bilaterally, a total of 20 units to the frontalis musculature, 20 units to the temporalis bilaterally, 15 units to the occipitalis bilaterally, 10 units to the cervical paraspinals bilaterally and 15 units to the trapezius musculature bilaterally. The remainder of the Botox 200 units mixed but not administered was discarded as wastage per FDA guidelines  Consent on file.  Patient identify verified with 2 patient identifiers.    Injection sites were cleaned with alcohol and band aid was applied afterwards.  The patient tolerated the procedure well.  There were no complications.     MEDICATION USED:  200 units of botulinum toxin type A, mixed with 4 cc of sterile, preservative-free normal saline in a 4 separate 1 cc syringes.    Total units injected: 155 units  Total units discarded: 45 units    This patient has chronic migraines, defined as having 15 or more headaches days per month, 8 of which are migraines, over a minimum of the last three months. Episodes last more than 4 hours (untreated).  Pt has 2 or more of following (see initial note) -  Headache worsened with activity, pain is moderate to severe intensity, pulsing in nature, unilateral  and patient either has nausea/vomiting OR sensitivity to light and sound. This patient does not also have medication overuse headache.    No adverse effect of Botox noted at conclusion of today's appointment. Pt was instructed to seek immediate medical attention if fever, difficulty breathing, difficulty swallowing, or other concerning sxs arise. Counseled patient not to rub face, exercise, or use heat for at least 24 hours to prevent spreading of medication. Counseled that while every attempt possible is made to avoid minor and major blood vessels, mild bruising can occur due to injection and should resolve within 5-7 days.    RTC in 3 months for repeat injection, or sooner if concerns arise.     Dr. Elba Thomas DO, MS  Department of Physical Medicine & Rehabilitation  Neuro Rehabilitation Clinic  [facility]

## 2023-06-15 ENCOUNTER — OFFICE VISIT (OUTPATIENT)
Dept: NEUROLOGY | Facility: MEDICAL CENTER | Age: 38
End: 2023-06-15
Attending: PSYCHIATRY & NEUROLOGY
Payer: COMMERCIAL

## 2023-06-15 ENCOUNTER — TELEPHONE (OUTPATIENT)
Dept: NEUROLOGY | Facility: MEDICAL CENTER | Age: 38
End: 2023-06-15

## 2023-06-15 VITALS
WEIGHT: 160 LBS | OXYGEN SATURATION: 98 % | HEIGHT: 69 IN | SYSTOLIC BLOOD PRESSURE: 108 MMHG | RESPIRATION RATE: 14 BRPM | BODY MASS INDEX: 23.7 KG/M2 | DIASTOLIC BLOOD PRESSURE: 76 MMHG

## 2023-06-15 DIAGNOSIS — G40.909 NONINTRACTABLE EPILEPSY WITHOUT STATUS EPILEPTICUS, UNSPECIFIED EPILEPSY TYPE (HCC): ICD-10-CM

## 2023-06-15 PROCEDURE — 3074F SYST BP LT 130 MM HG: CPT | Performed by: PSYCHIATRY & NEUROLOGY

## 2023-06-15 PROCEDURE — 3078F DIAST BP <80 MM HG: CPT | Performed by: PSYCHIATRY & NEUROLOGY

## 2023-06-15 PROCEDURE — 99215 OFFICE O/P EST HI 40 MIN: CPT | Performed by: PSYCHIATRY & NEUROLOGY

## 2023-06-15 PROCEDURE — 99211 OFF/OP EST MAY X REQ PHY/QHP: CPT | Performed by: PSYCHIATRY & NEUROLOGY

## 2023-06-15 RX ORDER — LACOSAMIDE 100 MG/1
100 TABLET ORAL 2 TIMES DAILY
Qty: 180 TABLET | Refills: 3 | Status: SHIPPED | OUTPATIENT
Start: 2023-06-15 | End: 2023-09-13

## 2023-06-15 RX ORDER — ALPRAZOLAM 1 MG/1
1 TABLET ORAL 2 TIMES DAILY
COMMUNITY
Start: 2023-06-10

## 2023-06-15 RX ORDER — LACOSAMIDE 50 MG/1
TABLET ORAL 2 TIMES DAILY
COMMUNITY
End: 2023-06-15

## 2023-06-15 RX ORDER — LACOSAMIDE 100 MG/1
100 TABLET ORAL 2 TIMES DAILY
COMMUNITY
End: 2023-06-15 | Stop reason: SDUPTHER

## 2023-06-15 RX ORDER — LACOSAMIDE 10 MG/ML
SOLUTION ORAL
COMMUNITY
End: 2023-06-15

## 2023-06-15 ASSESSMENT — FIBROSIS 4 INDEX: FIB4 SCORE: 1.22

## 2023-06-15 NOTE — PROGRESS NOTES
"[facility] NEUROLOGY  GENERAL NEUROLOGY  FOLLOW-UP VISIT    CC: chronic migraine, focal aware seizures    INTERVAL HISTORY:  Roxanna Guzmán is a 37 y.o. woman with chronic migraine, seizure disorder, anxiety, and insomnia.  I last saw her in the clinic on 6/1/2021.  At that time I recommended holding Botox and continuation of lacosamide 200 mg daily.  Today, she was unaccompanied, and she provided the following interval history:    Migraines:  The following is a summary of headache symptoms, presented in my standard format:     Family History:   Age at onset:   Location: right fronto-temporal  Radiation: sometimes becomes bilateral  Frequency: baseline: 3-4 times/week, lately: 3-4/month  Duration: baseline: if rescue medications are helpful, then 2 hours; if not helpful, up to 1 day  Headache Days/Month:   Quality:   Intensity: baseline: 7-10/10, lately: 7/10  Aura: none  Photophobia/Phonophobia/Nausea/Vomiting: yes/yes/sometimes/no  Provoked by Physical Activity?:   Triggers: stress  Associated Symptoms:   Autonomic Signs (such as ptosis, miosis, conjunctival injection, rhinorrhea, increased lacrimation):   Head Trauma:   Association with Menses:   ED Visits:   Hospitalizations:   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     Medications Not Tried:  -     Seizures:  Roxanna has a history of seizures.  she suspects these started during childhood, but they were never " "worked up or treated.  There were probably none during college, and they returned during dental school.  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  Episodes are characterized by a possible aura of \"tightness\" throughout her body.  She would experience auditory hallucinations (childhood memories) and aphasia.  She would not lose awareness, but she could not respond to people.  Bystanders described a \"blankness\" that would come over her.  Each episode lasts ~60 seconds at a time.  Afterward there would be a period of confusion.  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  She saw a psychiatrist who diagnosed her with seizures.  EEG was reportedly normal but didn't capture a typical event.  She started lacosamide, and this was very effective.  The initial dosage was 200 mg BID.  Later, the dosage was reduced to 100 mg BID, and she tolerated this.    4/2023:  Roxanna suspects she had a seizure about ~2 months ago.  She was particularly stressed at that time.  She was trying to log-in to her office network remotely from home.  Her Internet was down at the office, and she was e-mailing IT when she experienced some minor language symptoms (difficulty typing an e-mail, some difficulty speaking).  There was \"pressure/tightness\" in the neck.  The entire event lasted 30-60 seconds.  There were no motor symptoms.  There was no alternation in awareness.  She did not fall.  There was no tongue biting or urinary incontinence.    Current Ant-Seizure Regimen:  - lacosamide: 100 mg BID, effective, no side effects    Prior Anti-Seizure Agents:  - oxcarbazepine OR carbamazepine: ineffective    MEDICATIONS:  Current Outpatient Medications   Medication Sig    ALPRAZolam (XANAX) 1 MG Tab TAKE 1/2 TO 1 TABLET BY MOUTH TWICE A DAY AS NEEDED FOR ANXIETY AND INSOMNIA    lacosamide (VIMPAT) 100 MG Tab tablet Take 1 Tablet by mouth 2 times a day for 90 days.    rizatriptan " "(MAXALT-MLT) 10 MG disintegrating tablet TAKE 1 TABLET BY MOUTH AT ONSET OF HEADACHE, MAY REPEAT DOSE IN 2 HOURS IF UNRELIEVED. DO NOT EXCEED MORE THAN 2 TABLETS IN 24 HOURS.    ibuprofen (MOTRIN) 800 MG Tab Take 800 mg by mouth every 8 hours as needed.     MEDICAL, SOCIAL, AND FAMILY HISTORY:  There is no change in the patient's ROS or medical, social, or family histories since the previous visit on 6/1/2021.    REVIEW OF SYSTEMS:  A ROS was completed.  Pertinent positives and negatives were included in the HPI, above.  All other systems were reviewed and are negative.    PHYSICAL EXAM:  General/Medical:  - NAD    Neuro:  MENTAL STATUS: awake and alert; no deficits of speech or language; oriented to conversation; affect was appropriate to situation; pleasant, cooperative    CRANIAL NERVES:    II: acuity: NT, fields: NT, pupils: NT, discs: NT    III/IV/VI: versions: grossly intact    V: facial sensation: NT    VII: facial expression: symmetric    VIII: hearing: intact to voice    IX/X: palate: NT    XI: shoulder shrug: NT    XII: tongue: NT    MOTOR:  - bulk: NT  - tone: NT  Upper Extremity Strength (R/L)    NT   Elbow flexion NT   Elbow extension NT   Shoulder abduction NT     Lower Extremity Strength (R/L)   Hip flexion NT   Knee extension NT   Knee flexion NT   Ankle plantarflexion NT   Ankle dorsiflexion NT     - pronator drift: NT  - abnormal movements: none    SENSATION:  - light touch: NT  - vibration (R/L, seconds): NT at the great toes  - pinprick: NT  - proprioception: NT  - Romberg: NT    COORDINATION:  - finger to nose: NT  - finger tapping: NT    REFLEXES:  Reflex Right Left   BR NT NT   Biceps NT NT   Triceps NT NT   Patellae NT NT   Achilles NT NT   Toes NT NT     GAIT:  - NT    REVIEW OF IMAGING STUDIES: I reviewed the following studies:  MRI Cervical Spine:  Date: 12/1/2022  W/o and w/ contrast?: without  Indication: \"neck pain, hyper-reflexia...\"  Comparison: ?  Impression:  \"Mild degenerative " "disease in the cervical spine as described above.\"    MRI Thoracic Spine:  Date: 12/1/2022  W/o and w/ contrast?: without  Indication: \"motor neuron disease suspected...\"  Comparison: ?  Impression:  \"MRI of the thoracic spine without contrast within normal limits except for mild exaggerated thoracic kyphosis. There is no abnormal signal change in the spinal cord.\"    REVIEW OF LABORATORY STUDIES:  No recent data available.    ASSESSMENT:  Roxanna Guzmán is a 37 y.o. woman with chronic migraine, seizure disorder, anxiety, and insomnia.  Plans/recommendations as follows:    PLAN:  Chronic Migraine w/ Aura:  Prevention:  - continue Botox  - get 7-9 hours of sleep per night; can try supplementing melatonin 2-10 mg, 2-3 hours before bedtime  - drink plenty of fluids (urine should be nearly clear)  - avoid excessive caffeine intake (no more than 2 servings per day and nothing in the afternoon)  - eat regular meals (don't skip meals)  - get moderate exercise (even just a 20 minute walk daily)    Rescue:  - ok to continue rizatriptan:  - trial of Nurtec 75 mg (samples provided today): take 1 tablet (75 mg) at the onset of aura/headache; limit 1 tablet (75 mg) in 24 hours; try to limit Nurtec to 1 tablet every 48 hours; do not dose Nurtec and any triptan within 24-hours of one another  - do not use analgesics (e.g., ibuprofen, acetaminophen) more than 2 days per week in order to avoid analgesic rebound headaches    - keep a headache log    Epilepsy:  - continue lacosamide 100 mg BID    Follow-Up:  - Return in about 1 year (around 6/15/2024).    Signed: Alvaro Leroy M.D.    BILLING DOCUMENTATION:   I spent 53 minutes reviewing the medical record, interviewing and examining the patient, discussing my impression (see \"assessment\" above), and coordinating care.  "

## 2023-06-15 NOTE — TELEPHONE ENCOUNTER
Lacosamide (Vimpat) 100 MG Tabs    Request rec'd via [employer], RTS PEGGY Kruger paid copay $30.00 #180/90 DS notifying liaison Kim Joseph. - 06/15/2023 12:04pm

## 2023-06-23 ENCOUNTER — HOSPITAL ENCOUNTER (OUTPATIENT)
Dept: LAB | Facility: MEDICAL CENTER | Age: 38
End: 2023-06-23
Attending: OBSTETRICS & GYNECOLOGY
Payer: COMMERCIAL

## 2023-06-23 LAB
ANION GAP SERPL CALC-SCNC: 11 MMOL/L (ref 7–16)
BASOPHILS # BLD AUTO: 0.8 % (ref 0–1.8)
BASOPHILS # BLD: 0.04 K/UL (ref 0–0.12)
BUN SERPL-MCNC: 18 MG/DL (ref 8–22)
CALCIUM SERPL-MCNC: 9.3 MG/DL (ref 8.5–10.5)
CHLORIDE SERPL-SCNC: 105 MMOL/L (ref 96–112)
CHOLEST SERPL-MCNC: 185 MG/DL (ref 100–199)
CO2 SERPL-SCNC: 25 MMOL/L (ref 20–33)
CREAT SERPL-MCNC: 0.75 MG/DL (ref 0.5–1.4)
EOSINOPHIL # BLD AUTO: 0.08 K/UL (ref 0–0.51)
EOSINOPHIL NFR BLD: 1.6 % (ref 0–6.9)
ERYTHROCYTE [DISTWIDTH] IN BLOOD BY AUTOMATED COUNT: 41.2 FL (ref 35.9–50)
EST. AVERAGE GLUCOSE BLD GHB EST-MCNC: 97 MG/DL
GFR SERPLBLD CREATININE-BSD FMLA CKD-EPI: 104 ML/MIN/1.73 M 2
GLUCOSE SERPL-MCNC: 85 MG/DL (ref 65–99)
HBA1C MFR BLD: 5 % (ref 4–5.6)
HCT VFR BLD AUTO: 42.9 % (ref 37–47)
HDLC SERPL-MCNC: 52 MG/DL
HGB BLD-MCNC: 14.4 G/DL (ref 12–16)
IMM GRANULOCYTES # BLD AUTO: 0.01 K/UL (ref 0–0.11)
IMM GRANULOCYTES NFR BLD AUTO: 0.2 % (ref 0–0.9)
LDLC SERPL CALC-MCNC: 121 MG/DL
LYMPHOCYTES # BLD AUTO: 1.69 K/UL (ref 1–4.8)
LYMPHOCYTES NFR BLD: 33.9 % (ref 22–41)
MCH RBC QN AUTO: 29.7 PG (ref 27–33)
MCHC RBC AUTO-ENTMCNC: 33.6 G/DL (ref 32.2–35.5)
MCV RBC AUTO: 88.5 FL (ref 81.4–97.8)
MONOCYTES # BLD AUTO: 0.34 K/UL (ref 0–0.85)
MONOCYTES NFR BLD AUTO: 6.8 % (ref 0–13.4)
NEUTROPHILS # BLD AUTO: 2.82 K/UL (ref 1.82–7.42)
NEUTROPHILS NFR BLD: 56.7 % (ref 44–72)
NRBC # BLD AUTO: 0 K/UL
NRBC BLD-RTO: 0 /100 WBC (ref 0–0.2)
PLATELET # BLD AUTO: 288 K/UL (ref 164–446)
PMV BLD AUTO: 9.9 FL (ref 9–12.9)
POTASSIUM SERPL-SCNC: 4.1 MMOL/L (ref 3.6–5.5)
RBC # BLD AUTO: 4.85 M/UL (ref 4.2–5.4)
SODIUM SERPL-SCNC: 141 MMOL/L (ref 135–145)
TRIGL SERPL-MCNC: 59 MG/DL (ref 0–149)
WBC # BLD AUTO: 5 K/UL (ref 4.8–10.8)

## 2023-06-23 PROCEDURE — 85025 COMPLETE CBC W/AUTO DIFF WBC: CPT

## 2023-06-23 PROCEDURE — 36415 COLL VENOUS BLD VENIPUNCTURE: CPT

## 2023-06-23 PROCEDURE — 83036 HEMOGLOBIN GLYCOSYLATED A1C: CPT

## 2023-06-23 PROCEDURE — 80048 BASIC METABOLIC PNL TOTAL CA: CPT

## 2023-06-23 PROCEDURE — 80061 LIPID PANEL: CPT

## 2023-10-03 ENCOUNTER — OFFICE VISIT (OUTPATIENT)
Dept: NEUROLOGY | Facility: MEDICAL CENTER | Age: 38
End: 2023-10-03
Attending: PSYCHIATRY & NEUROLOGY
Payer: COMMERCIAL

## 2023-10-03 VITALS
BODY MASS INDEX: 24.75 KG/M2 | SYSTOLIC BLOOD PRESSURE: 104 MMHG | OXYGEN SATURATION: 97 % | DIASTOLIC BLOOD PRESSURE: 66 MMHG | WEIGHT: 167.11 LBS | TEMPERATURE: 98.1 F | HEART RATE: 96 BPM | HEIGHT: 69 IN

## 2023-10-03 DIAGNOSIS — G43.701 CHRONIC MIGRAINE WITHOUT AURA WITH STATUS MIGRAINOSUS, NOT INTRACTABLE: ICD-10-CM

## 2023-10-03 DIAGNOSIS — G43.E09 CHRONIC MIGRAINE WITH AURA WITHOUT STATUS MIGRAINOSUS, NOT INTRACTABLE: ICD-10-CM

## 2023-10-03 PROCEDURE — 3078F DIAST BP <80 MM HG: CPT | Performed by: PSYCHIATRY & NEUROLOGY

## 2023-10-03 PROCEDURE — 3074F SYST BP LT 130 MM HG: CPT | Performed by: PSYCHIATRY & NEUROLOGY

## 2023-10-03 PROCEDURE — 64615 CHEMODENERV MUSC MIGRAINE: CPT | Performed by: PSYCHIATRY & NEUROLOGY

## 2023-10-03 PROCEDURE — 700101 HCHG RX REV CODE 250: Performed by: PSYCHIATRY & NEUROLOGY

## 2023-10-03 PROCEDURE — 700111 HCHG RX REV CODE 636 W/ 250 OVERRIDE (IP): Mod: JZ | Performed by: PSYCHIATRY & NEUROLOGY

## 2023-10-03 RX ORDER — LACOSAMIDE 100 MG/1
100 TABLET ORAL 2 TIMES DAILY
COMMUNITY
End: 2024-02-20

## 2023-10-03 RX ORDER — ZOLPIDEM TARTRATE 10 MG/1
10 TABLET ORAL NIGHTLY
COMMUNITY

## 2023-10-03 RX ADMIN — SODIUM CHLORIDE 155 UNITS: 9 INJECTION INTRAMUSCULAR; INTRAVENOUS; SUBCUTANEOUS at 08:29

## 2023-10-03 ASSESSMENT — FIBROSIS 4 INDEX: FIB4 SCORE: 0.84

## 2023-12-26 DIAGNOSIS — G43.E09 CHRONIC MIGRAINE WITH AURA WITHOUT STATUS MIGRAINOSUS, NOT INTRACTABLE: ICD-10-CM

## 2023-12-26 RX ORDER — RIMEGEPANT SULFATE 75 MG/75MG
1 TABLET, ORALLY DISINTEGRATING ORAL PRN
Qty: 12 TABLET | Refills: 5 | Status: SHIPPED | OUTPATIENT
Start: 2023-12-26 | End: 2024-01-15

## 2023-12-27 ENCOUNTER — TELEPHONE (OUTPATIENT)
Dept: NEUROLOGY | Facility: MEDICAL CENTER | Age: 38
End: 2023-12-27

## 2023-12-27 NOTE — TELEPHONE ENCOUNTER
Received [employer] PA request via [employer]  for Nurtec 75mg tab. (Quantity:12 tab, Day Supply:30)     Insurance: [employer]  Member ID:  [id number]  BIN: [id number]  PCN: [id number]  Group: [id number]     Ran Test claim via [place] & medication Rejects stating prior authorization is required.

## 2024-01-10 ENCOUNTER — TELEPHONE (OUTPATIENT)
Dept: NEUROLOGY | Facility: MEDICAL CENTER | Age: 39
End: 2024-01-10
Payer: COMMERCIAL

## 2024-01-10 NOTE — TELEPHONE ENCOUNTER
Pt needs botox appt scheduled. Needs an early visit on Monday-Wednesday, or has more flexibility on Thursday or Friday.

## 2024-01-15 ENCOUNTER — OFFICE VISIT (OUTPATIENT)
Dept: NEUROLOGY | Facility: MEDICAL CENTER | Age: 39
End: 2024-01-15
Attending: PSYCHIATRY & NEUROLOGY
Payer: COMMERCIAL

## 2024-01-15 VITALS
HEIGHT: 69 IN | SYSTOLIC BLOOD PRESSURE: 108 MMHG | HEART RATE: 86 BPM | BODY MASS INDEX: 24.23 KG/M2 | WEIGHT: 163.58 LBS | DIASTOLIC BLOOD PRESSURE: 64 MMHG | OXYGEN SATURATION: 98 % | TEMPERATURE: 97.4 F

## 2024-01-15 DIAGNOSIS — G40.909 SEIZURE DISORDER (HCC): ICD-10-CM

## 2024-01-15 DIAGNOSIS — G43.E09 CHRONIC MIGRAINE WITH AURA WITHOUT STATUS MIGRAINOSUS, NOT INTRACTABLE: Primary | ICD-10-CM

## 2024-01-15 DIAGNOSIS — G12.20 MOTOR NEURON DISEASE (HCC): ICD-10-CM

## 2024-01-15 DIAGNOSIS — M54.17 LUMBOSACRAL RADICULOPATHY: ICD-10-CM

## 2024-01-15 PROCEDURE — 700101 HCHG RX REV CODE 250: Performed by: PSYCHIATRY & NEUROLOGY

## 2024-01-15 PROCEDURE — 700111 HCHG RX REV CODE 636 W/ 250 OVERRIDE (IP): Mod: JZ | Performed by: PSYCHIATRY & NEUROLOGY

## 2024-01-15 PROCEDURE — 64615 CHEMODENERV MUSC MIGRAINE: CPT | Performed by: PSYCHIATRY & NEUROLOGY

## 2024-01-15 PROCEDURE — 3074F SYST BP LT 130 MM HG: CPT | Performed by: PSYCHIATRY & NEUROLOGY

## 2024-01-15 PROCEDURE — 3078F DIAST BP <80 MM HG: CPT | Performed by: PSYCHIATRY & NEUROLOGY

## 2024-01-15 RX ORDER — CLONAZEPAM 1 MG/1
1 TABLET ORAL
COMMUNITY
Start: 2023-12-31

## 2024-01-15 RX ADMIN — SODIUM CHLORIDE 155 UNITS: 9 INJECTION, SOLUTION INTRAMUSCULAR; INTRAVENOUS; SUBCUTANEOUS at 07:34

## 2024-01-15 NOTE — PROCEDURES
[facility] Neurosciences    PROCEDURE NOTE    Procedure: Botulinum Injection  Primary Dx:   1. Chronic migraine with aura without status migrainosus, not intractable  onabotulinum toxin A (Botox) injection 155 Units      2. Seizure disorder (HCC)        3. Lumbosacral radiculopathy due to L4-5 disc herniation with foraminal stenosis          SUBJECTIVE:  Botox has been successful in reducing the intensity and frequency of migraines.  Denies recent illnesses or use of antibiotics.  Denies any significant negative side effects from the injections.     OBJECTIVE:  Vitals:    01/15/24 0731   BP: 108/64   Pulse: 86   Temp: 36.3 °C (97.4 °F)   SpO2: 98%       ASSESSMENT&PLAN:  Botox therapy has reduced patient’s migraines by more than 7 days and/or 100 hours per month.  Although this patient is responding to botox, the patient is  NOT migraine free, however, and it is my recommendation Botox be continued at this time.  Frequency of headaches is >15 days monthly with at least 8 migraines monthly; Migraines include at least two of the following: worsened with activity or avoidance of activity with migraines (ie they go lie down), moderate to severe pain intensity, pulsing headache, unilateral headache and has  have either nausea or vomiting OR sensitivity to light and sound.    INFORMED CONSENT   The risks and benefits of the procedure were explained to the patient, and all questions were answered. Benefits of the injection include spasticity reduction by decrease in muscle activation following toxin injection. Adverse effects from toxin injection include but are not limited to: excessive weakness, infection, breathing and/or swallowing difficulty.  Common adverse effects from the injection itself are pain and bruising. The patient demonstrated good understanding of risks and benefits and consents to botulinum toxin injection.     Pre-procedure time out was performed.     PROCEDURE:  After clean  preparation of skin using an alcohol swab, a needle was inserted into the muscle(s) listed below.         10 units divided in 2 sites   Procerus  5 units in 1 site   Frontalis  20 units divided in 4 sites   Temporalis  40 units divided into 8 sites   Occipitalis  30 units divided into 6 sites   Cervical paraspinals  20 units divided into 4 sites   Trapezius 30 units divided into 6 sites     I treated this patient in clinic today with BotoxA 155 units according to the dosing/injection paradigm currently mandated by the FDA for the management of chronic migraine. Specifically, I injected 5 units to the procerus, 5 units to the corrugators bilaterally, a total of 20 units to the frontalis musculature, 20 units to the temporalis bilaterally, 15 units to the occipitalis bilaterally, 10 units to the cervical paraspinals bilaterally and 15 units to the trapezius musculature bilaterally. The remainder of the Botox 200 units mixed but not administered was discarded as wastage per FDA guidelines  Consent on file.  Patient identify verified with 2 patient identifiers.    Injection sites were cleaned with alcohol and band aid was applied afterwards.  The patient tolerated the procedure well.  There were no complications.     MEDICATION USED:  200 units of botulinum toxin type A, mixed with 4 cc of sterile, preservative-free normal saline in a 4 separate 1 cc syringes.    Total units injected: 155 units  Total units discarded: 45 units    This patient has chronic migraines, defined as having 15 or more headaches days per month, 8 of which are migraines, over a minimum of the last three months. Episodes last more than 4 hours (untreated).  Pt has 2 or more of following (see initial note) -  Headache worsened with activity, pain is moderate to severe intensity, pulsing in nature, unilateral and patient either has nausea/vomiting OR sensitivity to light and sound. This patient does not also have medication overuse  headache.    No adverse effect of Botox noted at conclusion of today's appointment. Pt was instructed to seek immediate medical attention if fever, difficulty breathing, difficulty swallowing, or other concerning sxs arise. Counseled patient not to rub face, exercise, or use heat for at least 24 hours to prevent spreading of medication. Counseled that while every attempt possible is made to avoid minor and major blood vessels, mild bruising can occur due to injection and should resolve within 5-7 days.    RTC in 3 months for repeat injection, or sooner if concerns arise.     Issa Walls MD  Neurology Attending, Headache Program  [facility]

## 2024-02-12 ENCOUNTER — PATIENT MESSAGE (OUTPATIENT)
Dept: NEUROLOGY | Facility: MEDICAL CENTER | Age: 39
End: 2024-02-12
Payer: COMMERCIAL

## 2024-02-12 DIAGNOSIS — G43.E09 CHRONIC MIGRAINE WITH AURA WITHOUT STATUS MIGRAINOSUS, NOT INTRACTABLE: Primary | ICD-10-CM

## 2024-02-13 ENCOUNTER — TELEPHONE (OUTPATIENT)
Dept: NEUROLOGY | Facility: MEDICAL CENTER | Age: 39
End: 2024-02-13
Payer: COMMERCIAL

## 2024-02-13 RX ORDER — RIMEGEPANT SULFATE 75 MG/75MG
75 TABLET, ORALLY DISINTEGRATING ORAL
Qty: 8 TABLET | Refills: 11 | Status: SHIPPED | OUTPATIENT
Start: 2024-02-13 | End: 2024-03-14

## 2024-02-15 ENCOUNTER — TELEPHONE (OUTPATIENT)
Dept: PHARMACY | Facility: MEDICAL CENTER | Age: 39
End: 2024-02-15
Payer: COMMERCIAL

## 2024-02-15 DIAGNOSIS — G40.909 NONINTRACTABLE EPILEPSY WITHOUT STATUS EPILEPTICUS, UNSPECIFIED EPILEPSY TYPE (HCC): ICD-10-CM

## 2024-02-15 NOTE — TELEPHONE ENCOUNTER
3 call Attempts to contact patient at [phone] to discuss [facility] and services/benefits offered. No answer, left voicemail.     Sarah Hernandez  Rx Coordinator   [phone]

## 2024-02-20 ENCOUNTER — TELEPHONE (OUTPATIENT)
Dept: NEUROLOGY | Facility: MEDICAL CENTER | Age: 39
End: 2024-02-20
Payer: COMMERCIAL

## 2024-02-20 RX ORDER — LACOSAMIDE 100 MG/1
100 TABLET ORAL 2 TIMES DAILY
Qty: 180 TABLET | Refills: 1 | Status: SHIPPED | OUTPATIENT
Start: 2024-02-20 | End: 2024-08-18

## 2024-02-20 NOTE — TELEPHONE ENCOUNTER
Received Refill PA request via [facility]  for lacosamide (VIMPAT) 100 MG tablet. (Quantity:180, Day Supply:90)     Insurance: [employer]  Member ID:  [id number]  BIN: [id number]  PCN: [facility]  Group: [id number]     Ran Test claim via [place] & medication Pays for a $15.00 copay. Will outreach to patient to offer specialty pharmacy services and or release to preferred pharmacy

## 2024-02-20 NOTE — TELEPHONE ENCOUNTER
Received request via: Pharmacy    Medication Name/Dosage lacosamide (VIMPAT) 100 MG Tab tablet     When was medication last prescribed 06/15/2023    How many refills were previously provided 3    How many Refills does he patient have left from last prescription 0    Was the patient seen in the last year in this department? Yes   Date of last office visit 01/15/2024     Per last Neurology Office Visit, when was the date of next follow up visit set for?                            Date of office visit follow up request 3 months      Does the patient have an upcoming appointment? Yes   If yes, when 04/16/2024             If no, schedule appointment N/A     Does the patient have FDC Plus and need 100 day supply (blood pressure, diabetes and cholesterol meds only)? Patient does not have SCP

## 2024-03-15 ENCOUNTER — HOSPITAL ENCOUNTER (EMERGENCY)
Facility: MEDICAL CENTER | Age: 39
End: 2024-03-15
Attending: STUDENT IN AN ORGANIZED HEALTH CARE EDUCATION/TRAINING PROGRAM
Payer: COMMERCIAL

## 2024-03-15 VITALS
SYSTOLIC BLOOD PRESSURE: 127 MMHG | BODY MASS INDEX: 22.66 KG/M2 | DIASTOLIC BLOOD PRESSURE: 79 MMHG | HEIGHT: 69 IN | HEART RATE: 81 BPM | WEIGHT: 153 LBS | RESPIRATION RATE: 20 BRPM | OXYGEN SATURATION: 98 % | TEMPERATURE: 98.1 F

## 2024-03-15 DIAGNOSIS — T50.902A INTENTIONAL DRUG OVERDOSE, INITIAL ENCOUNTER (HCC): ICD-10-CM

## 2024-03-15 DIAGNOSIS — R45.851 SUICIDAL IDEATION: ICD-10-CM

## 2024-03-15 LAB
ALBUMIN SERPL BCP-MCNC: 4.4 G/DL (ref 3.2–4.9)
ALBUMIN/GLOB SERPL: 2.1 G/DL
ALP SERPL-CCNC: 49 U/L (ref 30–99)
ALT SERPL-CCNC: 15 U/L (ref 2–50)
AMPHET UR QL SCN: NEGATIVE
ANION GAP SERPL CALC-SCNC: 8 MMOL/L (ref 7–16)
AST SERPL-CCNC: 14 U/L (ref 12–45)
BARBITURATES UR QL SCN: NEGATIVE
BENZODIAZ UR QL SCN: POSITIVE
BILIRUB SERPL-MCNC: 0.8 MG/DL (ref 0.1–1.5)
BUN SERPL-MCNC: 11 MG/DL (ref 8–22)
BZE UR QL SCN: NEGATIVE
CALCIUM ALBUM COR SERPL-MCNC: 8.4 MG/DL (ref 8.5–10.5)
CALCIUM SERPL-MCNC: 8.7 MG/DL (ref 8.4–10.2)
CANNABINOIDS UR QL SCN: NEGATIVE
CHLORIDE SERPL-SCNC: 104 MMOL/L (ref 96–112)
CO2 SERPL-SCNC: 28 MMOL/L (ref 20–33)
CREAT SERPL-MCNC: 0.84 MG/DL (ref 0.5–1.4)
EKG IMPRESSION: NORMAL
ETHANOL BLD-MCNC: <10.1 MG/DL
FENTANYL UR QL: NEGATIVE
GFR SERPLBLD CREATININE-BSD FMLA CKD-EPI: 90 ML/MIN/1.73 M 2
GLOBULIN SER CALC-MCNC: 2.1 G/DL (ref 1.9–3.5)
GLUCOSE SERPL-MCNC: 90 MG/DL (ref 65–99)
HCG SERPL QL: NEGATIVE
METHADONE UR QL SCN: NEGATIVE
OPIATES UR QL SCN: NEGATIVE
OXYCODONE UR QL SCN: POSITIVE
PCP UR QL SCN: NEGATIVE
POC BREATHALIZER: 0 PERCENT (ref 0–0.01)
POTASSIUM SERPL-SCNC: 3.7 MMOL/L (ref 3.6–5.5)
PROPOXYPH UR QL SCN: NEGATIVE
PROT SERPL-MCNC: 6.5 G/DL (ref 6–8.2)
SALICYLATES SERPL-MCNC: <1 MG/DL (ref 15–25)
SODIUM SERPL-SCNC: 140 MMOL/L (ref 135–145)

## 2024-03-15 PROCEDURE — 93005 ELECTROCARDIOGRAM TRACING: CPT | Performed by: STUDENT IN AN ORGANIZED HEALTH CARE EDUCATION/TRAINING PROGRAM

## 2024-03-15 PROCEDURE — 36415 COLL VENOUS BLD VENIPUNCTURE: CPT

## 2024-03-15 PROCEDURE — 302970 POC BREATHALIZER: Performed by: STUDENT IN AN ORGANIZED HEALTH CARE EDUCATION/TRAINING PROGRAM

## 2024-03-15 PROCEDURE — 84703 CHORIONIC GONADOTROPIN ASSAY: CPT

## 2024-03-15 PROCEDURE — 80053 COMPREHEN METABOLIC PANEL: CPT

## 2024-03-15 PROCEDURE — 99285 EMERGENCY DEPT VISIT HI MDM: CPT

## 2024-03-15 PROCEDURE — 82077 ASSAY SPEC XCP UR&BREATH IA: CPT

## 2024-03-15 PROCEDURE — 80307 DRUG TEST PRSMV CHEM ANLYZR: CPT

## 2024-03-15 PROCEDURE — 80179 DRUG ASSAY SALICYLATE: CPT

## 2024-03-15 RX ORDER — ACETAMINOPHEN 500 MG
1000 TABLET ORAL EVERY 6 HOURS PRN
COMMUNITY

## 2024-03-15 RX ORDER — NORETHINDRONE 0.35 MG/1
1 TABLET ORAL EVERY EVENING
COMMUNITY
Start: 2024-02-13

## 2024-03-15 RX ORDER — ZOLPIDEM TARTRATE 5 MG/1
10 TABLET ORAL NIGHTLY PRN
Status: DISCONTINUED | OUTPATIENT
Start: 2024-03-15 | End: 2024-03-15 | Stop reason: HOSPADM

## 2024-03-15 RX ORDER — ACETAMINOPHEN 325 MG/1
650 TABLET ORAL ONCE
Status: DISCONTINUED | OUTPATIENT
Start: 2024-03-15 | End: 2024-03-15 | Stop reason: HOSPADM

## 2024-03-15 RX ORDER — RIMEGEPANT SULFATE 75 MG/75MG
75 TABLET, ORALLY DISINTEGRATING ORAL PRN
COMMUNITY

## 2024-03-15 RX ORDER — RIZATRIPTAN BENZOATE 10 MG/1
10 TABLET ORAL
COMMUNITY

## 2024-03-15 NOTE — ED NOTES
Maintained 1:1 sitter for observation and pt's safety. Pt sleeping comfortably not in any form of distress.

## 2024-03-15 NOTE — ED TRIAGE NOTES
".  Chief Complaint   Patient presents with    Drug Overdose     PT was brought by EMS for ? Drug over dose like taking extra dose of xanax and midazolam 20mg. Pt has attempted to harm herself. By means of medication. No signs of trauma,(-) HI (-) AH & (-) VH.   .Temp 36.9 °C (98.4 °F) (Temporal)   Ht 1.753 m (5' 9\")   Wt 69.4 kg (153 lb)   SpO2 99%   BMI 22.59 kg/m²     "

## 2024-03-15 NOTE — ED NOTES
Phone provided to pt by er charge per pt request and erp approval. Remains awake, sitter . Aware of pending transfer to inpt psych

## 2024-03-15 NOTE — ED NOTES
Maintained 1:1 sitter for observation and patient safety. Pt sleeping not in any form of distress.

## 2024-03-15 NOTE — ED NOTES
[facility] here for pt . Spouse remains. Pt refusing transfer to [facility] per [facility] . Update to erp

## 2024-03-15 NOTE — ED NOTES
Pharmacy Medication Reconciliation      ~Medication reconciliation updated and complete per patient at bedside   ~Allergies have been verified   ~No oral ABX within the last 30 days  ~Patient home pharmacy :  [facility]      ~Anticoagulants (rivaroxaban, apixaban, edoxaban, dabigatran, warfarin, enoxaparin) taken in the last 14 days? No

## 2024-03-15 NOTE — ED NOTES
Maintained 1:1 sitter for observation and pt's safety. Pt sleeping comfortably. Kept undisturbed. Properties was checked by security.

## 2024-03-15 NOTE — DISCHARGE SUMMARY
"  ED Observation Discharge Summary    Patient:Roxanna Guzmán  Patient : 1985  Patient MRN: [id number]  Patient PCP: Catalina Huff D.O.    Admit Date: 3/15/2024  Discharge Date and Time: 03/15/24 11:19 AM  Discharge Diagnosis:   1. Intentional drug overdose, initial encounter (HCC)        2. Suicidal ideation        Discharge Attending: Mario Adhikari M.D.  Discharge Service: ED Observation    ED Course  Roxanna is a 38 y.o. female who was evaluated at [facility] emergency department after an intentional overdose in a suicide attempt.  Patient was initially placed on a legal hold by initial ERP and was medically cleared.  I certified the legal hold.  I had an extensive discussion with the patient and her  at bedside regarding plan for transfer to [facility] with the patient initially attempted to decline.  I spoke with our psychiatry team to determine if there are other options and unfortunately the patient does not have the option to decline transport to an accepting psychiatric facility.  The patient is at high risk for additional suicide attempt.  I am unwilling to lift a legal hold.  Psychiatry team will not see the patient in the ER because she has an accepting facility.  I explained this to both the patient and her  and they were understandably quite upset.  Regardless, given her currently limited options transport to inpatient psychiatric hospital is the best option at this point.  She was ultimately transferred to [facility].    Discharge Exam:  /66   Pulse 69   Temp 36.8 °C (98.2 °F) (Temporal)   Resp 19   Ht 1.753 m (5' 9\")   Wt 69.4 kg (153 lb)   SpO2 98%   BMI 22.59 kg/m² .    Constitutional: Awake and alert. Nontoxic  HENT:  Grossly normal  Eyes: Grossly normal  Neck: Normal range of motion  Cardiovascular: Normal heart rate   Thorax & Lungs: No respiratory distress  Skin:  No pathologic rash.   Psychiatric: Tearful.  " Frustrated.    Labs  Results for orders placed or performed during the hospital encounter of 03/15/24   Comp Metabolic Panel   Result Value Ref Range    Sodium 140 135 - 145 mmol/L    Potassium 3.7 3.6 - 5.5 mmol/L    Chloride 104 96 - 112 mmol/L    Co2 28 20 - 33 mmol/L    Anion Gap 8.0 7.0 - 16.0    Glucose 90 65 - 99 mg/dL    Bun 11 8 - 22 mg/dL    Creatinine 0.84 0.50 - 1.40 mg/dL    Calcium 8.7 8.4 - 10.2 mg/dL    Correct Calcium 8.4 (L) 8.5 - 10.5 mg/dL    AST(SGOT) 14 12 - 45 U/L    ALT(SGPT) 15 2 - 50 U/L    Alkaline Phosphatase 49 30 - 99 U/L    Total Bilirubin 0.8 0.1 - 1.5 mg/dL    Albumin 4.4 3.2 - 4.9 g/dL    Total Protein 6.5 6.0 - 8.2 g/dL    Globulin 2.1 1.9 - 3.5 g/dL    A-G Ratio 2.1 g/dL   BETA-HCG QUALITATIVE SERUM   Result Value Ref Range    Beta-Hcg Qualitative Serum Negative Negative   URINE DRUG SCREEN   Result Value Ref Range    Amphetamines Urine Negative Negative    Barbiturates Negative Negative    Benzodiazepines Positive (A) Negative    Cocaine Metabolite Negative Negative    Fentanyl, Urine Negative Negative    Methadone Negative Negative    Opiates Negative Negative    Oxycodone Positive (A) Negative    Phencyclidine -Pcp Negative Negative    Propoxyphene Negative Negative    Cannabinoid Metab Negative Negative   Salicylate   Result Value Ref Range    Salicylates, Quant. <1.0 (L) 15.0 - 25.0 mg/dL   ETHYL ALCOHOL (BLOOD)   Result Value Ref Range    Diagnostic Alcohol <10.1 <10.1 mg/dL   ESTIMATED GFR   Result Value Ref Range    GFR (CKD-EPI) 90 >60 mL/min/1.73 m 2   POC BREATHALIZER   Result Value Ref Range    POC Breathalizer 0.00 0.00 - 0.01 Percent   EKG   Result Value Ref Range    Report       [facility]    Test Date:  2024-03-15  Pt Name:    MURIEL AZAR                   Department: [facility]  MRN:        [id number]                      [facility]  Gender:     Female                       Technician: [id number]  :        1985                    Requested By:CLAUDINE TAM  Order #:    [id number]                    Katrin MD:    Measurements  Intervals                                Axis  Rate:       64                           P:          17  MO:         136                          QRS:        43  QRSD:       104                          T:          29  QT:         408  QTc:        421    Interpretive Statements  Sinus rhythm  Compared to ECG 02/22/2021 10:32:28  Sinus tachycardia no longer present  ST (T wave) deviation no longer present         Radiology  No orders to display       Medications:   New Prescriptions    No medications on file       My final assessment includes review of prior records, extensive discussion with the patient and her , discussion with alert team personnel, and preparation of the medical record.  Upon Reevaluation, the patient's condition has: not improved; and will be escalated to hospitalization.    Patient discharged from ED Observation status at 11:15 AM (Time) 3/15/2024 (Date).     Total time spent on this ED Observation discharge encounter is > 30 Minutes    Electronically signed by: Mario Adhikari M.D., 3/15/2024 11:19 AM

## 2024-03-15 NOTE — DISCHARGE PLANNING
Alert Team/Behavioral Health   Note:      Mental Health Transfer    Referral: transfer to Mental Health Facility    Intervention: Intake Counselor (Yesenia) @ [facility] called to accept patient.    Patient's accepting provider is Sienna COLUNGA.    Transport arranged through  at [facility].    The patient will be picked up @ 1100.    Notified Attending Provider (Onofre PHAM), Bedside RN (Kaya BIANCHI), Unit Clerk (Bea CHAMBERS), and Alert Team RN (Petrona THOMAS) via Voalte message of the departure time as well as accepting facility.    [facility] .    Transfer packet to be created and placed in chart by [facility] staff.    Plan: transfer to [facility] via [facility] ambulance for acute inpatient psychiatric care.

## 2024-03-15 NOTE — ED NOTES
Spouse brought back by registration-returned to lobby pending erp approval for visitors. Rachael broderick

## 2024-03-15 NOTE — ED PROVIDER NOTES
CHIEF COMPLAINT  Chief Complaint   Patient presents with    Drug Overdose     PT was brought by EMS for ? Drug over dose like taking extra dose of xanax and midazolam 20mg. Pt has attempted to harm herself. By means of medication. No signs of trauma,(-) HI (-) AH & (-) VH.    Suicidal Ideation       LIMITATION TO HISTORY   Select:     HPI    Roxanna Guzmán is a 38 y.o. female who presents to the Emergency Department for evaluation after suicide attempt patient works as a dentist she had a stressful day at work she got a hotel room this evening took her prescribed Xanax extra dose and took 20 mg of liquid midazolam and she reports 150 mg of meperidine that she took from her dentist office she tried to inject intravenously but she suspects this was subcutaneous injection, she turned her phone off and her  called 911 and she arrives by EMS.  She reports a longstanding history of depression prior suicide attempts none recently other than her work she denies any other acute stressors she reports she sees a psychiatrist and therapist but that none of the typical antidepressants have been effective for her, patient reports history of migraines and is requesting rimegepant    OUTSIDE HISTORIAN(S):  Select: Spoke at length with the  Joe states patient is been struggling with depression for some time states that when she went to [facility] her condition deteriorated significantly he states that himself and the patient's parents will be available to continuously be with the patient 24/7 for the next few weeks if she is released    EXTERNAL RECORDS REVIEWED  Select:       PAST MEDICAL HISTORY  Past Medical History:   Diagnosis Date    Anesthesia 02/10/2021    mother hx apnea    Anxiety     Depression     Migraine     Seizure disorder, complex partial (HCC) 02/10/2021    hx of (last 1/2021)    TMJ arthropathy      .    SURGICAL HISTORY  Past Surgical History:   Procedure Laterality Date     W[place] NEUROPLASTY & OR TRANSPOS MEDIAN NRV CARPAL MATIAS Bilateral 3/15/2022    Procedure: LEFT OPEN CARPAL TUNNEL RELEASE, RIGHT OPEN CARPAL TUNNEL RELEASE;  Surgeon: Marlene Odom M.D.;  Location: [facility];  Service: Orthopedics    PB LAMINOTOMY,LUMBAR DISK,1 INTRSP Left 2/24/2021    Procedure: LAMINECTOMY, SPINE, LUMBAR, WITH [id number];  Surgeon: Indra Goode M.D.;  Location: [facility];  Service: Neurosurgery    LUMBAR TRANSFORAMINAL EPIDURAL STEROID INJECTION Bilateral 2/1/2021    Procedure: INJECTION, STEROID, SPINE, LUMBAR, EPIDURAL, TRANSFORAMINAL APPROACH;  Surgeon: Aldair Segovia M.D.;  [facility];  Service: Pain Management    BLEPHAROPLASTY Bilateral 2019    LEEP  2003    HERNIA REPAIR      LIPOMA EXCISION           FAMILY HISTORY  Family History   Problem Relation Age of Onset    Hypertension Mother     Cancer Father         prostate cancer    Depression Father     Genetic Disorder Father         Unknown Neuromusk disease    Hypertension Maternal Grandmother     Hyperlipidemia Maternal Grandmother     Genetic Disorder Brother         Unknown Neuromusk disease    Genetic Disorder Paternal Aunt         Unknown Neuromusk disease    Genetic Disorder Paternal Grandmother         Unknown Neuromusk disease          SOCIAL HISTORY  Social History     Socioeconomic History    Marital status:      Spouse name: Not on file    Number of children: Not on file    Years of education: Not on file    Highest education level: Not on file   Occupational History    Not on file   Tobacco Use    Smoking status: Never    Smokeless tobacco: Never   Vaping Use    Vaping Use: Never used   Substance and Sexual Activity    Alcohol use: Yes     Alcohol/week: 1.2 oz     Types: 2 Glasses of wine per week    Drug use: Not Currently     Types: Oral     Comment: marijuana edibles    Sexual activity: Never     Birth control/protection: Patch   Other Topics  "Concern     Service No    Blood Transfusions No    Caffeine Concern No    Occupational Exposure No    Hobby Hazards No    Sleep Concern Yes    Stress Concern Yes    Weight Concern No    Special Diet No    Back Care No    Exercise Yes    Bike Helmet No    Seat Belt Yes    Self-Exams Yes   Social History Narrative    Not on file     Social Determinants of Health     Financial Resource Strain: Not on file   Food Insecurity: Not on file   Transportation Needs: Not on file   Physical Activity: Not on file   Stress: Not on file   Social Connections: Not on file   Intimate Partner Violence: Not on file   Housing Stability: Not on file         CURRENT MEDICATIONS  No current facility-administered medications on file prior to encounter.     Current Outpatient Medications on File Prior to Encounter   Medication Sig Dispense Refill    rizatriptan (MAXALT) 10 MG tablet Take 10 mg by mouth one time as needed for Migraine.      clonazePAM (KLONOPIN) 1 MG Tab TAKE 1 TABLET (1 MG) BY MOUTH AT BEDTIME AS NEEDED FOR INSOMNIA, DO NOT COMBINE WITH ALPRAZOLAM      zolpidem (AMBIEN) 10 MG Tab Take 10 mg by mouth every evening.      ALPRAZolam (XANAX) 1 MG Tab TAKE 1/2 TO 1 TABLET BY MOUTH TWICE A DAY AS NEEDED FOR ANXIETY AND INSOMNIA      lacosamide (VIMPAT) 100 MG Tab tablet Take 1 Tablet by mouth 2 times a day for 180 days. 180 Tablet 1    Acetaminophen (TYLENOL) 325 MG Cap Take 1,000 mg by mouth as needed.      ibuprofen (MOTRIN) 800 MG Tab Take 800 mg by mouth every 8 hours as needed.             ALLERGIES  No Known Allergies    PHYSICAL EXAM  VITAL SIGNS:/66   Pulse 69   Temp 36.8 °C (98.2 °F) (Temporal)   Resp 19   Ht 1.753 m (5' 9\")   Wt 69.4 kg (153 lb)   SpO2 98%   BMI 22.59 kg/m²       GENERAL: Awake and alert  HEAD: Normocephalic and atraumatic  NECK: Normal range of motion, without meningismus  EYES: Pupils Equal, Round, Reactive to Light, extraocular movements intact, conjunctiva white  ENT: Mucous " membranes moist, oropharynx clear  PULMONARY: Normal effort, clear to auscultation  CARDIOVASCULAR: No murmurs, clicks or rubs, peripheral pulses 2+  ABDOMINAL: Soft, non-tender, no guarding or rigidity present, no pulsatile masses  BACK: no midline tenderness, no costovertebral tenderness  NEUROLOGICAL: Grossly non-focal neurological examination, speech normal, gait normal  EXTREMITIES: No edema, normal to inspection  SKIN: Warm and dry.  PSYCHIATRIC: Affect is appropriate    DIAGNOSTIC STUDIES / PROCEDURES  EKG  EKG Interpretation    Interpreted by emergency department physician    Sinus rhythm  Rate of 64  QT of 404    Compared to ECG 02/22/2021 10:32:28 no significant changes        LABS  Labs Reviewed   COMP METABOLIC PANEL - Abnormal; Notable for the following components:       Result Value    Correct Calcium 8.4 (*)     All other components within normal limits   URINE DRUG SCREEN - Abnormal; Notable for the following components:    Benzodiazepines Positive (*)     Oxycodone Positive (*)     All other components within normal limits   SALICYLATE - Abnormal; Notable for the following components:    Salicylates, Quant. <1.0 (*)     All other components within normal limits   POC BREATHALIZER - Normal   HCG QUAL SERUM   ETHYL ALCOHOL (BLOOD)   ESTIMATED GFR   DIAGNOSTIC ALCOHOL       COURSE & MEDICAL DECISION MAKING    ED COURSE:        INTERVENTIONS BY ME:  Medications   acetaminophen (Tylenol) tablet 650 mg (650 mg Oral Refused 3/15/24 0100)   zolpidem (Ambien) tablet 10 mg (has no administration in time range)         ED Observation Status? Yes; I am placing the patient in to an observation status due to a diagnostic uncertainty as well as therapeutic intensity. Patient placed in observation status at 1 AM March 15, 2024    Observation plan is as follows:   Patient with uncertainty surrounding most appropraite dispositation plan in addition to diagnostic unceratiny in regards to their current mental status.  Serial psychiatric examinations will be performed to aid in this decision.    Response on recheck:  Discussed again with daughter and  at this time given patient's ingestion and lack of sleep or family feel the most appropriate option is to reassess patient's most appropriate disposition during the daytime, family continued to voice concern about [facility] and stressed they felt the health he is planned for Roxanna is for her to go home in the care of her family.    6AM patient resting comfortably, patient signed out to day physician disposition as they see fit    INITIAL ASSESSMENT, COURSE AND PLAN  Care Narrative:     Patient presenting after intentional drug ingestion and suicide attempt patient's ingestion does appear to be quite serious and potentially near lethal.  Ingestion occurred at 3 PM time of my evaluation patient is very slightly sleepy but otherwise is not exhibiting signs of opiate toxidrome or benzodiazepine overdose.  She requested treatment for migraine, blood work and EKG does not show any other concerning signs of coingestions.  Patient placed on legal hold by myself and medically cleared.  Disposition is pending at time of dictation this time plan is made for family and patient to reconvene with alert team to decide upon appropriate disposition.    CMP alcohol level salicylate and acetaminophen level are unremarkable.     ADDITIONAL PROBLEM LIST    DISPOSITION AND DISCUSSIONS  Discussion of management with other Q or appropriate source(s): None         FINAL DIAGNOSIS  1. Intentional drug overdose, initial encounter ([facility])    2. Suicidal ideation             Electronically signed by: Kumar Martin DO ,7:32 AM 03/15/24

## 2024-03-15 NOTE — ED NOTES
Pt  with remsa to [facility]. Belongings home with spouse per pt approval. Radha at [facility] aware of pending pt arrival

## 2024-03-15 NOTE — DISCHARGE PLANNING
Alert Team/Behavioral Health   Note:      - [facility] ([facility]): Intake Counselor (Yesenia) called. Referral has been received and pending review. [facility] will call back when they have an update.    - [facility]: Talked to Isha. Referral has been received and pending review. No beds currently available.

## 2024-03-15 NOTE — ED NOTES
Pt's family gave the medication for her migraine Rizatriptan Benzoate 10mg (4tabs). Told the family that medication needs to be verified by the pharmacist in the morning.

## 2024-03-15 NOTE — DISCHARGE PLANNING
Alert Team/Behavioral Health   Note:      [facility] ALERT TEAM DISCHARGE PLANNING NOTE    Date:  3/15/24  Patient Name:  Roxanna Guzmán - 38 y.o. - Discharge Planning  MRN:  [id number]   YOB: 1985  ADMISSION DATE:  3/15/2024     Writer forwarded referral packet for inpatient psychiatric care to the following community providers:  [facility] ([facility]), [facility]     Items included in the referral packet:   _x____Face Sheet   _x____Pages 1 and 2 of completed legal hold   _____Alert Team/Psych Assessment   _x____H&P   _x____UDS   _x____Blood Alcohol   _x____Vital signs   _x neg____Pregnancy Test (if applicable)   _x____Medications List   _____Covid Screen

## 2024-04-12 ENCOUNTER — HOSPITAL ENCOUNTER (OUTPATIENT)
Dept: LAB | Facility: MEDICAL CENTER | Age: 39
End: 2024-04-12
Attending: FAMILY MEDICINE
Payer: COMMERCIAL

## 2024-04-12 LAB
25(OH)D3 SERPL-MCNC: 27 NG/ML (ref 30–100)
ALBUMIN SERPL BCP-MCNC: 4.5 G/DL (ref 3.2–4.9)
ALBUMIN/GLOB SERPL: 2 G/DL
ALP SERPL-CCNC: 48 U/L (ref 30–99)
ALT SERPL-CCNC: 24 U/L (ref 2–50)
ANION GAP SERPL CALC-SCNC: 10 MMOL/L (ref 7–16)
AST SERPL-CCNC: 19 U/L (ref 12–45)
BASOPHILS # BLD AUTO: 1.1 % (ref 0–1.8)
BASOPHILS # BLD: 0.07 K/UL (ref 0–0.12)
BILIRUB SERPL-MCNC: 0.5 MG/DL (ref 0.1–1.5)
BUN SERPL-MCNC: 18 MG/DL (ref 8–22)
CALCIUM ALBUM COR SERPL-MCNC: 8.6 MG/DL (ref 8.5–10.5)
CALCIUM SERPL-MCNC: 9 MG/DL (ref 8.5–10.5)
CHLORIDE SERPL-SCNC: 104 MMOL/L (ref 96–112)
CHOLEST SERPL-MCNC: 155 MG/DL (ref 100–199)
CO2 SERPL-SCNC: 24 MMOL/L (ref 20–33)
CREAT SERPL-MCNC: 0.76 MG/DL (ref 0.5–1.4)
EOSINOPHIL # BLD AUTO: 0.08 K/UL (ref 0–0.51)
EOSINOPHIL NFR BLD: 1.2 % (ref 0–6.9)
ERYTHROCYTE [DISTWIDTH] IN BLOOD BY AUTOMATED COUNT: 40.9 FL (ref 35.9–50)
EST. AVERAGE GLUCOSE BLD GHB EST-MCNC: 100 MG/DL
FASTING STATUS PATIENT QL REPORTED: NORMAL
FOLATE SERPL-MCNC: 16.3 NG/ML
GFR SERPLBLD CREATININE-BSD FMLA CKD-EPI: 102 ML/MIN/1.73 M 2
GLOBULIN SER CALC-MCNC: 2.2 G/DL (ref 1.9–3.5)
GLUCOSE SERPL-MCNC: 104 MG/DL (ref 65–99)
HBA1C MFR BLD: 5.1 % (ref 4–5.6)
HCT VFR BLD AUTO: 43.7 % (ref 37–47)
HDLC SERPL-MCNC: 52 MG/DL
HGB BLD-MCNC: 14.8 G/DL (ref 12–16)
IMM GRANULOCYTES # BLD AUTO: 0.01 K/UL (ref 0–0.11)
IMM GRANULOCYTES NFR BLD AUTO: 0.2 % (ref 0–0.9)
LDLC SERPL CALC-MCNC: 93 MG/DL
LYMPHOCYTES # BLD AUTO: 1.87 K/UL (ref 1–4.8)
LYMPHOCYTES NFR BLD: 28.4 % (ref 22–41)
MCH RBC QN AUTO: 29.8 PG (ref 27–33)
MCHC RBC AUTO-ENTMCNC: 33.9 G/DL (ref 32.2–35.5)
MCV RBC AUTO: 87.9 FL (ref 81.4–97.8)
MONOCYTES # BLD AUTO: 0.37 K/UL (ref 0–0.85)
MONOCYTES NFR BLD AUTO: 5.6 % (ref 0–13.4)
NEUTROPHILS # BLD AUTO: 4.18 K/UL (ref 1.82–7.42)
NEUTROPHILS NFR BLD: 63.5 % (ref 44–72)
NRBC # BLD AUTO: 0 K/UL
NRBC BLD-RTO: 0 /100 WBC (ref 0–0.2)
PLATELET # BLD AUTO: 302 K/UL (ref 164–446)
PMV BLD AUTO: 10 FL (ref 9–12.9)
POTASSIUM SERPL-SCNC: 4.8 MMOL/L (ref 3.6–5.5)
PROT SERPL-MCNC: 6.7 G/DL (ref 6–8.2)
RBC # BLD AUTO: 4.97 M/UL (ref 4.2–5.4)
SODIUM SERPL-SCNC: 138 MMOL/L (ref 135–145)
T4 FREE SERPL-MCNC: 1.57 NG/DL (ref 0.93–1.7)
TRIGL SERPL-MCNC: 48 MG/DL (ref 0–149)
TSH SERPL DL<=0.005 MIU/L-ACNC: 1.33 UIU/ML (ref 0.38–5.33)
VIT B12 SERPL-MCNC: 549 PG/ML (ref 211–911)
WBC # BLD AUTO: 6.6 K/UL (ref 4.8–10.8)

## 2024-04-12 PROCEDURE — 84439 ASSAY OF FREE THYROXINE: CPT

## 2024-04-12 PROCEDURE — 80053 COMPREHEN METABOLIC PANEL: CPT

## 2024-04-12 PROCEDURE — 36415 COLL VENOUS BLD VENIPUNCTURE: CPT

## 2024-04-12 PROCEDURE — 83036 HEMOGLOBIN GLYCOSYLATED A1C: CPT

## 2024-04-12 PROCEDURE — 82607 VITAMIN B-12: CPT

## 2024-04-12 PROCEDURE — 85025 COMPLETE CBC W/AUTO DIFF WBC: CPT

## 2024-04-12 PROCEDURE — 82746 ASSAY OF FOLIC ACID SERUM: CPT

## 2024-04-12 PROCEDURE — 80061 LIPID PANEL: CPT

## 2024-04-12 PROCEDURE — 82306 VITAMIN D 25 HYDROXY: CPT

## 2024-04-12 PROCEDURE — 84443 ASSAY THYROID STIM HORMONE: CPT

## 2024-04-16 ENCOUNTER — APPOINTMENT (OUTPATIENT)
Dept: NEUROLOGY | Facility: MEDICAL CENTER | Age: 39
End: 2024-04-16
Attending: PSYCHIATRY & NEUROLOGY
Payer: COMMERCIAL

## 2024-04-18 ENCOUNTER — OFFICE VISIT (OUTPATIENT)
Dept: NEUROLOGY | Facility: MEDICAL CENTER | Age: 39
End: 2024-04-18
Attending: PSYCHIATRY & NEUROLOGY
Payer: COMMERCIAL

## 2024-04-18 VITALS
DIASTOLIC BLOOD PRESSURE: 78 MMHG | TEMPERATURE: 98.1 F | HEIGHT: 69 IN | OXYGEN SATURATION: 97 % | BODY MASS INDEX: 22.96 KG/M2 | WEIGHT: 155 LBS | SYSTOLIC BLOOD PRESSURE: 126 MMHG | HEART RATE: 60 BPM | RESPIRATION RATE: 14 BRPM

## 2024-04-18 DIAGNOSIS — G43.E09 CHRONIC MIGRAINE WITH AURA WITHOUT STATUS MIGRAINOSUS, NOT INTRACTABLE: ICD-10-CM

## 2024-04-18 PROCEDURE — 700101 HCHG RX REV CODE 250: Performed by: PSYCHIATRY & NEUROLOGY

## 2024-04-18 PROCEDURE — 700111 HCHG RX REV CODE 636 W/ 250 OVERRIDE (IP): Mod: JZ | Performed by: PSYCHIATRY & NEUROLOGY

## 2024-04-18 PROCEDURE — 3074F SYST BP LT 130 MM HG: CPT | Performed by: PSYCHIATRY & NEUROLOGY

## 2024-04-18 PROCEDURE — 64615 CHEMODENERV MUSC MIGRAINE: CPT | Performed by: PSYCHIATRY & NEUROLOGY

## 2024-04-18 PROCEDURE — 3078F DIAST BP <80 MM HG: CPT | Performed by: PSYCHIATRY & NEUROLOGY

## 2024-04-18 RX ADMIN — SODIUM CHLORIDE 155 UNITS: 9 INJECTION, SOLUTION INTRAMUSCULAR; INTRAVENOUS; SUBCUTANEOUS at 13:43

## 2024-04-18 ASSESSMENT — FIBROSIS 4 INDEX: FIB4 SCORE: 0.5

## 2024-04-18 NOTE — PROCEDURES
[facility]    PROCEDURE NOTE    Procedure: Botulinum Injection  Primary Dx:   1. Chronic migraine with aura without status migrainosus, not intractable  onabotulinum toxin A (Botox) injection 155 Units        SUBJECTIVE:  Botox has been successful in reducing the intensity and frequency of migraines.  Denies recent illnesses or use of antibiotics.  Denies any significant negative side effects from the injections.     OBJECTIVE:  Vitals:    04/18/24 1330   BP: 126/78   Pulse: 60   Resp: 14   Temp: 36.7 °C (98.1 °F)   SpO2: 97%       ASSESSMENT&PLAN:  Botox therapy has reduced patient’s migraines by more than 7 days and/or 100 hours per month.  Although this patient is responding to botox, the patient is  NOT migraine free, however, and it is my recommendation Botox be continued at this time.  Frequency of headaches is >15 days monthly with at least 8 migraines monthly; Migraines include at least two of the following: worsened with activity or avoidance of activity with migraines (ie they go lie down), moderate to severe pain intensity, pulsing headache, unilateral headache and has  have either nausea or vomiting OR sensitivity to light and sound.    INFORMED CONSENT   The risks and benefits of the procedure were explained to the patient, and all questions were answered. Benefits of the injection include spasticity reduction by decrease in muscle activation following toxin injection. Adverse effects from toxin injection include but are not limited to: excessive weakness, infection, breathing and/or swallowing difficulty.  Common adverse effects from the injection itself are pain and bruising. The patient demonstrated good understanding of risks and benefits and consents to botulinum toxin injection.     Pre-procedure time out was performed.     PROCEDURE:  After clean preparation of skin using an alcohol swab, a needle was inserted into the muscle(s) listed below.          10 units divided in 2 sites   Procerus  5 units in 1 site   Frontalis  20 units divided in 4 sites   Temporalis  40 units divided into 8 sites   Occipitalis  30 units divided into 6 sites   Cervical paraspinals  20 units divided into 4 sites   Trapezius 30 units divided into 6 sites     I treated this patient in clinic today with BotoxA 155 units according to the dosing/injection paradigm currently mandated by the FDA for the management of chronic migraine. Specifically, I injected 5 units to the procerus, 5 units to the corrugators bilaterally, a total of 20 units to the frontalis musculature, 20 units to the temporalis bilaterally, 15 units to the occipitalis bilaterally, 10 units to the cervical paraspinals bilaterally and 15 units to the trapezius musculature bilaterally. The remainder of the Botox 200 units mixed but not administered was discarded as wastage per FDA guidelines  Consent on file.  Patient identify verified with 2 patient identifiers.    Injection sites were cleaned with alcohol and band aid was applied afterwards.  The patient tolerated the procedure well.  There were no complications.     MEDICATION USED:  200 units of botulinum toxin type A, mixed with 4 cc of sterile, preservative-free normal saline in a 4 separate 1 cc syringes.    Total units injected: 155 units  Total units discarded: 45 units    This patient has chronic migraines, defined as having 15 or more headaches days per month, 8 of which are migraines, over a minimum of the last three months. Episodes last more than 4 hours (untreated).  Pt has 2 or more of following (see initial note) -  Headache worsened with activity, pain is moderate to severe intensity, pulsing in nature, unilateral and patient either has nausea/vomiting OR sensitivity to light and sound. This patient does not also have medication overuse headache.    No adverse effect of Botox noted at conclusion of today's appointment. Pt was instructed to  seek immediate medical attention if fever, difficulty breathing, difficulty swallowing, or other concerning sxs arise. Counseled patient not to rub face, exercise, or use heat for at least 24 hours to prevent spreading of medication. Counseled that while every attempt possible is made to avoid minor and major blood vessels, mild bruising can occur due to injection and should resolve within 5-7 days.    RTC in 3 months for repeat injection, or sooner if concerns arise.     Issa Walls MD  Neurology Attending, Headache Program  [facility]

## 2024-07-23 ENCOUNTER — OFFICE VISIT (OUTPATIENT)
Dept: NEUROLOGY | Facility: MEDICAL CENTER | Age: 39
End: 2024-07-23
Attending: PSYCHIATRY & NEUROLOGY
Payer: COMMERCIAL

## 2024-07-23 VITALS
HEIGHT: 69 IN | SYSTOLIC BLOOD PRESSURE: 124 MMHG | TEMPERATURE: 98.7 F | BODY MASS INDEX: 22.96 KG/M2 | HEART RATE: 77 BPM | DIASTOLIC BLOOD PRESSURE: 80 MMHG | WEIGHT: 155 LBS | OXYGEN SATURATION: 98 % | RESPIRATION RATE: 14 BRPM

## 2024-07-23 DIAGNOSIS — G43.E09 CHRONIC MIGRAINE WITH AURA WITHOUT STATUS MIGRAINOSUS, NOT INTRACTABLE: ICD-10-CM

## 2024-07-23 PROCEDURE — 64615 CHEMODENERV MUSC MIGRAINE: CPT | Performed by: PSYCHIATRY & NEUROLOGY

## 2024-07-23 PROCEDURE — 700101 HCHG RX REV CODE 250

## 2024-07-23 PROCEDURE — 3079F DIAST BP 80-89 MM HG: CPT | Performed by: PSYCHIATRY & NEUROLOGY

## 2024-07-23 PROCEDURE — 3074F SYST BP LT 130 MM HG: CPT | Performed by: PSYCHIATRY & NEUROLOGY

## 2024-07-23 PROCEDURE — 700111 HCHG RX REV CODE 636 W/ 250 OVERRIDE (IP): Mod: JZ

## 2024-07-23 RX ADMIN — SODIUM CHLORIDE 155 UNITS: 9 INJECTION INTRAMUSCULAR; INTRAVENOUS; SUBCUTANEOUS at 14:07

## 2024-07-23 ASSESSMENT — FIBROSIS 4 INDEX: FIB4 SCORE: 0.5

## 2024-10-15 ENCOUNTER — TELEPHONE (OUTPATIENT)
Dept: NEUROLOGY | Facility: MEDICAL CENTER | Age: 39
End: 2024-10-15

## 2024-10-15 ENCOUNTER — OFFICE VISIT (OUTPATIENT)
Dept: NEUROLOGY | Facility: MEDICAL CENTER | Age: 39
End: 2024-10-15
Attending: PSYCHIATRY & NEUROLOGY
Payer: COMMERCIAL

## 2024-10-15 VITALS
OXYGEN SATURATION: 98 % | BODY MASS INDEX: 25.76 KG/M2 | DIASTOLIC BLOOD PRESSURE: 80 MMHG | SYSTOLIC BLOOD PRESSURE: 130 MMHG | HEART RATE: 82 BPM | TEMPERATURE: 98.9 F | WEIGHT: 173.94 LBS | HEIGHT: 69 IN | RESPIRATION RATE: 14 BRPM

## 2024-10-15 DIAGNOSIS — G43.E09 CHRONIC MIGRAINE WITH AURA WITHOUT STATUS MIGRAINOSUS, NOT INTRACTABLE: ICD-10-CM

## 2024-10-15 PROCEDURE — 3075F SYST BP GE 130 - 139MM HG: CPT | Performed by: PSYCHIATRY & NEUROLOGY

## 2024-10-15 PROCEDURE — 64615 CHEMODENERV MUSC MIGRAINE: CPT | Performed by: PSYCHIATRY & NEUROLOGY

## 2024-10-15 PROCEDURE — 3079F DIAST BP 80-89 MM HG: CPT | Performed by: PSYCHIATRY & NEUROLOGY

## 2024-10-15 PROCEDURE — 700111 HCHG RX REV CODE 636 W/ 250 OVERRIDE (IP): Mod: JZ

## 2024-10-15 PROCEDURE — 700101 HCHG RX REV CODE 250

## 2024-10-15 RX ORDER — RIMEGEPANT SULFATE 75 MG/75MG
75 TABLET, ORALLY DISINTEGRATING ORAL PRN
Qty: 9 TABLET | Refills: 5 | Status: SHIPPED | OUTPATIENT
Start: 2024-10-15

## 2024-10-15 RX ADMIN — SODIUM CHLORIDE 155 UNITS: 9 INJECTION INTRAMUSCULAR; INTRAVENOUS; SUBCUTANEOUS at 14:03

## 2024-10-15 ASSESSMENT — PATIENT HEALTH QUESTIONNAIRE - PHQ9: CLINICAL INTERPRETATION OF PHQ2 SCORE: 0

## 2024-10-15 ASSESSMENT — FIBROSIS 4 INDEX: FIB4 SCORE: 0.51

## 2024-10-27 ENCOUNTER — OFFICE VISIT (OUTPATIENT)
Dept: URGENT CARE | Facility: CLINIC | Age: 39
End: 2024-10-27
Payer: COMMERCIAL

## 2024-10-27 VITALS
RESPIRATION RATE: 14 BRPM | HEIGHT: 69 IN | WEIGHT: 174 LBS | DIASTOLIC BLOOD PRESSURE: 64 MMHG | BODY MASS INDEX: 25.77 KG/M2 | OXYGEN SATURATION: 96 % | SYSTOLIC BLOOD PRESSURE: 126 MMHG | HEART RATE: 88 BPM | TEMPERATURE: 98.8 F

## 2024-10-27 DIAGNOSIS — J40 BRONCHITIS: ICD-10-CM

## 2024-10-27 PROCEDURE — 3078F DIAST BP <80 MM HG: CPT

## 2024-10-27 PROCEDURE — 3074F SYST BP LT 130 MM HG: CPT

## 2024-10-27 PROCEDURE — 99213 OFFICE O/P EST LOW 20 MIN: CPT

## 2024-10-27 RX ORDER — METHYLPREDNISOLONE 4 MG/1
TABLET ORAL
Qty: 21 TABLET | Refills: 0 | Status: SHIPPED | OUTPATIENT
Start: 2024-10-27

## 2024-10-27 RX ORDER — ALBUTEROL SULFATE 90 UG/1
2 INHALANT RESPIRATORY (INHALATION) EVERY 6 HOURS PRN
Qty: 8.5 G | Refills: 0 | Status: SHIPPED | OUTPATIENT
Start: 2024-10-27

## 2024-10-27 RX ORDER — AZITHROMYCIN 250 MG/1
TABLET, FILM COATED ORAL
Qty: 6 TABLET | Refills: 0 | Status: SHIPPED | OUTPATIENT
Start: 2024-10-27

## 2024-10-27 ASSESSMENT — FIBROSIS 4 INDEX: FIB4 SCORE: 0.51

## 2024-11-05 ENCOUNTER — APPOINTMENT (OUTPATIENT)
Dept: RADIOLOGY | Facility: IMAGING CENTER | Age: 39
End: 2024-11-05
Attending: NURSE PRACTITIONER
Payer: COMMERCIAL

## 2024-11-05 ENCOUNTER — OFFICE VISIT (OUTPATIENT)
Dept: URGENT CARE | Facility: CLINIC | Age: 39
End: 2024-11-05
Payer: COMMERCIAL

## 2024-11-05 VITALS
TEMPERATURE: 98.3 F | HEIGHT: 69 IN | HEART RATE: 97 BPM | BODY MASS INDEX: 26.07 KG/M2 | WEIGHT: 176 LBS | RESPIRATION RATE: 16 BRPM | SYSTOLIC BLOOD PRESSURE: 104 MMHG | DIASTOLIC BLOOD PRESSURE: 66 MMHG | OXYGEN SATURATION: 96 %

## 2024-11-05 DIAGNOSIS — R05.2 SUBACUTE COUGH: ICD-10-CM

## 2024-11-05 DIAGNOSIS — J40 BRONCHITIS WITH WHEEZING: ICD-10-CM

## 2024-11-05 DIAGNOSIS — J01.40 ACUTE NON-RECURRENT PANSINUSITIS: ICD-10-CM

## 2024-11-05 DIAGNOSIS — T36.95XA ANTIBIOTIC-INDUCED YEAST INFECTION: ICD-10-CM

## 2024-11-05 DIAGNOSIS — B37.9 ANTIBIOTIC-INDUCED YEAST INFECTION: ICD-10-CM

## 2024-11-05 DIAGNOSIS — J98.09 RECURRENT BRONCHOSPASM: ICD-10-CM

## 2024-11-05 PROCEDURE — 99214 OFFICE O/P EST MOD 30 MIN: CPT | Performed by: NURSE PRACTITIONER

## 2024-11-05 PROCEDURE — 3078F DIAST BP <80 MM HG: CPT | Performed by: NURSE PRACTITIONER

## 2024-11-05 PROCEDURE — 3074F SYST BP LT 130 MM HG: CPT | Performed by: NURSE PRACTITIONER

## 2024-11-05 PROCEDURE — 71046 X-RAY EXAM CHEST 2 VIEWS: CPT | Mod: TC,FY | Performed by: RADIOLOGY

## 2024-11-05 RX ORDER — DEXTROMETHORPHAN HYDROBROMIDE AND PROMETHAZINE HYDROCHLORIDE 15; 6.25 MG/5ML; MG/5ML
5 SYRUP ORAL EVERY 6 HOURS PRN
Qty: 118 ML | Refills: 0 | Status: SHIPPED | OUTPATIENT
Start: 2024-11-05 | End: 2024-11-12

## 2024-11-05 RX ORDER — FLUCONAZOLE 150 MG/1
TABLET ORAL
Qty: 2 TABLET | Refills: 0 | Status: SHIPPED | OUTPATIENT
Start: 2024-11-05

## 2024-11-05 RX ORDER — PREDNISONE 20 MG/1
40 TABLET ORAL DAILY
Qty: 10 TABLET | Refills: 0 | Status: SHIPPED | OUTPATIENT
Start: 2024-11-05 | End: 2024-11-10

## 2024-11-05 ASSESSMENT — FIBROSIS 4 INDEX: FIB4 SCORE: 0.51

## 2024-11-05 NOTE — PROGRESS NOTES
"Verbal consent was acquired by the patient to use [employer] ambient listening note generation during this visit     Date: 11/05/24        Chief Complaint   Patient presents with    Cough     \"Ongoing x1 month now, more productive and more frequent attacks\"          History of Present Illness  The patient is a 39-year-old female who presents for evaluation of a cough.    She has been experiencing a cough for approximately a month, initially attributing it to allergies. However, she now suspects it may have progressed to pneumonia due to accompanying shortness of breath and coughing attacks triggered during patient interactions. She also reports a slight wheeze. Despite using albuterol, her symptoms have not improved. Her cough has become more productive, yielding yellow sputum, and she experiences chest tightness. She reports no fever or body aches but mentions neck and shoulder pain from coughing. She had no cold symptoms at the onset of her cough in early 10/2024, only a scratchy throat. She also reports right ear congestion and intermittent sinus pressure. She is sensitive to barometric changes and recently traveled to [place] for a conference. She has tried various over-the-counter medications without success. She takes ibuprofen as needed, typically 800 mg, and took this dose upon waking this morning. She experiences acid reflux when coughing but does not wake up with a bitter taste in her mouth or sulfur burps. She does not wake up frequently at night due to coughing but often has a severe coughing attack before bed. She also reports frontal sinus pain.    She had a herniated disc several years ago that was surgically resected and ever since she has had SI joint pain on the contralateral side. 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 She is not currently breastfeeding. She had some injections for Botox for her migraines.    SOCIAL HISTORY  She is a periodontist.         ROS:    No severe shortness of " breath   No Cardiac like chest pain, as discussed   As otherwise stated in HPI    Medical/SX/ Social History:  Reviewed per chart    Pertinent Medications:    Current Outpatient Medications on File Prior to Visit   Medication Sig Dispense Refill    Rimegepant Sulfate (NURTEC) 75 MG TABLET DISPERSIBLE Take 1 Tablet by mouth as needed (migraine). 9 Tablet 5    acetaminophen (TYLENOL) 500 MG Tab Take 1,000 mg by mouth every 6 hours as needed for Moderate Pain.      ibuprofen (MOTRIN) 800 MG Tab Take 800 mg by mouth every 8 hours as needed for Moderate Pain or Headache.      albuterol 108 (90 Base) MCG/ACT Aero Soln inhalation aerosol Inhale 2 Puffs every 6 hours as needed for Shortness of Breath. (Patient not taking: Reported on 11/5/2024) 8.5 g 0     No current facility-administered medications on file prior to visit.        Allergies:    Patient has no known allergies.     Problem list, medications, and allergies reviewed by myself today in Epic     Physical Exam:    Vitals:    11/05/24 0926   BP: 104/66   Pulse: 97   Resp: 16   Temp: 36.8 °C (98.3 °F)   SpO2: 96%             Physical Exam  Vitals reviewed.   Constitutional:       General: She is not in acute distress.     Appearance: Normal appearance. She is well-developed. She is not toxic-appearing.   HENT:      Head: Normocephalic and atraumatic.      Right Ear: Tympanic membrane, ear canal and external ear normal.      Left Ear: Tympanic membrane, ear canal and external ear normal.      Nose: Mucosal edema and congestion present.      Right Turbinates: Swollen.      Left Turbinates: Swollen.      Right Sinus: Maxillary sinus tenderness and frontal sinus tenderness present.      Left Sinus: No maxillary sinus tenderness or frontal sinus tenderness.      Mouth/Throat:      Lips: Pink.      Mouth: Mucous membranes are moist.      Pharynx: Pharyngeal swelling, posterior oropharyngeal erythema and postnasal drip present.      Tonsils: No tonsillar exudate.   Eyes:       General: Lids are normal. Gaze aligned appropriately. No allergic shiner or scleral icterus.     Extraocular Movements: Extraocular movements intact.      Conjunctiva/sclera: Conjunctivae normal.      Pupils: Pupils are equal, round, and reactive to light.   Cardiovascular:      Rate and Rhythm: Normal rate and regular rhythm.      Pulses:           Radial pulses are 2+ on the right side and 2+ on the left side.      Heart sounds: Normal heart sounds.   Pulmonary:      Effort: Pulmonary effort is normal.      Breath sounds: Examination of the left-upper field reveals wheezing. Wheezing present. No decreased breath sounds, rhonchi or rales.   Musculoskeletal:      Right lower leg: No edema.      Left lower leg: No edema.   Lymphadenopathy:      Cervical: No cervical adenopathy.   Skin:     General: Skin is warm.      Capillary Refill: Capillary refill takes less than 2 seconds.      Coloration: Skin is not cyanotic or pale.      Findings: No rash.   Neurological:      Mental Status: She is alert.      Gait: Gait is intact.   Psychiatric:         Behavior: Behavior normal. Behavior is cooperative.            Diagnostics:    DX-CHEST-2 VIEWS    Result Date: 11/5/2024 11/5/2024 9:49 AM HISTORY/REASON FOR EXAM:  4 week cough, worsening. concern for pns vs bronchitits. left upper wheezing TECHNIQUE/EXAM DESCRIPTION: PA and lateral views of the chest. COMPARISON:  Chest x-ray 2/10/2021 FINDINGS: The lungs are clear. The cardiac silhouette is normal in size. No effusions or pneumothoraces are present. There are no significant osseous abnormalities. The visualized portions of the upper abdomen are within normal limits.     NEGATIVE TWO VIEWS OF THE CHEST.      Medical Decision making and plan :  I personally reviewed prior external notes and test results pertinent to today's visit. Pt is clinically stable at today's acute urgent care visit.  Patient appears nontoxic with no acute distress noted. Appropriate for  outpatient care at this time.    Shu 39 y.o. female presented clinic with:     Assessment & Plan  1. Cough.  The chest x-ray results were normal, showing no signs of pneumonia or opacities. The cough could be a result of silent acid reflux, which can cause mucus production, irritation, and trigger coughing. A trial of omeprazole for 2 weeks was suggested. A prescription for Augmentin for 7 days was provided for sinusitis. An inhaled steroid was recommended, and a prescription for Qvar inhaler, 1 puff twice daily, was given. Promethazine DM was prescribed for nighttime use. Diflucan was also prescribed. She was advised to rinse her mouth after using the inhaler. Omeprazole was recommended for daily use at night, spaced out with prednisone and antibiotics. She was advised to return if her condition worsens.    2. Wheezing.  Wheezing was noted, especially in the left upper lung. The patient was prescribed Qvar inhaler, 1 puff twice daily, and a trial of prednisone 40 mg for 5 days was suggested. She was advised to rinse her mouth after using the inhaler to prevent thrush. Diflucan was prescribed to be taken if symptoms of a yeast infection develop.    3. Sinus Congestion.  Intermittent sinus congestion and pain were reported. Augmentin was prescribed for 7 days to cover for a potential sinus infection.     4. Acid Reflux.  Acid reflux was noted, particularly associated with coughing. A trial of omeprazole for 2 weeks was suggested to see if it helps reduce symptoms. She was advised to take omeprazole daily at night, spaced out with prednisone and antibiotics to prevent an ulcer and help with reflux symptoms.          Shared decision-making was utilized with patient for treatment plan. Medication discussed included indication for use and the potential benefits and side effects. Education was provided regarding the aforementioned assessments.  Differential Diagnosis, natural history, and supportive care discussed.  All of the patient's questions were answered to their satisfaction at the time of discharge. Patient was encouraged to monitor symptoms closely. Those signs and symptoms which would warrant concern and mandate seeking a higher level of service through the emergency department discussed at length.  Patient stated agreement and understanding of this plan of care.    Disposition:  Home in stable condition     Voice Recognition Disclaimer:  Portions of this document were created using voice recognition software and [employer] technology provided by [employer]. The software does have a chance of producing errors of grammar and possibly content. I have made every reasonable attempt to correct obvious errors, but there may be errors of grammar and possibly content that I did not discover before finalizing the  documentation.    Krista Jiménez, FRED.

## 2025-01-07 ENCOUNTER — APPOINTMENT (OUTPATIENT)
Dept: NEUROLOGY | Facility: MEDICAL CENTER | Age: 40
End: 2025-01-07
Attending: PSYCHIATRY & NEUROLOGY
Payer: COMMERCIAL

## 2025-01-21 ENCOUNTER — HOSPITAL ENCOUNTER (OUTPATIENT)
Facility: MEDICAL CENTER | Age: 40
End: 2025-01-21
Attending: OBSTETRICS & GYNECOLOGY | Admitting: OBSTETRICS & GYNECOLOGY
Payer: COMMERCIAL

## 2025-01-22 ENCOUNTER — APPOINTMENT (OUTPATIENT)
Dept: ADMISSIONS | Facility: MEDICAL CENTER | Age: 40
End: 2025-01-22
Attending: OBSTETRICS & GYNECOLOGY
Payer: COMMERCIAL

## 2025-01-27 ENCOUNTER — PRE-ADMISSION TESTING (OUTPATIENT)
Dept: ADMISSIONS | Facility: MEDICAL CENTER | Age: 40
End: 2025-01-27
Attending: OBSTETRICS & GYNECOLOGY
Payer: COMMERCIAL

## 2025-01-27 RX ORDER — MULTIVITAMIN WITH IRON
TABLET ORAL
COMMUNITY

## 2025-01-27 NOTE — PREADMIT AVS NOTE
Current Medications   Medication Instructions    multivitamin Tab Stop 7 days before surgery    cholecalciferol (VITAMIN D3) 5000 UNIT Cap Stop 7 days before surgery    Magnesium 250 MG Tab Stop 7 days before surgery    Rimegepant Sulfate (NURTEC) 75 MG TABLET DISPERSIBLE Hold medication day of procedure    acetaminophen (TYLENOL) 500 MG Tab As needed medication, may take if needed, including morning of procedure     ibuprofen (MOTRIN) 800 MG Tab Stop 4 days before surgery

## 2025-01-28 VITALS — HEIGHT: 69 IN | BODY MASS INDEX: 25.8 KG/M2 | WEIGHT: 174.16 LBS

## 2025-01-28 RX ORDER — SODIUM CHLORIDE, SODIUM LACTATE, POTASSIUM CHLORIDE, CALCIUM CHLORIDE 600; 310; 30; 20 MG/100ML; MG/100ML; MG/100ML; MG/100ML
INJECTION, SOLUTION INTRAVENOUS CONTINUOUS
Status: CANCELLED | OUTPATIENT
Start: 2025-01-28 | End: 2025-01-28

## 2025-01-28 ASSESSMENT — FIBROSIS 4 INDEX: FIB4 SCORE: 0.51

## 2025-02-11 ENCOUNTER — OFFICE VISIT (OUTPATIENT)
Dept: NEUROLOGY | Facility: MEDICAL CENTER | Age: 40
End: 2025-02-11
Attending: PSYCHIATRY & NEUROLOGY
Payer: COMMERCIAL

## 2025-02-11 VITALS
RESPIRATION RATE: 16 BRPM | TEMPERATURE: 97.4 F | SYSTOLIC BLOOD PRESSURE: 110 MMHG | DIASTOLIC BLOOD PRESSURE: 72 MMHG | BODY MASS INDEX: 26.36 KG/M2 | OXYGEN SATURATION: 98 % | HEART RATE: 74 BPM | HEIGHT: 69 IN | WEIGHT: 178 LBS

## 2025-02-11 DIAGNOSIS — G43.E09 CHRONIC MIGRAINE WITH AURA WITHOUT STATUS MIGRAINOSUS, NOT INTRACTABLE: ICD-10-CM

## 2025-02-11 DIAGNOSIS — G40.909 SEIZURE DISORDER (HCC): ICD-10-CM

## 2025-02-11 PROCEDURE — 3074F SYST BP LT 130 MM HG: CPT | Performed by: PSYCHIATRY & NEUROLOGY

## 2025-02-11 PROCEDURE — 700101 HCHG RX REV CODE 250

## 2025-02-11 PROCEDURE — 64615 CHEMODENERV MUSC MIGRAINE: CPT | Performed by: PSYCHIATRY & NEUROLOGY

## 2025-02-11 PROCEDURE — 3078F DIAST BP <80 MM HG: CPT | Performed by: PSYCHIATRY & NEUROLOGY

## 2025-02-11 PROCEDURE — 700111 HCHG RX REV CODE 636 W/ 250 OVERRIDE (IP): Mod: JZ

## 2025-02-11 RX ORDER — RIMEGEPANT SULFATE 75 MG/75MG
75 TABLET, ORALLY DISINTEGRATING ORAL PRN
Qty: 9 TABLET | Refills: 5 | Status: SHIPPED | OUTPATIENT
Start: 2025-02-11 | End: 2025-02-18 | Stop reason: SDUPTHER

## 2025-02-11 RX ORDER — LACOSAMIDE 100 MG/1
100 TABLET ORAL 2 TIMES DAILY
Qty: 60 TABLET | Refills: 5 | Status: SHIPPED | OUTPATIENT
Start: 2025-02-11 | End: 2025-08-10

## 2025-02-11 RX ADMIN — SODIUM CHLORIDE 155 UNITS: 9 INJECTION, SOLUTION INTRAMUSCULAR; INTRAVENOUS; SUBCUTANEOUS at 17:18

## 2025-02-11 ASSESSMENT — FIBROSIS 4 INDEX: FIB4 SCORE: 0.51

## 2025-02-11 ASSESSMENT — PATIENT HEALTH QUESTIONNAIRE - PHQ9: CLINICAL INTERPRETATION OF PHQ2 SCORE: 0

## 2025-02-12 NOTE — PROCEDURES
[facility]    PROCEDURE NOTE    Procedure: Botulinum Injection  Primary Dx:   1. Chronic migraine with aura without status migrainosus, not intractable  onabotulinum toxin A (Botox) injection 155 Units        SUBJECTIVE:  Botox has been successful in reducing the intensity and frequency of migraines.  Denies recent illnesses or use of antibiotics.  Denies any significant negative side effects from the injections.     OBJECTIVE:  Vitals:    02/11/25 1639   BP: 110/72   Pulse: 74   Resp: 16   Temp: 36.3 °C (97.4 °F)   SpO2: 98%       ASSESSMENT&PLAN:  Botox therapy has reduced patient’s migraines by more than 7 days and/or 100 hours per month.  Although this patient is responding to botox, the patient is  NOT migraine free, however, and it is my recommendation Botox be continued at this time.  Frequency of headaches is >15 days monthly with at least 8 migraines monthly; Migraines include at least two of the following: worsened with activity or avoidance of activity with migraines (ie they go lie down), moderate to severe pain intensity, pulsing headache, unilateral headache and has  have either nausea or vomiting OR sensitivity to light and sound.    INFORMED CONSENT   The risks and benefits of the procedure were explained to the patient, and all questions were answered. Benefits of the injection include spasticity reduction by decrease in muscle activation following toxin injection. Adverse effects from toxin injection include but are not limited to: excessive weakness, infection, breathing and/or swallowing difficulty.  Common adverse effects from the injection itself are pain and bruising. The patient demonstrated good understanding of risks and benefits and consents to botulinum toxin injection.     Pre-procedure time out was performed.     PROCEDURE:  After clean preparation of skin using an alcohol swab, a needle was inserted into the muscle(s) listed below.          10 units divided in 2 sites   Procerus  5 units in 1 site   Frontalis  20 units divided in 4 sites   Temporalis  40 units divided into 8 sites   Occipitalis  30 units divided into 6 sites   Cervical paraspinals  20 units divided into 4 sites   Trapezius 30 units divided into 6 sites     I treated this patient in clinic today with BotoxA 155 units according to the dosing/injection paradigm currently mandated by the FDA for the management of chronic migraine. Specifically, I injected 5 units to the procerus, 5 units to the corrugators bilaterally, a total of 20 units to the frontalis musculature, 20 units to the temporalis bilaterally, 15 units to the occipitalis bilaterally, 10 units to the cervical paraspinals bilaterally and 15 units to the trapezius musculature bilaterally. The remainder of the Botox 200 units mixed but not administered was discarded as wastage per FDA guidelines  Consent on file.  Patient identify verified with 2 patient identifiers.    Injection sites were cleaned with alcohol and band aid was applied afterwards.  The patient tolerated the procedure well.  There were no complications.     MEDICATION USED:  200 units of botulinum toxin type A, mixed with 4 cc of sterile, preservative-free normal saline in a 4 separate 1 cc syringes.    Total units injected: 155 units  Total units discarded: 45 units    This patient has chronic migraines, defined as having 15 or more headaches days per month, 8 of which are migraines, over a minimum of the last three months. Episodes last more than 4 hours (untreated).  Pt has 2 or more of following (see initial note) -  Headache worsened with activity, pain is moderate to severe intensity, pulsing in nature, unilateral and patient either has nausea/vomiting OR sensitivity to light and sound. This patient does not also have medication overuse headache.    No adverse effect of Botox noted at conclusion of today's appointment. Pt was instructed to  seek immediate medical attention if fever, difficulty breathing, difficulty swallowing, or other concerning sxs arise. Counseled patient not to rub face, exercise, or use heat for at least 24 hours to prevent spreading of medication. Counseled that while every attempt possible is made to avoid minor and major blood vessels, mild bruising can occur due to injection and should resolve within 5-7 days.    Focal Epilepsy: Provided Refills for Vimpat 100 mg BID until the new visit with me.     RTC in 3 months for repeat injection, or sooner if concerns arise.     Issa Walls MD  Neurology Attending, Headache Program  [facility]

## 2025-02-18 ENCOUNTER — OFFICE VISIT (OUTPATIENT)
Dept: NEUROLOGY | Facility: MEDICAL CENTER | Age: 40
End: 2025-02-18
Attending: PSYCHIATRY & NEUROLOGY
Payer: COMMERCIAL

## 2025-02-18 VITALS
HEART RATE: 71 BPM | TEMPERATURE: 97.2 F | SYSTOLIC BLOOD PRESSURE: 104 MMHG | BODY MASS INDEX: 26.97 KG/M2 | OXYGEN SATURATION: 99 % | WEIGHT: 182.1 LBS | HEIGHT: 69 IN | DIASTOLIC BLOOD PRESSURE: 58 MMHG

## 2025-02-18 DIAGNOSIS — G40.909 SEIZURE DISORDER (HCC): ICD-10-CM

## 2025-02-18 DIAGNOSIS — G43.E09 CHRONIC MIGRAINE WITH AURA WITHOUT STATUS MIGRAINOSUS, NOT INTRACTABLE: ICD-10-CM

## 2025-02-18 PROCEDURE — 99417 PROLNG OP E/M EACH 15 MIN: CPT | Performed by: PSYCHIATRY & NEUROLOGY

## 2025-02-18 PROCEDURE — 99212 OFFICE O/P EST SF 10 MIN: CPT | Performed by: PSYCHIATRY & NEUROLOGY

## 2025-02-18 PROCEDURE — 3074F SYST BP LT 130 MM HG: CPT | Performed by: PSYCHIATRY & NEUROLOGY

## 2025-02-18 PROCEDURE — 3078F DIAST BP <80 MM HG: CPT | Performed by: PSYCHIATRY & NEUROLOGY

## 2025-02-18 PROCEDURE — 99215 OFFICE O/P EST HI 40 MIN: CPT | Mod: 24 | Performed by: PSYCHIATRY & NEUROLOGY

## 2025-02-18 RX ORDER — CLONAZEPAM 0.5 MG/1
0.25 TABLET ORAL PRN
Qty: 2 TABLET | Refills: 0 | Status: SHIPPED | OUTPATIENT
Start: 2025-02-18 | End: 2025-03-20

## 2025-02-18 RX ORDER — RIMEGEPANT SULFATE 75 MG/75MG
75 TABLET, ORALLY DISINTEGRATING ORAL PRN
Qty: 9 TABLET | Refills: 5 | Status: SHIPPED | OUTPATIENT
Start: 2025-02-18

## 2025-02-18 ASSESSMENT — PATIENT HEALTH QUESTIONNAIRE - PHQ9: CLINICAL INTERPRETATION OF PHQ2 SCORE: 0

## 2025-02-18 ASSESSMENT — FIBROSIS 4 INDEX: FIB4 SCORE: 0.51

## 2025-02-18 NOTE — PROGRESS NOTES
"[facility] Epilepsy Program  New Patient Visit      Patient's Name: Roxanna Guzmán  YOB: 1985  MRN: [id number]  Date of Service: 02/18/25.    Referring Provider: No referring provider defined for this encounter.    Chief Concern: Seizures.     HPI: The patient is a 39 y.o., right-handed female, who initially presented to my epilepsy clinic for evaluation of history of seizures on 02/18/2025.    The patient started having stereotypical seizures around age 5-6, but she never mentioned to anyone at the time. 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     She never had convulsions. Never had episodes where she wakes  in the morning sore/wetted/with blood in her mouth. She never had myoclonus.     The patient has migraine headaches since age 15. She never got visual aura prior to her headaches. The headache is typically unilateral, severe, pulsating, stabbing, with photophobia, some phonophobia, and nausea. She rarely gets aphasia with her migraines, but no other focal neurological signs.  She gets her headaches on average 2-5 days per month with Botox, but prior to Botox, she had them up to 15 daily per month.     She used to follow with Dr. Segovia for her back problems.     There is a history of degenerative neuromuscular disease on her father's family, but no clear diagnosis so far.     Semiology:    Event type #1: \"Spells\".  Year/Age of Onset: Age 4 or 5 (around 1990).   Initial features: She experiences vibration sensation, experiences auditory hallucinations (snippets from cartoons/songs), tuyet vu, and at times tightness in her chest.  Event features: She aware of others talking to her, but does not process the meaning. She is not able to speak during the episode. Observes would notice \"blank\" look and her inability to respond. No oral nor manual automatisms. No loss of awareness. Her motor abilities are " fully preserved.   Post-ictal features: Feels a bit confused (has hard time recalling names) and a bit tired.   Duration: On average 30 seconds, but can last up to 60 seconds.   Frequency: She used to have them multiple times per week/in clusters during her residency. Later on she had them once in several years. The last event was in 2024.   Precipitating factors: Stress.     History of status epilepticus: no  History of physical injury related to seizures: no  History of surgery related to epilepsy: no  Family Planning: They are trying to get pregnant.   Current Driving Status: She is driving with no problems at this time.   Seizure Clusters: Yes.   Longest Seizure Freedom: Since 2024.     Pertinent Ancillary Test Results:    MRI brain studies:   - MRI brain - none available for my review.     EEG studies:   - Standard EEG - done in the past and reportedly normal.     Current Antiseizure Medications: None.    Previously Tried Antiseizure Medications: Vimpat 100 mg BID (ran out of it sometime in  and never restarted it). Oxcarbazepine. Topamax. Carbamazepine.  Lamotrigine.    Review of Systems: No recent fevers. No recent significant weight changes. The mood is overall stable. No SI/HI    Risk Factors For Epilepsy/Seizure Disorder: The patient is a product of normal pregnancy and delivery complicated by a planned  and a cord wrapped around her neck (there was some cyanosis). The early development was normal and the patient started waking, talking, and engaging in social interaction as expected. There were no challenges during school and no reported attendance of special education programs. There is no history of febrile seizures. There is no history of head trauma. There is no history of stroke. There is no history of CNS infections, such as encephalitis and/or meningitis. There is no history of neurosurgical interventions, except L5/S1 discectomy in .     Past Medical History:  Past  Medical History:   Diagnosis Date    Anesthesia 02/10/2021    mother hx apnea    Anxiety     Bronchitis 01/27/2025    history of    Depression 01/27/2025    history of    Migraine     Other chronic pain 01/27/2025    right SI joint pain    Seizure (HCC) 2014    complex partial, controlled, may have had one within 6 months    Seizure disorder, complex partial (HCC) 02/10/2021    hx of (last 1/2021)    TMJ arthropathy      Past Surgical History:  Past Surgical History:   Procedure Laterality Date    HERNIA REPAIR  01/27/2025    right sided abdominal hernia    CT NEUROPLASTY & OR TRANSPOS MEDIAN NRV CARPAL MATIAS Bilateral 03/15/2022    Procedure: LEFT OPEN CARPAL TUNNEL RELEASE, RIGHT OPEN CARPAL TUNNEL RELEASE;  Surgeon: Marlene Odom M.D.;  Location: [facility];  Service: Orthopedics    PB LAMINOTOMY,LUMBAR DISK,1 INTRSP Left 02/24/2021    Procedure: LAMINECTOMY, SPINE, LUMBAR, WITH [id number];  Surgeon: Indra Goode M.D.;  Location: [facility];  Service: Neurosurgery    LUMBAR TRANSFORAMINAL EPIDURAL STEROID INJECTION Bilateral 02/01/2021    Procedure: INJECTION, STEROID, SPINE, LUMBAR, EPIDURAL, TRANSFORAMINAL APPROACH;  Surgeon: Aldair Segovia M.D.;  [facility];  Service: Pain Management    BLEPHAROPLASTY Bilateral 2019    LEEP  2003    LIPOMA EXCISION       Social History:  Social History     Tobacco Use    Smoking status: Never    Smokeless tobacco: Never   Vaping Use    Vaping status: Never Used   Substance Use Topics    Alcohol use: Yes     Alcohol/week: 1.2 - 1.8 oz     Types: 2 - 3 Glasses of wine per week     Comment: weekly    Drug use: Not Currently     Types: Oral     Comment: marijuana edibles in past     Family History:  There is no known family history of seizures/epilepsy.  Family History   Problem Relation Age of Onset    Hypertension Mother     Cancer Father         prostate cancer    Depression Father     Genetic Disorder Father  "        Unknown Neuromusk disease    Hypertension Maternal Grandmother     Hyperlipidemia Maternal Grandmother     Genetic Disorder Brother         Unknown Neuromusk disease    Genetic Disorder Paternal Aunt         Unknown Neuromusk disease    Genetic Disorder Paternal Grandmother         Unknown Neuromusk disease         2/18/2025     8:00 AM 2/11/2025     4:40 PM 10/15/2024     2:00 PM 3/7/2023     7:40 AM 10/20/2022    12:00 PM   PHQ-9 Screening   Little interest or pleasure in doing things 0 - not at all 0 - not at all 0 - not at all 0 - not at all 0 - not at all   Feeling down, depressed, or hopeless 0 - not at all 0 - not at all 0 - not at all 0 - not at all 0 - not at all   PHQ-2 Total Score 0 0 0 0 0     [place]ems Suicide Severity Rating Scale     Wish to be Dead?: No  Suicidal Thoughts: No    Suicidal Thoughts with Method Without Specific Plan or Intent to Act:    Suicidal Intent Without Specific Plan:    Suicide Intent with Specific Plan:    Suicide Behavior Question: Yes  How long ago did you do any of these?: Over a year ago  C-SSRS Risk Level: Low Risk    Additional Suicide Screening Questions    Suspected or Confirmed Suicide Attempted?:    Harming or killing others?: No    Allergies:  No Known Allergies    Current Medications:    Current Outpatient Medications:     lacosamide, 100 mg, Oral, BID, Taking    Nurtec, 75 mg, Oral, PRN, PRN    multivitamin, 1 Tablet, Oral, DAILY, Taking    cholecalciferol, 10,000 Units, Oral, QHS, Taking    Magnesium, Take  by mouth at bedtime., Taking    acetaminophen, 1,000 mg, Oral, Q6HRS PRN, PRN    ibuprofen, 800 mg, Oral, Q8HRS PRN, PRN    Current Facility-Administered Medications:     onabotulinum toxin A (Botox) injection 155 Units    Physical Examination:    Ambulatory Vitals  Encounter Vitals  Temperature: 36.2 °C (97.2 °F)  Temp src: Temporal  Blood Pressure: 104/58  Pulse: 71  Pulse Oximetry: 99 %  Weight: 82.6 kg (182 lb 1.6 oz)  Height: 175.3 cm (5' 9\")  BMI " (Calculated): 26.89    Neurological Examination:  Mental Status: The patient is alert and oriented to person, place, time, and situation. Speech is fluent, with no aphasia nor dysarthria noted. Affect is normal.    Cranial Nerve Examination:  CN I: Olfaction examination is deferred.  CN II: Visual fields are full to confrontation examination and show no visual field defect.   CN III, IV, VI: Eye movements are normal in all directions. Pupils are reactive to direct and consensual light. There is no relative afferent pupillary defect. There is no nystagmus.  CN V: Facial sensation to light touch is intact throughout.   CN VII: No significant facial muscle or other soft tissue asymmetry.  CN VIII: Hearing intact to rubbing sounds bilaterally.   CN IX, X: Soft palate elevates symmetrically.  CN XI: Symmetrical shoulder shrug exam.  CN XII: Midline tongue protrusion and moves symmetrically to each side.     Motor Examination:  Muscle strength is intact throughout. Muscle tone is normal throughout. No abnormal movements are observed. No pronator drift is noted.     Muscle Stretch Reflexes Examination:  Deferred.     Sensory Examination:  Preserved sensation to light touch in all extremities.     Coordination:  Normal finger to nose testing bilaterally, no postural nor intentional tremor was noted.     Stance/gait:  Normal regular gait with normal arm swings and stride length. Able to perform tandem gait. Romberg sign is absent.     ASSESSMENT AND PLAN:  1. Seizure disorder (HCC)  Clinical presentation is consistent with a focal epilepsy, with focal aware seizures, likely localizing in the lateral temporal lobe, lateralization unclear at this time.  Etiology possibly related to the history of complicated birth.    The patient's seizures are occurring very rarely, with an event in October 2024, which occurred after years of being event free.  No events suspicious for seizures since October 2024.  In this context, the  patient is contemplating if she should start an antiseizure medication or not.  I strongly encouraged the patient to initiate Vimpat, with a goal dose of 100 mg twice daily.  In context of no recent workup, as well as questions about possible progression of her seizure disorder, we will obtain detailed workup:  - MR-BRAIN-WITH & W/O; Future  - 24-hour ambulatory EEG: Referral to Neurodiagnostics (EEG,EP,EMG/NCS/DBS).  The patient was advised in detail that she should start Vimpat as soon as possible, and that she should not wait for EEG to be completed prior to starting Vimpat.  I also shared with the patient the fact that EEG is typically higher yield of antiseizure medications.  The patient will consider if she is going to complete 24-ambulatory EEG at this time.    Refills for Vimpat provided at the time of the prior visit.   - Vimpat 100 mg BID.     The patient was provided clonazepam as needed for breakthrough seizures.  Adverse effects were discussed.  - clonazePAM (KLONOPIN) 0.5 MG Tab; Take 0.5 Tablets by mouth as needed (For seizures. No more than once per day.) for up to 30 days.  Dispense: 2 Tablet; Refill: 0    The patient may continue to drive, since she had no seizures for the last 3 months, as well as her seizures do not impair her awareness nor motor abilities.  The patient was advised to stop driving and having episodes of loss/alteration of awareness and the patient verbalized understanding and agreement.    We discussed her plans for pregnancy, and I advised the patient that lamotrigine might be more ideal medication for her in context of pregnancy, but since she tried it already in the past, as well as the fact that she had a healthy baby on Vimpat, we agreed to continue with Vimpat.  Discussed folic acid supplementation and close follow-up with OB/GYN.    Epilepsy counseling provided verbally and in writing.    2. Chronic migraine with aura without status migrainosus, not intractable  The  patient has a history of migraine, which is overall well-controlled with Botox, and we will continue Botox with no changes at this time.   - the patient was also advised to take magnesium and riboflavin supplementation.    The patient has chronic migraines, with 15 days of headaches per month, with more than 4 hours of headaches per day.  This has been going on for at least 3 months. This causes the patient significant impairment in their daily activities.     The plan is that I will inject Botox as following:      10 units divided in 2 sites   Procerus  5 units in 1 site   Frontalis  20 units divided in 4 sites   Temporalis  40 units divided into 8 sites   Occipitalis  30 units divided into 6 sites   Cervical paraspinals  20 units divided into 4 sites   Trapezius 30 units divided into 6 sites     This is to be repeated every 12 weeks.    Since starting Botox, the patient's headaches have significantly improved, with 2-5 headache days per month.    Provided refills for acute headache treatment, for which patient uses Nurtec.  - Rimegepant Sulfate (NURTEC) 75 MG TABLET DISPERSIBLE; Take 1 Tablet by mouth as needed (migraine).  Dispense: 9 Tablet; Refill: 5    We discussed that both Nurtec and Botox should be stopped if she becomes pregnant.    Patient Instructions   Please keep the diary of your headaches.    Please keep the diary of provoking factors for your headaches (e.g. alcohol, certain foods, etc).     Please take riboflavin (vitamin B2) 400 mg daily and magnesium oxide 400 mg daily for headache prevention.    Please continue Botox with no changes.    She takes Nurtec as needed. Please stop the above medications when you become pregnant.     Please let our office know if you experience any changes in your neurological status and/or any changes in the nature of your headaches.       Please take Vimpat 50 mg twice daily for week and then increase to 100 mg twice daily and continue this dose.    Please  use clonazepam 0.25 mg as needed for breakthrough seizures.    Please let our office know if you have any changes in your seizure frequency and/characteristics. Otherwise, please keep the diary of your events and bring it with you at the time of your next follow up visit with our office.     Please take vitamin D3 9945-8328 internation units daily.     Please take folic acid 1 mg daily. This is an over-the-counter supplement that is recommended to prevent certain developmental problems in your baby, in case you become pregnant in the future.    Please let our office know as soon as you become pregnant or plan to become pregnant.    If you are caring for a baby/young child, please make sure to be sitting on a soft surface while holding your baby/young child, so in case you have a seizure, your baby/young child is not injured due to fall.     Please let us know if you observe that your baby is excessively sleepy/has other changes and the pediatrician feels that there are no other explanations except possible adverse effects of antiseizure medication(s) your are taking while nursing your baby.     Please note that the following might precipitate seizures: missed doses of antiseizure medications, being sick with fever, stress, fatigue, sleep deprivation, not eating regularly, not drinking enough water, drinking too much alcohol, stopping alcohol suddenly if you are currently using it on a regular/daily basis, and/or using recreational drugs, among others.    Please note that the following might lead to an injury, potentially a life-threatening injury, in case you have a seizure and/or lose awareness while:   - being in a large body of water by yourself, such as bath, pool, lake, ocean, among others (risk of drowning)   - being on unprotected heights (risk of fall)   - being around and/or operating heavy machinery (risk of injury)   - being around open fire/hot surfaces (risk of burns)   - any other  activities/circumstances, in which if you lose awareness, you might injure yourself and/or others.    Please call for help (crisis line and/or 911) in case you have thoughts of harming yourself and/or others.    Please stop driving if you experience any changes/loss of awareness and/or seizures.     ------------------------------------------------------------------------------------------  Instructions for your family/caregivers:  Please call 911 if the patient has a seizure longer than 2-3 minutes, if seizures are back to back without her recovering to her baseline, or she does not start recovering within 5-10 minutes after the seizure stops. During the seizure - please turn her on her side, please make sure her head is protected (for example, you should put a pillow under her head, if one is available), and please do not put anything in her mouth.   -------------------------------------------------------------------------------------------    It is important that your seizures are well controlled and you have none or have them rarely. In addition to avoiding injury related to breakthrough seizures, frequent seizures increase risk of SUDEP (sudden unexpected death in epilepsy), where a person goes into a seizure and then never wakes up - this is a rare complication of seizure disorder; one of the best available ways to prevent it is to control your seizures well.     Due to the high volume of patients we are trying to help, your physician will not be able to respond by phone or in MyChart to your routine concerns between appointments.  This does not reflect a lack of interest or concern for you or your diagnosis.  Please bring these questions and concerns to your appointment where your physician can answer.  Please relay more pressing concerns to our office, either via MyChart, or by phone; if not able to reach us please visit nearby Urgent Care Center or Emergency Department.  If any emergent medical needs, please  seek emergent medical help and/or call 911.    Please note that we are not able to fill out paperwork that might be related to your work, utility company, disability, and/or driving, among others, in between the visits.  Please schedule a dedicated appointment to address your paperwork, so we can do that in a timely manner.  This is not due to lack of concern or interest for your disease-related work/administrative problems, but to make sure that we provide the best possible care and to fill out your paperwork in a correct and timely manner.    Thank you for entrusting your neurological care to [facility] and we look forward to continuing to serve you.     Follow up in 3 months.     My total time spent caring for the patient on the day of the encounter was 62 minutes.   This does not include time spent on separately billable procedures/tests.      Issa Walls MD  Outpatient Neurology   [facility]

## 2025-02-18 NOTE — PATIENT INSTRUCTIONS
Please keep the diary of your headaches.    Please keep the diary of provoking factors for your headaches (e.g. alcohol, certain foods, etc).     Please take riboflavin (vitamin B2) 400 mg daily and magnesium oxide 400 mg daily for headache prevention.    Please continue Botox with no changes.    She takes Nurtec as needed. Please stop the above medications when you become pregnant.     Please let our office know if you experience any changes in your neurological status and/or any changes in the nature of your headaches.       Please take Vimpat 50 mg twice daily for week and then increase to 100 mg twice daily and continue this dose.    Please use clonazepam 0.25 mg as needed for breakthrough seizures.    Please let our office know if you have any changes in your seizure frequency and/characteristics. Otherwise, please keep the diary of your events and bring it with you at the time of your next follow up visit with our office.     Please take vitamin D3 [id number] internation units daily.     Please take folic acid 1 mg daily. This is an over-the-counter supplement that is recommended to prevent certain developmental problems in your baby, in case you become pregnant in the future.    Please let our office know as soon as you become pregnant or plan to become pregnant.    If you are caring for a baby/young child, please make sure to be sitting on a soft surface while holding your baby/young child, so in case you have a seizure, your baby/young child is not injured due to fall.     Please let us know if you observe that your baby is excessively sleepy/has other changes and the pediatrician feels that there are no other explanations except possible adverse effects of antiseizure medication(s) your are taking while nursing your baby.     Please note that the following might precipitate seizures: missed doses of antiseizure medications, being sick with fever, stress, fatigue, sleep deprivation, not eating regularly, not  drinking enough water, drinking too much alcohol, stopping alcohol suddenly if you are currently using it on a regular/daily basis, and/or using recreational drugs, among others.    Please note that the following might lead to an injury, potentially a life-threatening injury, in case you have a seizure and/or lose awareness while:   - being in a large body of water by yourself, such as bath, pool, lake, ocean, among others (risk of drowning)   - being on unprotected heights (risk of fall)   - being around and/or operating heavy machinery (risk of injury)   - being around open fire/hot surfaces (risk of burns)   - any other activities/circumstances, in which if you lose awareness, you might injure yourself and/or others.    Please call for help (crisis line and/or 911) in case you have thoughts of harming yourself and/or others.    Please stop driving if you experience any changes/loss of awareness and/or seizures.     ------------------------------------------------------------------------------------------  Instructions for your family/caregivers:  Please call 911 if the patient has a seizure longer than 2-3 minutes, if seizures are back to back without her recovering to her baseline, or she does not start recovering within 5-10 minutes after the seizure stops. During the seizure - please turn her on her side, please make sure her head is protected (for example, you should put a pillow under her head, if one is available), and please do not put anything in her mouth.   -------------------------------------------------------------------------------------------    It is important that your seizures are well controlled and you have none or have them rarely. In addition to avoiding injury related to breakthrough seizures, frequent seizures increase risk of SUDEP (sudden unexpected death in epilepsy), where a person goes into a seizure and then never wakes up - this is a rare complication of seizure disorder; one of the  best available ways to prevent it is to control your seizures well.     Due to the high volume of patients we are trying to help, your physician will not be able to respond by phone or in MyChart to your routine concerns between appointments.  This does not reflect a lack of interest or concern for you or your diagnosis.  Please bring these questions and concerns to your appointment where your physician can answer.  Please relay more pressing concerns to our office, either via MyChart, or by phone; if not able to reach us please visit nearby Urgent Care Center or Emergency Department.  If any emergent medical needs, please seek emergent medical help and/or call 911.    Please note that we are not able to fill out paperwork that might be related to your work, utility company, disability, and/or driving, among others, in between the visits.  Please schedule a dedicated appointment to address your paperwork, so we can do that in a timely manner.  This is not due to lack of concern or interest for your disease-related work/administrative problems, but to make sure that we provide the best possible care and to fill out your paperwork in a correct and timely manner.    Thank you for entrusting your neurological care to [facility] and we look forward to continuing to serve you.

## 2025-02-19 ENCOUNTER — TELEPHONE (OUTPATIENT)
Dept: NEUROLOGY | Facility: MEDICAL CENTER | Age: 40
End: 2025-02-19
Payer: COMMERCIAL

## 2025-03-11 ENCOUNTER — APPOINTMENT (OUTPATIENT)
Dept: NEUROLOGY | Facility: MEDICAL CENTER | Age: 40
End: 2025-03-11
Attending: PSYCHIATRY & NEUROLOGY
Payer: COMMERCIAL

## 2025-04-20 ENCOUNTER — HOSPITAL ENCOUNTER (OUTPATIENT)
Dept: RADIOLOGY | Facility: MEDICAL CENTER | Age: 40
End: 2025-04-20
Attending: PSYCHIATRY & NEUROLOGY
Payer: COMMERCIAL

## 2025-04-20 DIAGNOSIS — G40.909 SEIZURE DISORDER (HCC): ICD-10-CM

## 2025-04-20 PROCEDURE — A9579 GAD-BASE MR CONTRAST NOS,1ML: HCPCS | Mod: JZ | Performed by: PSYCHIATRY & NEUROLOGY

## 2025-04-20 PROCEDURE — 700117 HCHG RX CONTRAST REV CODE 255: Mod: JZ | Performed by: PSYCHIATRY & NEUROLOGY

## 2025-04-20 PROCEDURE — 70553 MRI BRAIN STEM W/O & W/DYE: CPT

## 2025-04-20 RX ADMIN — GADOTERIDOL 18 ML: 279.3 INJECTION, SOLUTION INTRAVENOUS at 10:03

## 2025-05-06 ENCOUNTER — OFFICE VISIT (OUTPATIENT)
Dept: NEUROLOGY | Facility: MEDICAL CENTER | Age: 40
End: 2025-05-06
Attending: PSYCHIATRY & NEUROLOGY
Payer: COMMERCIAL

## 2025-05-06 VITALS
WEIGHT: 183 LBS | SYSTOLIC BLOOD PRESSURE: 124 MMHG | HEIGHT: 69 IN | BODY MASS INDEX: 27.11 KG/M2 | TEMPERATURE: 97.9 F | OXYGEN SATURATION: 97 % | HEART RATE: 93 BPM | RESPIRATION RATE: 16 BRPM | DIASTOLIC BLOOD PRESSURE: 78 MMHG

## 2025-05-06 DIAGNOSIS — G43.E09 CHRONIC MIGRAINE WITH AURA WITHOUT STATUS MIGRAINOSUS, NOT INTRACTABLE: ICD-10-CM

## 2025-05-06 PROCEDURE — 700101 HCHG RX REV CODE 250

## 2025-05-06 PROCEDURE — 700111 HCHG RX REV CODE 636 W/ 250 OVERRIDE (IP): Mod: JZ

## 2025-05-06 PROCEDURE — 3074F SYST BP LT 130 MM HG: CPT | Performed by: PSYCHIATRY & NEUROLOGY

## 2025-05-06 PROCEDURE — 3078F DIAST BP <80 MM HG: CPT | Performed by: PSYCHIATRY & NEUROLOGY

## 2025-05-06 PROCEDURE — 64615 CHEMODENERV MUSC MIGRAINE: CPT | Performed by: PSYCHIATRY & NEUROLOGY

## 2025-05-06 RX ADMIN — SODIUM CHLORIDE 155 UNITS: 9 INJECTION, SOLUTION INTRAMUSCULAR; INTRAVENOUS; SUBCUTANEOUS at 10:08

## 2025-05-06 ASSESSMENT — FIBROSIS 4 INDEX: FIB4 SCORE: 0.51

## 2025-05-06 ASSESSMENT — PATIENT HEALTH QUESTIONNAIRE - PHQ9: CLINICAL INTERPRETATION OF PHQ2 SCORE: 0

## 2025-05-06 NOTE — PROCEDURES
[facility]    PROCEDURE NOTE    Procedure: Botulinum Injection  Primary Dx:   1. Chronic migraine with aura without status migrainosus, not intractable          SUBJECTIVE:  Botox has been successful in reducing the intensity and frequency of migraines.  Denies recent illnesses or use of antibiotics.  Denies any significant negative side effects from the injections.     OBJECTIVE:  Vitals:    05/06/25 0934   BP: 124/78   Pulse: 93   Resp: 16   Temp: 36.6 °C (97.9 °F)   SpO2: 97%     ASSESSMENT&PLAN:  Botox therapy has reduced patient’s migraines by more than 7 days and/or 100 hours per month.  Although this patient is responding to botox, the patient is  NOT migraine free, however, and it is my recommendation Botox be continued at this time.  Frequency of headaches is >15 days monthly with at least 8 migraines monthly; Migraines include at least two of the following: worsened with activity or avoidance of activity with migraines (ie they go lie down), moderate to severe pain intensity, pulsing headache, unilateral headache and has  have either nausea or vomiting OR sensitivity to light and sound.    INFORMED CONSENT   The risks and benefits of the procedure were explained to the patient, and all questions were answered. Benefits of the injection include spasticity reduction by decrease in muscle activation following toxin injection. Adverse effects from toxin injection include but are not limited to: excessive weakness, infection, breathing and/or swallowing difficulty.  Common adverse effects from the injection itself are pain and bruising. The patient demonstrated good understanding of risks and benefits and consents to botulinum toxin injection.     Pre-procedure time out was performed.     PROCEDURE:  After clean preparation of skin using an alcohol swab, a needle was inserted into the muscle(s) listed below.         10 units divided in 2 sites   Procerus  5  units in 1 site   Frontalis  20 units divided in 4 sites   Temporalis  40 units divided into 8 sites   Occipitalis  30 units divided into 6 sites   Cervical paraspinals  20 units divided into 4 sites   Trapezius 30 units divided into 6 sites     I treated this patient in clinic today with BotoxA 155 units according to the dosing/injection paradigm currently mandated by the FDA for the management of chronic migraine. Specifically, I injected 5 units to the procerus, 5 units to the corrugators bilaterally, a total of 20 units to the frontalis musculature, 20 units to the temporalis bilaterally, 15 units to the occipitalis bilaterally, 10 units to the cervical paraspinals bilaterally and 15 units to the trapezius musculature bilaterally. The remainder of the Botox 200 units mixed but not administered was discarded as wastage per FDA guidelines  Consent on file.  Patient identify verified with 2 patient identifiers.    Injection sites were cleaned with alcohol and band aid was applied afterwards.  The patient tolerated the procedure well.  There were no complications.     MEDICATION USED:  200 units of botulinum toxin type A, mixed with 4 cc of sterile, preservative-free normal saline in a 4 separate 1 cc syringes.    Total units injected: 155 units  Total units discarded: 45 units    This patient has chronic migraines, defined as having 15 or more headaches days per month, 8 of which are migraines, over a minimum of the last three months. Episodes last more than 4 hours (untreated).  Pt has 2 or more of following (see initial note) -  Headache worsened with activity, pain is moderate to severe intensity, pulsing in nature, unilateral and patient either has nausea/vomiting OR sensitivity to light and sound. This patient does not also have medication overuse headache.    No adverse effect of Botox noted at conclusion of today's appointment. Pt was instructed to seek immediate medical attention if fever, difficulty  breathing, difficulty swallowing, or other concerning sxs arise. Counseled patient not to rub face, exercise, or use heat for at least 24 hours to prevent spreading of medication. Counseled that while every attempt possible is made to avoid minor and major blood vessels, mild bruising can occur due to injection and should resolve within 5-7 days.    RTC in 3 months for repeat injection, or sooner if concerns arise.     Issa Walls MD  Neurology Attending, Headache Program  [facility]

## 2025-06-27 ENCOUNTER — HOSPITAL ENCOUNTER (OUTPATIENT)
Dept: LAB | Facility: MEDICAL CENTER | Age: 40
End: 2025-06-27
Attending: PSYCHIATRY & NEUROLOGY
Payer: COMMERCIAL

## 2025-06-27 LAB
25(OH)D3 SERPL-MCNC: 34 NG/ML (ref 30–100)
ALBUMIN SERPL BCP-MCNC: 4.4 G/DL (ref 3.2–4.9)
ALBUMIN/GLOB SERPL: 2 G/DL
ALP SERPL-CCNC: 44 U/L (ref 30–99)
ALT SERPL-CCNC: 23 U/L (ref 2–50)
ANION GAP SERPL CALC-SCNC: 9 MMOL/L (ref 7–16)
AST SERPL-CCNC: 20 U/L (ref 12–45)
B-HCG SERPL-ACNC: 11.6 MIU/ML (ref 0–5)
BASOPHILS # BLD AUTO: 1 % (ref 0–1.8)
BASOPHILS # BLD: 0.06 K/UL (ref 0–0.12)
BILIRUB SERPL-MCNC: 0.6 MG/DL (ref 0.1–1.5)
BUN SERPL-MCNC: 21 MG/DL (ref 8–22)
CALCIUM ALBUM COR SERPL-MCNC: 8.8 MG/DL (ref 8.5–10.5)
CALCIUM SERPL-MCNC: 9.1 MG/DL (ref 8.5–10.5)
CHLORIDE SERPL-SCNC: 106 MMOL/L (ref 96–112)
CHOLEST SERPL-MCNC: 157 MG/DL (ref 100–199)
CO2 SERPL-SCNC: 24 MMOL/L (ref 20–33)
CREAT SERPL-MCNC: 0.9 MG/DL (ref 0.5–1.4)
EOSINOPHIL # BLD AUTO: 0.1 K/UL (ref 0–0.51)
EOSINOPHIL NFR BLD: 1.7 % (ref 0–6.9)
ERYTHROCYTE [DISTWIDTH] IN BLOOD BY AUTOMATED COUNT: 42.1 FL (ref 35.9–50)
FOLATE SERPL-MCNC: 15.4 NG/ML
GFR SERPLBLD CREATININE-BSD FMLA CKD-EPI: 82 ML/MIN/1.73 M 2
GLOBULIN SER CALC-MCNC: 2.2 G/DL (ref 1.9–3.5)
GLUCOSE SERPL-MCNC: 98 MG/DL (ref 65–99)
HCT VFR BLD AUTO: 40.8 % (ref 37–47)
HDLC SERPL-MCNC: 52 MG/DL
HGB BLD-MCNC: 13.6 G/DL (ref 12–16)
IMM GRANULOCYTES # BLD AUTO: 0.01 K/UL (ref 0–0.11)
IMM GRANULOCYTES NFR BLD AUTO: 0.2 % (ref 0–0.9)
LDLC SERPL CALC-MCNC: 95 MG/DL
LYMPHOCYTES # BLD AUTO: 1.5 K/UL (ref 1–4.8)
LYMPHOCYTES NFR BLD: 26.1 % (ref 22–41)
MCH RBC QN AUTO: 29.7 PG (ref 27–33)
MCHC RBC AUTO-ENTMCNC: 33.3 G/DL (ref 32.2–35.5)
MCV RBC AUTO: 89.1 FL (ref 81.4–97.8)
MONOCYTES # BLD AUTO: 0.4 K/UL (ref 0–0.85)
MONOCYTES NFR BLD AUTO: 7 % (ref 0–13.4)
NEUTROPHILS # BLD AUTO: 3.67 K/UL (ref 1.82–7.42)
NEUTROPHILS NFR BLD: 64 % (ref 44–72)
NRBC # BLD AUTO: 0 K/UL
NRBC BLD-RTO: 0 /100 WBC (ref 0–0.2)
PLATELET # BLD AUTO: 290 K/UL (ref 164–446)
PMV BLD AUTO: 9.6 FL (ref 9–12.9)
POTASSIUM SERPL-SCNC: 3.9 MMOL/L (ref 3.6–5.5)
PROT SERPL-MCNC: 6.6 G/DL (ref 6–8.2)
RBC # BLD AUTO: 4.58 M/UL (ref 4.2–5.4)
SODIUM SERPL-SCNC: 139 MMOL/L (ref 135–145)
T4 FREE SERPL-MCNC: 1.37 NG/DL (ref 0.93–1.7)
TRIGL SERPL-MCNC: 48 MG/DL (ref 0–149)
TSH SERPL-ACNC: 1.31 UIU/ML (ref 0.38–5.33)
VIT B12 SERPL-MCNC: 647 PG/ML (ref 211–911)
WBC # BLD AUTO: 5.7 K/UL (ref 4.8–10.8)

## 2025-06-27 PROCEDURE — 82306 VITAMIN D 25 HYDROXY: CPT

## 2025-06-27 PROCEDURE — 82746 ASSAY OF FOLIC ACID SERUM: CPT

## 2025-06-27 PROCEDURE — 82607 VITAMIN B-12: CPT

## 2025-06-27 PROCEDURE — 84702 CHORIONIC GONADOTROPIN TEST: CPT

## 2025-06-27 PROCEDURE — 36415 COLL VENOUS BLD VENIPUNCTURE: CPT

## 2025-06-27 PROCEDURE — 80053 COMPREHEN METABOLIC PANEL: CPT

## 2025-06-27 PROCEDURE — 84443 ASSAY THYROID STIM HORMONE: CPT

## 2025-06-27 PROCEDURE — 80061 LIPID PANEL: CPT

## 2025-06-27 PROCEDURE — 84439 ASSAY OF FREE THYROXINE: CPT

## 2025-06-27 PROCEDURE — 85025 COMPLETE CBC W/AUTO DIFF WBC: CPT

## 2025-06-30 ENCOUNTER — HOSPITAL ENCOUNTER (OUTPATIENT)
Dept: LAB | Facility: MEDICAL CENTER | Age: 40
End: 2025-06-30
Attending: PSYCHIATRY & NEUROLOGY
Payer: COMMERCIAL

## 2025-06-30 LAB — B-HCG SERPL-ACNC: 1.5 MIU/ML (ref 0–5)

## 2025-06-30 PROCEDURE — 36415 COLL VENOUS BLD VENIPUNCTURE: CPT

## 2025-06-30 PROCEDURE — 84702 CHORIONIC GONADOTROPIN TEST: CPT

## 2025-07-24 ENCOUNTER — OFFICE VISIT (OUTPATIENT)
Dept: URGENT CARE | Facility: CLINIC | Age: 40
End: 2025-07-24
Payer: COMMERCIAL

## 2025-07-24 VITALS
OXYGEN SATURATION: 100 % | HEART RATE: 100 BPM | HEIGHT: 69 IN | WEIGHT: 180 LBS | RESPIRATION RATE: 16 BRPM | BODY MASS INDEX: 26.66 KG/M2 | TEMPERATURE: 98.5 F | DIASTOLIC BLOOD PRESSURE: 76 MMHG | SYSTOLIC BLOOD PRESSURE: 118 MMHG

## 2025-07-24 DIAGNOSIS — J06.9 VIRAL URI: ICD-10-CM

## 2025-07-24 DIAGNOSIS — R05.1 ACUTE COUGH: Primary | ICD-10-CM

## 2025-07-24 PROCEDURE — 3078F DIAST BP <80 MM HG: CPT

## 2025-07-24 PROCEDURE — 3074F SYST BP LT 130 MM HG: CPT

## 2025-07-24 PROCEDURE — 99213 OFFICE O/P EST LOW 20 MIN: CPT

## 2025-07-24 RX ORDER — BECLOMETHASONE DIPROPIONATE HFA 80 UG/1
1 AEROSOL, METERED RESPIRATORY (INHALATION) 2 TIMES DAILY
Qty: 1 EACH | Refills: 0 | Status: SHIPPED | OUTPATIENT
Start: 2025-07-24

## 2025-07-24 RX ORDER — BENZONATATE 100 MG/1
100 CAPSULE ORAL 3 TIMES DAILY PRN
Qty: 60 CAPSULE | Refills: 0 | Status: SHIPPED | OUTPATIENT
Start: 2025-07-24

## 2025-07-24 RX ORDER — DEXTROMETHORPHAN HYDROBROMIDE AND PROMETHAZINE HYDROCHLORIDE 15; 6.25 MG/5ML; MG/5ML
5 SYRUP ORAL
Qty: 118 ML | Refills: 0 | Status: SHIPPED | OUTPATIENT
Start: 2025-07-24

## 2025-07-24 RX ORDER — ALBUTEROL SULFATE 90 UG/1
2 INHALANT RESPIRATORY (INHALATION) EVERY 4 HOURS PRN
Qty: 8 G | Refills: 0 | Status: SHIPPED | OUTPATIENT
Start: 2025-07-24

## 2025-07-24 ASSESSMENT — ENCOUNTER SYMPTOMS
SHORTNESS OF BREATH: 0
CHILLS: 1
HOARSE VOICE: 1
FEVER: 0
HEADACHES: 1
DIZZINESS: 0
DIARRHEA: 0
SORE THROAT: 1
VOMITING: 0
NAUSEA: 0
WEAKNESS: 0
COUGH: 0

## 2025-07-24 ASSESSMENT — FIBROSIS 4 INDEX: FIB4 SCORE: 0.58

## 2025-07-24 NOTE — PROGRESS NOTES
"Subjective     Roxanna Guzmán is a 39 y.o. female who presents with Sinus Problem (X1 week, cough, possible bronchitis and ears pressure/pain )            Sinus Problem  This is a new problem. The current episode started in the past 7 days (5 days ago). The problem has been gradually worsening since onset. There has been no fever. Associated symptoms include chills, congestion, ear pain, headaches, a hoarse voice and a sore throat. Pertinent negatives include no coughing or shortness of breath. Past treatments include acetaminophen. The treatment provided mild relief.       Review of Systems   Constitutional:  Positive for chills. Negative for fever and malaise/fatigue.   HENT:  Positive for congestion, ear pain, hoarse voice and sore throat.    Respiratory:  Negative for cough and shortness of breath.    Cardiovascular:  Negative for chest pain.   Gastrointestinal:  Negative for diarrhea, nausea and vomiting.   Skin:  Negative for rash.   Neurological:  Positive for headaches. Negative for dizziness and weakness.   All other systems reviewed and are negative.             Objective     /76   Pulse 100   Temp 36.9 °C (98.5 °F) (Temporal)   Resp 16   Ht 1.753 m (5' 9\")   Wt 81.6 kg (180 lb)   SpO2 100%   BMI 26.58 kg/m²      Physical Exam  Vitals reviewed.   Constitutional:       Appearance: Normal appearance. She is ill-appearing.   HENT:      Head: Normocephalic.      Right Ear: Tympanic membrane and ear canal normal.      Nose: Congestion present.      Mouth/Throat:      Mouth: Mucous membranes are moist.      Pharynx: Oropharynx is clear. Postnasal drip present.   Eyes:      Extraocular Movements: Extraocular movements intact.      Conjunctiva/sclera: Conjunctivae normal.   Cardiovascular:      Rate and Rhythm: Normal rate and regular rhythm.   Pulmonary:      Effort: Pulmonary effort is normal.      Breath sounds: Normal breath sounds.   Abdominal:      General: Abdomen is flat.      " Palpations: Abdomen is soft.   Musculoskeletal:         General: Normal range of motion.   Skin:     General: Skin is warm and dry.   Neurological:      Mental Status: She is alert and oriented to person, place, and time.   Psychiatric:         Behavior: Behavior normal.                                  Assessment & Plan  Acute cough    Orders:    benzonatate (TESSALON) 100 MG Cap; Take 1 Capsule by mouth 3 times a day as needed for Cough.    promethazine-dextromethorphan (PROMETHAZINE-DM) 6.25-15 MG/5ML syrup; Take 5 mL by mouth at bedtime as needed for Cough.    albuterol 108 (90 Base) MCG/ACT Aero Soln inhalation aerosol; Inhale 2 Puffs every four hours as needed for Shortness of Breath (cough).    beclomethasone HFA (QVAR REDIHALER) 80 MCG/ACT inhaler; Inhale 1 Puff 2 times a day.    Viral URI    Orders:    benzonatate (TESSALON) 100 MG Cap; Take 1 Capsule by mouth 3 times a day as needed for Cough.    promethazine-dextromethorphan (PROMETHAZINE-DM) 6.25-15 MG/5ML syrup; Take 5 mL by mouth at bedtime as needed for Cough.    albuterol 108 (90 Base) MCG/ACT Aero Soln inhalation aerosol; Inhale 2 Puffs every four hours as needed for Shortness of Breath (cough).    Discussed viral testing however day 5 of symptoms educated do not change our plan of care therefore she declined and declined.    Roxanna can continue supportive care that was discussed in clinic such as alternating ibuprofen and acetaminophen, rest, plenty of fluids such as water, Pedialyte, low sugar Gatorade, broth, hot steamy showers, hot tea with honey, cough drops/throat spray, and a cool-mist humidifier by bed at night.    Shared decision-making was utilized with Roxanna for plan of care. Discussed differential diagnoses, natural history, and supportive care. Reviewed indication for use and the potential benefits and side effects of medications. Roxanna was encouraged to monitor symptoms closely and educated about signs and symptoms that would warrant concern  and mandate seeking a higher level of service through the emergency department discussed at length. All questions answered to Roxanna's satisfaction and they were in agreement with treatment plan at time of discharge.

## 2025-08-05 ENCOUNTER — APPOINTMENT (OUTPATIENT)
Dept: NEUROLOGY | Facility: MEDICAL CENTER | Age: 40
End: 2025-08-05
Attending: PSYCHIATRY & NEUROLOGY
Payer: COMMERCIAL

## 2025-08-06 ENCOUNTER — OFFICE VISIT (OUTPATIENT)
Dept: NEUROLOGY | Facility: MEDICAL CENTER | Age: 40
End: 2025-08-06
Attending: PSYCHIATRY & NEUROLOGY
Payer: COMMERCIAL

## 2025-08-06 VITALS
HEIGHT: 69 IN | TEMPERATURE: 98.1 F | OXYGEN SATURATION: 98 % | DIASTOLIC BLOOD PRESSURE: 74 MMHG | SYSTOLIC BLOOD PRESSURE: 124 MMHG | HEART RATE: 89 BPM | RESPIRATION RATE: 16 BRPM | WEIGHT: 183.42 LBS | BODY MASS INDEX: 27.17 KG/M2

## 2025-08-06 DIAGNOSIS — G43.E09 CHRONIC MIGRAINE WITH AURA WITHOUT STATUS MIGRAINOSUS, NOT INTRACTABLE: Primary | ICD-10-CM

## 2025-08-06 PROCEDURE — 3074F SYST BP LT 130 MM HG: CPT | Performed by: PSYCHIATRY & NEUROLOGY

## 2025-08-06 PROCEDURE — 64615 CHEMODENERV MUSC MIGRAINE: CPT | Performed by: PSYCHIATRY & NEUROLOGY

## 2025-08-06 PROCEDURE — 3078F DIAST BP <80 MM HG: CPT | Performed by: PSYCHIATRY & NEUROLOGY

## 2025-08-06 PROCEDURE — 700111 HCHG RX REV CODE 636 W/ 250 OVERRIDE (IP): Mod: JZ

## 2025-08-06 PROCEDURE — 700101 HCHG RX REV CODE 250

## 2025-08-06 RX ADMIN — SODIUM CHLORIDE 155 UNITS: 9 INJECTION, SOLUTION INTRAMUSCULAR; INTRAVENOUS; SUBCUTANEOUS at 08:37

## 2025-08-06 ASSESSMENT — PATIENT HEALTH QUESTIONNAIRE - PHQ9: CLINICAL INTERPRETATION OF PHQ2 SCORE: 0

## 2025-08-06 ASSESSMENT — FIBROSIS 4 INDEX: FIB4 SCORE: 0.58

## 2025-10-22 ENCOUNTER — APPOINTMENT (OUTPATIENT)
Dept: NEUROLOGY | Facility: MEDICAL CENTER | Age: 40
End: 2025-10-22
Attending: PSYCHIATRY & NEUROLOGY
Payer: COMMERCIAL

## (undated) DEVICE — MIDAS LUBRICATOR DIFFUSER PACK (4EA/CA)

## (undated) DEVICE — PACK JACKSON TABLE KIT W/OUT - HR (6EA/CA)

## (undated) DEVICE — SUTURE 2-0 VICRYL PLUS SH - 8 X 18 INCH (12/BX)

## (undated) DEVICE — HEADREST PRONEVIEW LARGE - (10/CA)

## (undated) DEVICE — LACTATED RINGERS INJ 1000 ML - (14EA/CA 60CA/PF)

## (undated) DEVICE — GLOVE BIOGEL INDICATOR SZ 8 SURGICAL PF LTX - (50/BX 4BX/CA)

## (undated) DEVICE — SET LEADWIRE 5 LEAD BEDSIDE DISPOSABLE ECG (1SET OF 5/EA)

## (undated) DEVICE — LACTATED RINGERS INJ. 500 ML - (24EA/CA)

## (undated) DEVICE — SENSOR SPO2 NEO LNCS ADHESIVE (20/BX) SEE USER NOTES

## (undated) DEVICE — TUBING CLEARLINK DUO-VENT - C-FLO (48EA/CA)

## (undated) DEVICE — SYRINGE NON SAFETY 10 CC 20 GA X 1-1/2 IN (100/BX 4BX/CA)

## (undated) DEVICE — SODIUM CHL IRRIGATION 0.9% 1000ML (12EA/CA)

## (undated) DEVICE — GOWN WARMING STANDARD FLEX - (30/CA)

## (undated) DEVICE — KIT SURGIFLO W/OUT THROMBIN - (6EA/CA)

## (undated) DEVICE — SYRINGE NON SAFETY 10 CC 20 GA X 1-1/2 IN (100/BX, 4BX/CA)

## (undated) DEVICE — TOOL DISSECTING BALL SYMMETRI MIDAS REX LEGEND 14CM 4MM

## (undated) DEVICE — NEEDLE NON SAFETY HYPO 22 GA X 1 1/2 IN (100/BX)

## (undated) DEVICE — ARMREST CRADLE FOAM - (2PR/PK 12PR/CA)

## (undated) DEVICE — ELECTRODE 850 FOAM ADHESIVE - HYDROGEL RADIOTRNSPRNT (50/PK)

## (undated) DEVICE — CANISTER SUCTION 3000ML MECHANICAL FILTER AUTO SHUTOFF MEDI-VAC NONSTERILE LF DISP  (40EA/CA)

## (undated) DEVICE — NEEDLE SPINAL NON-SAFETY 18 GA X 3 IN (25EA/BX)

## (undated) DEVICE — SLEEVE, VASO, THIGH, MED

## (undated) DEVICE — ELECTRODE DUAL RETURN W/ CORD - (50/PK)

## (undated) DEVICE — PACK NEURO - (2EA/CA)

## (undated) DEVICE — CHLORAPREP 26 ML APPLICATOR - ORANGE TINT(25/CA)

## (undated) DEVICE — SUTURE GENERAL

## (undated) DEVICE — SUTURE 1 VICRYL PLUS CT-1 - 18 INCH (12/BX)

## (undated) DEVICE — SUTURE 4-0 MONOCRYL PLUS PS-2 - 27 INCH (36/BX)

## (undated) DEVICE — MASK ANESTHESIA ADULT  - (100/CA)

## (undated) DEVICE — PROTECTOR ULNA NERVE - (36PR/CA)

## (undated) DEVICE — SUCTION INSTRUMENT YANKAUER BULBOUS TIP W/O VENT (50EA/CA)

## (undated) DEVICE — SET EXTENSION WITH 2 PORTS (48EA/CA) ***PART #2C8610 IS A SUBSTITUTE*****

## (undated) DEVICE — TRAY SKIN SCRUB PVP WET (20EA/CA) PART #DYND70356 DISCONTINUED

## (undated) DEVICE — BOVIE BLADE COATED &INSULATED - 25/PK

## (undated) DEVICE — DRAPE LAPAROTOMY T SHEET - (12EA/CA)

## (undated) DEVICE — SUTURE 3-0 VICRYL PLUS SH - 8X 18 INCH (12/BX)

## (undated) DEVICE — KIT ANESTHESIA W/CIRCUIT & 3/LT BAG W/FILTER (20EA/CA)

## (undated) DEVICE — SUTURE 0 VICRYL PLUS CT-1 - 8 X 18 INCH (12/BX)

## (undated) DEVICE — GLOVE PROTEXIS PI MICRO SZ 8.5 (200PR/CA)

## (undated) DEVICE — NEEDLE, DISP 16G X 1-1/2 BLUNT

## (undated) DEVICE — TUBING C&T SET FLYING LEADS DRAIN TUBING (10EA/BX)

## (undated) DEVICE — SYRINGE 20 ML LL (50EA/BX 4BX/CA)

## (undated) DEVICE — LIGHT SOURCE MIS 12FT

## (undated) DEVICE — GLOVE BIOGEL SZ 8 SURGICAL PF LTX - (50PR/BX 4BX/CA)

## (undated) DEVICE — COVER MAYO STAND X-LG - (22EA/CA)

## (undated) DEVICE — NEPTUNE 4 PORT MANIFOLD - (20/PK)

## (undated) DEVICE — FORCEPS IRRIGATING 8 X 1.5MM (5EA/BX)

## (undated) DEVICE — GLOVESZ 8.5 BIOGEL PI MICRO - PF LF (50PR/BX)

## (undated) DEVICE — SUTURE 2-0 ETHILON FS - (36/BX) 18 INCH

## (undated) DEVICE — DRAPE STRLE REG TOWEL 18X24 - (10/BX 4BX/CA)"

## (undated) DEVICE — DRAPE LARGE 3 QUARTER - (20/CA)

## (undated) DEVICE — SUTURE 4-0 MONOCRYL PLUS PS-1 - 27 INCH (36/BX)

## (undated) DEVICE — TOOL DISSECT MATCH HEAD